# Patient Record
Sex: FEMALE | Race: WHITE | NOT HISPANIC OR LATINO | Employment: FULL TIME | URBAN - METROPOLITAN AREA
[De-identification: names, ages, dates, MRNs, and addresses within clinical notes are randomized per-mention and may not be internally consistent; named-entity substitution may affect disease eponyms.]

---

## 2021-10-14 ENCOUNTER — TELEPHONE (OUTPATIENT)
Dept: NEUROSURGERY | Facility: CLINIC | Age: 41
End: 2021-10-14

## 2021-10-19 ENCOUNTER — TELEPHONE (OUTPATIENT)
Dept: NEUROSURGERY | Facility: CLINIC | Age: 41
End: 2021-10-19

## 2021-10-21 ENCOUNTER — TELEPHONE (OUTPATIENT)
Dept: NEUROSURGERY | Facility: CLINIC | Age: 41
End: 2021-10-21

## 2021-10-26 ENCOUNTER — LAB VISIT (OUTPATIENT)
Dept: LAB | Facility: HOSPITAL | Age: 41
End: 2021-10-26
Attending: NEUROLOGICAL SURGERY
Payer: COMMERCIAL

## 2021-10-26 ENCOUNTER — OFFICE VISIT (OUTPATIENT)
Dept: ENDOCRINOLOGY | Facility: CLINIC | Age: 41
End: 2021-10-26
Payer: COMMERCIAL

## 2021-10-26 ENCOUNTER — OFFICE VISIT (OUTPATIENT)
Dept: NEUROSURGERY | Facility: CLINIC | Age: 41
End: 2021-10-26
Payer: COMMERCIAL

## 2021-10-26 VITALS
SYSTOLIC BLOOD PRESSURE: 125 MMHG | HEART RATE: 77 BPM | HEIGHT: 63 IN | DIASTOLIC BLOOD PRESSURE: 85 MMHG | WEIGHT: 129.63 LBS | BODY MASS INDEX: 22.97 KG/M2 | OXYGEN SATURATION: 100 %

## 2021-10-26 DIAGNOSIS — R42 DIZZINESS: ICD-10-CM

## 2021-10-26 DIAGNOSIS — E23.6 PITUITARY CYST: ICD-10-CM

## 2021-10-26 DIAGNOSIS — G89.29 CHRONIC INTRACTABLE HEADACHE, UNSPECIFIED HEADACHE TYPE: ICD-10-CM

## 2021-10-26 DIAGNOSIS — H53.9 VISUAL CHANGES: ICD-10-CM

## 2021-10-26 DIAGNOSIS — H53.8 BLURRY VISION: ICD-10-CM

## 2021-10-26 DIAGNOSIS — E23.6 PITUITARY CYST: Primary | ICD-10-CM

## 2021-10-26 DIAGNOSIS — R51.9 NONINTRACTABLE EPISODIC HEADACHE, UNSPECIFIED HEADACHE TYPE: ICD-10-CM

## 2021-10-26 DIAGNOSIS — R51.9 CHRONIC INTRACTABLE HEADACHE, UNSPECIFIED HEADACHE TYPE: ICD-10-CM

## 2021-10-26 LAB
ANION GAP SERPL CALC-SCNC: 7 MMOL/L (ref 8–16)
BASOPHILS # BLD AUTO: 0.03 K/UL (ref 0–0.2)
BASOPHILS NFR BLD: 0.4 % (ref 0–1.9)
BUN SERPL-MCNC: 4 MG/DL (ref 6–20)
CALCIUM SERPL-MCNC: 9.4 MG/DL (ref 8.7–10.5)
CHLORIDE SERPL-SCNC: 108 MMOL/L (ref 95–110)
CO2 SERPL-SCNC: 23 MMOL/L (ref 23–29)
CREAT SERPL-MCNC: 0.7 MG/DL (ref 0.5–1.4)
DIFFERENTIAL METHOD: ABNORMAL
EOSINOPHIL # BLD AUTO: 0 K/UL (ref 0–0.5)
EOSINOPHIL NFR BLD: 0.6 % (ref 0–8)
ERYTHROCYTE [DISTWIDTH] IN BLOOD BY AUTOMATED COUNT: 14 % (ref 11.5–14.5)
EST. GFR  (AFRICAN AMERICAN): >60 ML/MIN/1.73 M^2
EST. GFR  (NON AFRICAN AMERICAN): >60 ML/MIN/1.73 M^2
GLUCOSE SERPL-MCNC: 87 MG/DL (ref 70–110)
HCT VFR BLD AUTO: 36.4 % (ref 37–48.5)
HGB BLD-MCNC: 12.2 G/DL (ref 12–16)
IMM GRANULOCYTES # BLD AUTO: 0.02 K/UL (ref 0–0.04)
IMM GRANULOCYTES NFR BLD AUTO: 0.3 % (ref 0–0.5)
LYMPHOCYTES # BLD AUTO: 1.9 K/UL (ref 1–4.8)
LYMPHOCYTES NFR BLD: 27.4 % (ref 18–48)
MCH RBC QN AUTO: 30.8 PG (ref 27–31)
MCHC RBC AUTO-ENTMCNC: 33.5 G/DL (ref 32–36)
MCV RBC AUTO: 92 FL (ref 82–98)
MONOCYTES # BLD AUTO: 0.4 K/UL (ref 0.3–1)
MONOCYTES NFR BLD: 6.3 % (ref 4–15)
NEUTROPHILS # BLD AUTO: 4.4 K/UL (ref 1.8–7.7)
NEUTROPHILS NFR BLD: 65 % (ref 38–73)
NRBC BLD-RTO: 0 /100 WBC
PLATELET # BLD AUTO: 170 K/UL (ref 150–450)
PMV BLD AUTO: 12.8 FL (ref 9.2–12.9)
POTASSIUM SERPL-SCNC: 4.1 MMOL/L (ref 3.5–5.1)
RBC # BLD AUTO: 3.96 M/UL (ref 4–5.4)
SODIUM SERPL-SCNC: 138 MMOL/L (ref 136–145)
WBC # BLD AUTO: 6.8 K/UL (ref 3.9–12.7)

## 2021-10-26 PROCEDURE — 99999 PR PBB SHADOW E&M-EST. PATIENT-LVL V: ICD-10-PCS | Mod: PBBFAC,,, | Performed by: NEUROLOGICAL SURGERY

## 2021-10-26 PROCEDURE — 3074F PR MOST RECENT SYSTOLIC BLOOD PRESSURE < 130 MM HG: ICD-10-PCS | Mod: CPTII,S$GLB,, | Performed by: NEUROLOGICAL SURGERY

## 2021-10-26 PROCEDURE — 99204 PR OFFICE/OUTPT VISIT, NEW, LEVL IV, 45-59 MIN: ICD-10-PCS | Mod: S$GLB,,, | Performed by: INTERNAL MEDICINE

## 2021-10-26 PROCEDURE — 99999 PR PBB SHADOW E&M-EST. PATIENT-LVL II: CPT | Mod: PBBFAC,,, | Performed by: INTERNAL MEDICINE

## 2021-10-26 PROCEDURE — 1160F PR REVIEW ALL MEDS BY PRESCRIBER/CLIN PHARMACIST DOCUMENTED: ICD-10-PCS | Mod: CPTII,S$GLB,, | Performed by: NEUROLOGICAL SURGERY

## 2021-10-26 PROCEDURE — 80048 BASIC METABOLIC PNL TOTAL CA: CPT | Performed by: NEUROLOGICAL SURGERY

## 2021-10-26 PROCEDURE — 85025 COMPLETE CBC W/AUTO DIFF WBC: CPT | Performed by: NEUROLOGICAL SURGERY

## 2021-10-26 PROCEDURE — 3074F SYST BP LT 130 MM HG: CPT | Mod: CPTII,S$GLB,, | Performed by: NEUROLOGICAL SURGERY

## 2021-10-26 PROCEDURE — 99999 PR PBB SHADOW E&M-EST. PATIENT-LVL II: ICD-10-PCS | Mod: PBBFAC,,, | Performed by: INTERNAL MEDICINE

## 2021-10-26 PROCEDURE — 1160F RVW MEDS BY RX/DR IN RCRD: CPT | Mod: CPTII,S$GLB,, | Performed by: NEUROLOGICAL SURGERY

## 2021-10-26 PROCEDURE — 3079F PR MOST RECENT DIASTOLIC BLOOD PRESSURE 80-89 MM HG: ICD-10-PCS | Mod: CPTII,S$GLB,, | Performed by: NEUROLOGICAL SURGERY

## 2021-10-26 PROCEDURE — 1159F PR MEDICATION LIST DOCUMENTED IN MEDICAL RECORD: ICD-10-PCS | Mod: CPTII,S$GLB,, | Performed by: NEUROLOGICAL SURGERY

## 2021-10-26 PROCEDURE — 99999 PR PBB SHADOW E&M-EST. PATIENT-LVL V: CPT | Mod: PBBFAC,,, | Performed by: NEUROLOGICAL SURGERY

## 2021-10-26 PROCEDURE — 99205 OFFICE O/P NEW HI 60 MIN: CPT | Mod: S$GLB,,, | Performed by: NEUROLOGICAL SURGERY

## 2021-10-26 PROCEDURE — 1159F MED LIST DOCD IN RCRD: CPT | Mod: CPTII,S$GLB,, | Performed by: NEUROLOGICAL SURGERY

## 2021-10-26 PROCEDURE — 3008F PR BODY MASS INDEX (BMI) DOCUMENTED: ICD-10-PCS | Mod: CPTII,S$GLB,, | Performed by: NEUROLOGICAL SURGERY

## 2021-10-26 PROCEDURE — 3008F BODY MASS INDEX DOCD: CPT | Mod: CPTII,S$GLB,, | Performed by: NEUROLOGICAL SURGERY

## 2021-10-26 PROCEDURE — 99204 OFFICE O/P NEW MOD 45 MIN: CPT | Mod: S$GLB,,, | Performed by: INTERNAL MEDICINE

## 2021-10-26 PROCEDURE — 99205 PR OFFICE/OUTPT VISIT, NEW, LEVL V, 60-74 MIN: ICD-10-PCS | Mod: S$GLB,,, | Performed by: NEUROLOGICAL SURGERY

## 2021-10-26 PROCEDURE — 36415 COLL VENOUS BLD VENIPUNCTURE: CPT | Performed by: NEUROLOGICAL SURGERY

## 2021-10-26 PROCEDURE — 3079F DIAST BP 80-89 MM HG: CPT | Mod: CPTII,S$GLB,, | Performed by: NEUROLOGICAL SURGERY

## 2021-10-26 RX ORDER — DEXAMETHASONE 1 MG/1
1 TABLET ORAL ONCE
Qty: 1 TABLET | Refills: 0 | Status: SHIPPED | OUTPATIENT
Start: 2021-10-26 | End: 2021-10-26

## 2021-10-26 RX ORDER — OMEGA-3 FATTY ACIDS/FISH OIL 300-500 MG
1 CAPSULE ORAL DAILY
COMMUNITY
End: 2023-05-19

## 2021-10-26 RX ORDER — ASCORBIC ACID 500 MG
500 TABLET ORAL DAILY
COMMUNITY

## 2021-10-26 RX ORDER — BUTALBITAL, ACETAMINOPHEN AND CAFFEINE 50; 325; 40 MG/1; MG/1; MG/1
1 TABLET ORAL EVERY 6 HOURS PRN
COMMUNITY
Start: 2021-09-17 | End: 2021-12-08

## 2021-10-26 RX ORDER — CHOLECALCIFEROL (VITAMIN D3) 25 MCG
1000 TABLET ORAL DAILY
COMMUNITY

## 2021-10-26 RX ORDER — ALBUTEROL SULFATE 90 UG/1
2 AEROSOL, METERED RESPIRATORY (INHALATION) EVERY 6 HOURS PRN
COMMUNITY
Start: 2021-07-19

## 2021-10-26 RX ORDER — ASCORBIC ACID 125 MG
1 TABLET,CHEWABLE ORAL NIGHTLY
COMMUNITY

## 2021-10-26 RX ORDER — CYCLOBENZAPRINE HCL 10 MG
5-10 TABLET ORAL 3 TIMES DAILY PRN
COMMUNITY
Start: 2021-07-14 | End: 2022-04-19

## 2021-10-26 RX ORDER — HYDROXYZINE PAMOATE 50 MG/1
50 CAPSULE ORAL 3 TIMES DAILY PRN
COMMUNITY
Start: 2021-07-14 | End: 2022-04-19

## 2021-10-26 RX ORDER — DIPHENHYDRAMINE HCL 25 MG
1 CAPSULE ORAL NIGHTLY
COMMUNITY

## 2021-10-26 RX ORDER — DIAPER,BRIEF,ADULT, DISPOSABLE
1000 EACH MISCELLANEOUS DAILY
COMMUNITY

## 2021-10-29 ENCOUNTER — TELEPHONE (OUTPATIENT)
Dept: NEUROLOGY | Facility: CLINIC | Age: 41
End: 2021-10-29
Payer: COMMERCIAL

## 2021-12-08 ENCOUNTER — OFFICE VISIT (OUTPATIENT)
Dept: NEUROLOGY | Facility: CLINIC | Age: 41
End: 2021-12-08
Payer: COMMERCIAL

## 2021-12-08 VITALS
BODY MASS INDEX: 23.28 KG/M2 | DIASTOLIC BLOOD PRESSURE: 74 MMHG | SYSTOLIC BLOOD PRESSURE: 110 MMHG | WEIGHT: 131.38 LBS | HEART RATE: 72 BPM | HEIGHT: 63 IN

## 2021-12-08 DIAGNOSIS — G89.29 CHRONIC INTRACTABLE HEADACHE, UNSPECIFIED HEADACHE TYPE: Primary | ICD-10-CM

## 2021-12-08 DIAGNOSIS — R51.9 CHRONIC INTRACTABLE HEADACHE, UNSPECIFIED HEADACHE TYPE: Primary | ICD-10-CM

## 2021-12-08 PROCEDURE — 99999 PR PBB SHADOW E&M-EST. PATIENT-LVL III: CPT | Mod: PBBFAC,,, | Performed by: PSYCHIATRY & NEUROLOGY

## 2021-12-08 PROCEDURE — 99999 PR PBB SHADOW E&M-EST. PATIENT-LVL III: ICD-10-PCS | Mod: PBBFAC,,, | Performed by: PSYCHIATRY & NEUROLOGY

## 2021-12-08 PROCEDURE — 99205 PR OFFICE/OUTPT VISIT, NEW, LEVL V, 60-74 MIN: ICD-10-PCS | Mod: S$GLB,,, | Performed by: PSYCHIATRY & NEUROLOGY

## 2021-12-08 PROCEDURE — 99205 OFFICE O/P NEW HI 60 MIN: CPT | Mod: S$GLB,,, | Performed by: PSYCHIATRY & NEUROLOGY

## 2021-12-08 RX ORDER — GALCANEZUMAB 120 MG/ML
120 INJECTION, SOLUTION SUBCUTANEOUS
Qty: 3 EACH | Refills: 3 | Status: SHIPPED | OUTPATIENT
Start: 2021-12-08 | End: 2021-12-30 | Stop reason: SDUPTHER

## 2021-12-09 ENCOUNTER — PATIENT MESSAGE (OUTPATIENT)
Dept: NEUROLOGY | Facility: CLINIC | Age: 41
End: 2021-12-09
Payer: COMMERCIAL

## 2021-12-09 ENCOUNTER — TELEPHONE (OUTPATIENT)
Dept: NEUROLOGY | Facility: CLINIC | Age: 41
End: 2021-12-09
Payer: COMMERCIAL

## 2021-12-09 NOTE — TELEPHONE ENCOUNTER
----- Message from Shy Cheung sent at 12/9/2021  2:30 PM CST -----  Regarding: pt called  Name of Who is Calling: VIET CALDERA [[id number]]      What is the request in detail: pt is waiting for a pre auth for her new medication and is going though a tension headache and would like to speak with a  nurse. Please advise      Can the clinic reply by [facility]: No      [facility] [phone]

## 2021-12-15 ENCOUNTER — OFFICE VISIT (OUTPATIENT)
Dept: NEUROLOGY | Facility: CLINIC | Age: 41
End: 2021-12-15
Payer: COMMERCIAL

## 2021-12-15 VITALS
DIASTOLIC BLOOD PRESSURE: 73 MMHG | SYSTOLIC BLOOD PRESSURE: 125 MMHG | WEIGHT: 131 LBS | BODY MASS INDEX: 23.21 KG/M2 | HEIGHT: 63 IN | HEART RATE: 78 BPM

## 2021-12-15 DIAGNOSIS — G44.40 MEDICATION OVERUSE HEADACHE: ICD-10-CM

## 2021-12-15 DIAGNOSIS — G43.711 INTRACTABLE CHRONIC MIGRAINE WITHOUT AURA AND WITH STATUS MIGRAINOSUS: Primary | ICD-10-CM

## 2021-12-15 DIAGNOSIS — G89.29 CHRONIC INTRACTABLE HEADACHE, UNSPECIFIED HEADACHE TYPE: ICD-10-CM

## 2021-12-15 DIAGNOSIS — E23.6 PITUITARY CYST: ICD-10-CM

## 2021-12-15 DIAGNOSIS — R51.9 CHRONIC INTRACTABLE HEADACHE, UNSPECIFIED HEADACHE TYPE: ICD-10-CM

## 2021-12-15 PROCEDURE — 99999 PR PBB SHADOW E&M-EST. PATIENT-LVL IV: CPT | Mod: PBBFAC,,, | Performed by: PHYSICIAN ASSISTANT

## 2021-12-15 PROCEDURE — 99215 PR OFFICE/OUTPT VISIT, EST, LEVL V, 40-54 MIN: ICD-10-PCS | Mod: S$GLB,,, | Performed by: PHYSICIAN ASSISTANT

## 2021-12-15 PROCEDURE — 99215 OFFICE O/P EST HI 40 MIN: CPT | Mod: S$GLB,,, | Performed by: PHYSICIAN ASSISTANT

## 2021-12-15 PROCEDURE — 99999 PR PBB SHADOW E&M-EST. PATIENT-LVL IV: ICD-10-PCS | Mod: PBBFAC,,, | Performed by: PHYSICIAN ASSISTANT

## 2021-12-30 DIAGNOSIS — G89.29 CHRONIC INTRACTABLE HEADACHE, UNSPECIFIED HEADACHE TYPE: ICD-10-CM

## 2021-12-30 DIAGNOSIS — R51.9 CHRONIC INTRACTABLE HEADACHE, UNSPECIFIED HEADACHE TYPE: ICD-10-CM

## 2021-12-30 RX ORDER — GALCANEZUMAB 120 MG/ML
120 INJECTION, SOLUTION SUBCUTANEOUS
Qty: 3 EACH | Refills: 3 | Status: SHIPPED | OUTPATIENT
Start: 2021-12-30 | End: 2022-11-28

## 2022-01-07 ENCOUNTER — HOSPITAL ENCOUNTER (OUTPATIENT)
Dept: RADIOLOGY | Facility: HOSPITAL | Age: 42
Discharge: HOME | End: 2022-01-07
Attending: NEUROLOGICAL SURGERY
Payer: COMMERCIAL

## 2022-01-07 DIAGNOSIS — E23.6 PITUITARY CYST: ICD-10-CM

## 2022-01-07 PROCEDURE — A9585 GADOBUTROL INJECTION: HCPCS | Performed by: NEUROLOGICAL SURGERY

## 2022-01-07 PROCEDURE — 25500020 PHARM REV CODE 255: Performed by: NEUROLOGICAL SURGERY

## 2022-01-07 PROCEDURE — 70553 MRI BRAIN STEM W/O & W/DYE: CPT | Mod: TC

## 2022-01-07 PROCEDURE — 70553 MRI PITUITARY W W/O CONTRAST: ICD-10-PCS | Mod: 26,,, | Performed by: RADIOLOGY

## 2022-01-07 PROCEDURE — 70553 MRI BRAIN STEM W/O & W/DYE: CPT | Mod: 26,,, | Performed by: RADIOLOGY

## 2022-01-07 RX ORDER — GADOBUTROL 604.72 MG/ML
3 INJECTION INTRAVENOUS
Status: COMPLETED | OUTPATIENT
Start: 2022-01-07 | End: 2022-01-07

## 2022-01-07 RX ADMIN — GADOBUTROL 3 ML: 604.72 INJECTION INTRAVENOUS at 08:01

## 2022-01-12 ENCOUNTER — PATIENT MESSAGE (OUTPATIENT)
Dept: NEUROSURGERY | Facility: HOSPITAL | Age: 42
End: 2022-01-12
Payer: COMMERCIAL

## 2022-01-20 ENCOUNTER — OFFICE VISIT (OUTPATIENT)
Dept: PHYSICAL MEDICINE AND REHAB | Facility: CLINIC | Age: 42
End: 2022-01-20
Payer: COMMERCIAL

## 2022-01-20 VITALS
WEIGHT: 123 LBS | DIASTOLIC BLOOD PRESSURE: 91 MMHG | SYSTOLIC BLOOD PRESSURE: 128 MMHG | HEIGHT: 63 IN | BODY MASS INDEX: 21.79 KG/M2 | HEART RATE: 84 BPM

## 2022-01-20 DIAGNOSIS — G43.011 INTRACTABLE MIGRAINE WITHOUT AURA AND WITH STATUS MIGRAINOSUS: Primary | ICD-10-CM

## 2022-01-20 PROCEDURE — 3074F PR MOST RECENT SYSTOLIC BLOOD PRESSURE < 130 MM HG: ICD-10-PCS | Mod: CPTII,S$GLB,, | Performed by: PHYSICIAN ASSISTANT

## 2022-01-20 PROCEDURE — 1159F PR MEDICATION LIST DOCUMENTED IN MEDICAL RECORD: ICD-10-PCS | Mod: CPTII,S$GLB,, | Performed by: PHYSICIAN ASSISTANT

## 2022-01-20 PROCEDURE — 3008F BODY MASS INDEX DOCD: CPT | Mod: CPTII,S$GLB,, | Performed by: PHYSICIAN ASSISTANT

## 2022-01-20 PROCEDURE — 99999 PR PBB SHADOW E&M-EST. PATIENT-LVL III: CPT | Mod: PBBFAC,,, | Performed by: PHYSICIAN ASSISTANT

## 2022-01-20 PROCEDURE — 3074F SYST BP LT 130 MM HG: CPT | Mod: CPTII,S$GLB,, | Performed by: PHYSICIAN ASSISTANT

## 2022-01-20 PROCEDURE — 3080F PR MOST RECENT DIASTOLIC BLOOD PRESSURE >= 90 MM HG: ICD-10-PCS | Mod: CPTII,S$GLB,, | Performed by: PHYSICIAN ASSISTANT

## 2022-01-20 PROCEDURE — 99999 PR PBB SHADOW E&M-EST. PATIENT-LVL III: ICD-10-PCS | Mod: PBBFAC,,, | Performed by: PHYSICIAN ASSISTANT

## 2022-01-20 PROCEDURE — 64615 PR CHEMODENERVATION OF MUSCLE FOR CHRONIC MIGRAINE: ICD-10-PCS | Mod: S$GLB,,, | Performed by: PHYSICIAN ASSISTANT

## 2022-01-20 PROCEDURE — 3008F PR BODY MASS INDEX (BMI) DOCUMENTED: ICD-10-PCS | Mod: CPTII,S$GLB,, | Performed by: PHYSICIAN ASSISTANT

## 2022-01-20 PROCEDURE — 99499 UNLISTED E&M SERVICE: CPT | Mod: S$GLB,,, | Performed by: PHYSICIAN ASSISTANT

## 2022-01-20 PROCEDURE — 1160F PR REVIEW ALL MEDS BY PRESCRIBER/CLIN PHARMACIST DOCUMENTED: ICD-10-PCS | Mod: CPTII,S$GLB,, | Performed by: PHYSICIAN ASSISTANT

## 2022-01-20 PROCEDURE — 99499 NO LOS: ICD-10-PCS | Mod: S$GLB,,, | Performed by: PHYSICIAN ASSISTANT

## 2022-01-20 PROCEDURE — 1159F MED LIST DOCD IN RCRD: CPT | Mod: CPTII,S$GLB,, | Performed by: PHYSICIAN ASSISTANT

## 2022-01-20 PROCEDURE — 3080F DIAST BP >= 90 MM HG: CPT | Mod: CPTII,S$GLB,, | Performed by: PHYSICIAN ASSISTANT

## 2022-01-20 PROCEDURE — 64615 CHEMODENERV MUSC MIGRAINE: CPT | Mod: S$GLB,,, | Performed by: PHYSICIAN ASSISTANT

## 2022-01-20 PROCEDURE — 1160F RVW MEDS BY RX/DR IN RCRD: CPT | Mod: CPTII,S$GLB,, | Performed by: PHYSICIAN ASSISTANT

## 2022-01-20 NOTE — PROCEDURES
PROCEDURE NOTE:  BOTOX was performed as an indicated therapy for intractable chronic migraine headaches given that the patient failed more than 2 headache medications     A time out was conducted just before the start of the procedure to verify the correct patient and procedure, procedure location, and all relevant critical information.      Botulinum Toxin Injection Procedure      PROCEDURE PERFORMED: Botulinum toxin injection (45265)  CLINICAL INDICATION: Chronic Migraines  After risks and benefits were explained including bleeding, infection, worsening of pain, damage to the areas being injected, weakness of muscles, loss of muscle control, dysphagia if injecting the head or neck, facial droop if injecting the facial area, painful injection, allergic or other reaction to the medications being injected, and the failure of the procedure to help the problem, a signed consent was obtained.   The patient was placed in a comfortable area and the sites to be treated were identified.The area to be treated was prepped three times with alcohol and the alcohol allowed to dry. Next, a 30 gauge needle was used to inject the medication in the area to be treated.      Total Botox used: 155 Units   Unavoidable waste: 45 Units      Injection sites:    muscle bilaterally ( a total of 10 units divided into 2 sites)   Procerus muscle (5 units)   Frontalis muscle bilaterally (a total of 20 units divided into 4 sites)   Temporalis muscle bilaterally (a total of 40 units divided into 8 sites)   Occipitalis muscle bilaterally (a total of 30 units divided into 6 sites)   Cervical paraspinal muscles (a total of 20 units divided into 4 sites)   Trapezius muscle bilaterally (a total of 30 units divided into 6 sites)   Complications: none   RTC for the next Botox injection: 12 weeks     Problem List Items Addressed This Visit        Neuro    Intractable migraine without aura and with status migrainosus - Primary    Relevant  Medications    onabotulinumtoxina injection 200 Units (Completed)            Montse House PA-C  [facility]   [phone]

## 2022-01-25 ENCOUNTER — OFFICE VISIT (OUTPATIENT)
Dept: NEUROSURGERY | Facility: CLINIC | Age: 42
End: 2022-01-25
Payer: COMMERCIAL

## 2022-01-25 VITALS
SYSTOLIC BLOOD PRESSURE: 117 MMHG | HEIGHT: 63 IN | HEART RATE: 84 BPM | DIASTOLIC BLOOD PRESSURE: 80 MMHG | WEIGHT: 129 LBS | BODY MASS INDEX: 22.86 KG/M2

## 2022-01-25 DIAGNOSIS — E23.6 PITUITARY CYST: Primary | ICD-10-CM

## 2022-01-25 PROCEDURE — 3079F PR MOST RECENT DIASTOLIC BLOOD PRESSURE 80-89 MM HG: ICD-10-PCS | Mod: CPTII,S$GLB,, | Performed by: NEUROLOGICAL SURGERY

## 2022-01-25 PROCEDURE — 99215 OFFICE O/P EST HI 40 MIN: CPT | Mod: S$GLB,,, | Performed by: NEUROLOGICAL SURGERY

## 2022-01-25 PROCEDURE — 3008F PR BODY MASS INDEX (BMI) DOCUMENTED: ICD-10-PCS | Mod: CPTII,S$GLB,, | Performed by: NEUROLOGICAL SURGERY

## 2022-01-25 PROCEDURE — 3074F PR MOST RECENT SYSTOLIC BLOOD PRESSURE < 130 MM HG: ICD-10-PCS | Mod: CPTII,S$GLB,, | Performed by: NEUROLOGICAL SURGERY

## 2022-01-25 PROCEDURE — 1160F PR REVIEW ALL MEDS BY PRESCRIBER/CLIN PHARMACIST DOCUMENTED: ICD-10-PCS | Mod: CPTII,S$GLB,, | Performed by: NEUROLOGICAL SURGERY

## 2022-01-25 PROCEDURE — 99215 PR OFFICE/OUTPT VISIT, EST, LEVL V, 40-54 MIN: ICD-10-PCS | Mod: S$GLB,,, | Performed by: NEUROLOGICAL SURGERY

## 2022-01-25 PROCEDURE — 99999 PR PBB SHADOW E&M-EST. PATIENT-LVL IV: CPT | Mod: PBBFAC,,, | Performed by: NEUROLOGICAL SURGERY

## 2022-01-25 PROCEDURE — 1159F MED LIST DOCD IN RCRD: CPT | Mod: CPTII,S$GLB,, | Performed by: NEUROLOGICAL SURGERY

## 2022-01-25 PROCEDURE — 3074F SYST BP LT 130 MM HG: CPT | Mod: CPTII,S$GLB,, | Performed by: NEUROLOGICAL SURGERY

## 2022-01-25 PROCEDURE — 1160F RVW MEDS BY RX/DR IN RCRD: CPT | Mod: CPTII,S$GLB,, | Performed by: NEUROLOGICAL SURGERY

## 2022-01-25 PROCEDURE — 3008F BODY MASS INDEX DOCD: CPT | Mod: CPTII,S$GLB,, | Performed by: NEUROLOGICAL SURGERY

## 2022-01-25 PROCEDURE — 1159F PR MEDICATION LIST DOCUMENTED IN MEDICAL RECORD: ICD-10-PCS | Mod: CPTII,S$GLB,, | Performed by: NEUROLOGICAL SURGERY

## 2022-01-25 PROCEDURE — 3079F DIAST BP 80-89 MM HG: CPT | Mod: CPTII,S$GLB,, | Performed by: NEUROLOGICAL SURGERY

## 2022-01-25 PROCEDURE — 99999 PR PBB SHADOW E&M-EST. PATIENT-LVL IV: ICD-10-PCS | Mod: PBBFAC,,, | Performed by: NEUROLOGICAL SURGERY

## 2022-01-25 NOTE — PROGRESS NOTES
"CHIEF COMPLAINT:  Pituitary cyst    INTERVAL HISTORY (1/25/22):  The patient returns for surveillance of pituitary cyst.  Since her last visit patient has been seen by Dr. House of Neurology for her headaches.  She has undergone 1 Botox injection and 3 doses of emgality.  These have helped only marginally.  She continues to have significant headaches that she describes as pressure and tension.  She also notes that she has had some nasal drainage and has found that Sudafed helps with the head pressure.  She was seen by Ophthalmology informed her that her vision was good.  She will get me those reports.  She still feels like the headaches are debilitating.    HPI:  Wanda Singleton is a 41 y.o.  female who is referred to me for evaluation of incidentally discovered pituitary cyst.  Cyst has remained stable over 2 MRIs  by 1 month.  This was discovered after developing a constellation of symptoms including headache, blurry vision, fatigue, dizziness that have progressively worsened.  She is typically very active (rides bike, yoga) but is now unable to do 2/2 sxs.      Started with sinus pressure in Jan 1, 2021 and was treated for allergies but did not help sxs.  This progressed to "beating" sensation in her head that correlated with her heartbeat.  This progressed to headache that involves whole and radiates from back around front bilaterally.  Currently a combination of pain and pressure.  She also c/o throat pain and feels like "someone is choking her."  She also reports dizziness and was eval by ENT and vertigo was r/o  She attended PT for vertigo but did not help.  She reports falling over and having difficulty getting up requiring her partner to pick her up.  Symptoms are worse on her period.  She was seen by neurology who diagnosed her with a headache.  Fioricet is only thing that helps minimally.    She has endometriosis with heavy periods with only 2 weeks off in " between.    Hyperprolactinemia:  []  breast tenderness []  nipple discharge   [x]  Menstrual Irregularity (endometriosis)        []  Denies                  Thyroid:  [x]  fatigue       [x]  weight change (loss)      []  temp intolerance (cold)                 []  Denies   []  BM change           [x]  skin/hair change (losing hair)   [] palpitations  []  tremor        []  Denies       Growth Hormone Excess:  []  increase hand/foot size        []  teeth spacing change                               [x]  Denies  []  worse glycemic control/htn           []  Carpal tunnel                               [x]  Denies       Cushing's syndrome:  []  Easy bruising         []  Weight gain           []  Worse glycemic control  []  HTN                       []  Acne                      []  Hirsutism  []  Striae                     []  Menstrual change [x]  Denies     Gonadotrophs:      [x]  Irregular menses   []  Postmenopausal   []  Decreased libido   []  ED                         []  Denies    DI:   [x]  polyuria                 [x]  Polydipsia              []  Nocturia x 1   []  Denies                Review of patient's allergies indicates:   Allergen Reactions    Adhesive Blisters, Hives, Itching, Rash and Swelling    Bee sting kit Hives, Itching, Nausea Only and Swelling    Fluticasone Anxiety, Other (See Comments), Palpitations and Shortness Of Breath    Guaifenesin Anxiety, Hallucinations, Palpitations and Shortness Of Breath    Horseradish root extract Anaphylaxis, Hives, Nausea And Vomiting, Palpitations, Shortness Of Breath and Swelling    Bleach (sodium hypochlorite)     Horseradish     Oxycodone Hives and Itching    Latex, natural rubber Hives, Itching and Rash    Penicillins Hives and Nausea And Vomiting    Sulfa (sulfonamide antibiotics) Anxiety, Diarrhea, Hives, Itching, Nausea And Vomiting and Palpitations       History reviewed. No pertinent past medical history.  Past Surgical History:   Procedure  Laterality Date    LAPAROSCOPY      x2 for endometriosis    LEG SURGERY      x8     History reviewed. No pertinent family history.        ROS    OBJECTIVE:   Vital Signs:  Pulse: 84 (01/25/22 0843)  BP: 117/80 (01/25/22 0843)    Physical Exam:  Constitutional: Patient sitting comfortably in chair. Appears well developed and well nourished.  Skin: Exposed areas are intact without abnormal markings, rashes or other lesions.  HEENT: Normocephalic. Normal conjunctivae.  Cardiovascular: Normal rate and regular rhythm.  Respiratory: Chest wall rises and falls symmetrically, without signs of respiratory distress.  Abdomen: Soft and non-tender.  Extremities: Warm and without edema. Calves supple, non-tender.  Psych/Behavior: Normal affect.    Neurological:    Mental status: Alert and oriented. Conversational and appropriate.       Cranial Nerves: VFF to confrontation. PERRL. EOMI without nystagmus. Facial STLT normal and symmetric. Strong, symmetric muscles of mastication. Facial strength full and symmetric. Hearing equal bilaterally to finger rub. Palate and uvula rise and fall normally in midline. Shoulder shrug 5/5 strength. Tongue midline.     Motor:    Upper:  Deltoids Triceps Biceps WE WF     R 5/5 5/5 5/5 5/5 5/5 5/5    L 5/5 5/5 5/5 5/5 5/5 5/5      Lower:  HF KE KF DF PF EHL    R 5/5 5/5 5/5 5/5 5/5 5/5    L 5/5 5/5 5/5 5/5 5/5 5/5     Sensory: Intact sensation to light touch in all extremities. Romberg negative.    Reflexes:          DTR: 2+ symmetrically throughout.     Pierce's: Negative.     Babinski's: Negative.     Clonus: Negative.    Cerebellar: Finger-to-nose and rapid alternating movements normal.     Gait stable    Diagnostic Results:  All imaging was independently reviewed by me.    MRI brain, dated 1/7/22:  1. Stable pituitary cyst   2. No optic nerve compression    MRI brain, dated 10/1/21:  1. Enhancing mass with sella (0.6 x 0.8 x 0.8cm)  2. No optic nerve compression    MRI brain, dated  9/13/21:  1. Mass appears stable to somewhat larger than subsequent scan  2. SWI does not appear to show any hemorrhage        ASSESSMENT/PLAN:     Problem List Items Addressed This Visit        Endocrine    Pituitary cyst - Primary    Relevant Orders    MRI Pituitary W W/O Contrast        Small pituitary cystic lesion of unclear etiology found incidentally (likely Rathke's cyst).  Stable on surveillance MRI.  I do not believe this cyst explains her debilitating headaches.  No vision changes per ophtho.    - RTC in 1yr with pituitary MRI  - F/u with Dr House (neurology) for headaches  - Instructed to provide recent ophtho notes    The patient understands and agrees with the plan of care. All questions were answered.    Time spent on this encounter: 40 minutes. This includes face-to-face time and non-face to face time preparing to see the patient (eg, review of tests), obtaining and/or reviewing separately obtained history, documenting clinical information in the electronic or other health record, independently interpreting results and communicating results to the patient/family/caregiver, or care coordinator.          .    ;

## 2022-01-27 DIAGNOSIS — R51.9 CHRONIC INTRACTABLE HEADACHE, UNSPECIFIED HEADACHE TYPE: Primary | ICD-10-CM

## 2022-01-27 DIAGNOSIS — G89.29 CHRONIC INTRACTABLE HEADACHE, UNSPECIFIED HEADACHE TYPE: Primary | ICD-10-CM

## 2022-02-11 ENCOUNTER — TELEPHONE (OUTPATIENT)
Dept: NEUROLOGY | Facility: CLINIC | Age: 42
End: 2022-02-11
Payer: COMMERCIAL

## 2022-02-11 NOTE — TELEPHONE ENCOUNTER
----- Message from Tita Miller sent at 2/11/2022 10:15 AM CST -----  Type:  Sooner Apoointment Request    Caller is requesting a sooner appointment.  Caller declined first available appointment listed below.  Caller will not accept being placed on the waitlist and is requesting a message be sent to doctor.    Name of Caller:pt    When is the first available appointment? Sched for 4/21    Symptoms: f/u post Covid headaches     Would the patient rather a call back or a response via [facility]? Call    Best Call Back Number:[phone] (M)     Additional Information: medicine is not working ..    galcanezumab-gnlm (EMGALITY PEN) 120 mg/mL PnIj 3 each 3 12/30/2021   Sig - Route: Inject 120 mg into the skin every 28 days. - Subcutaneous  Sent to pharmacy as: galcanezumab-gnlm (EMGALITY PEN) 120 mg/mL PnIj  Class: Normal  Order: [id number]  Date/Time Signed: 12/30/2021 12:24      E-Prescribing Status: Receipt confirmed by pharmacy (12/30/2021 12:24 PM CST)       [facility]/pharmacy #[id number] - [place], [place] - [address]   Phone:  [phone]  Fax:  [phone]

## 2022-03-03 ENCOUNTER — TELEPHONE (OUTPATIENT)
Dept: NEUROSURGERY | Facility: CLINIC | Age: 42
End: 2022-03-03
Payer: COMMERCIAL

## 2022-03-09 ENCOUNTER — TELEPHONE (OUTPATIENT)
Dept: NEUROLOGY | Facility: CLINIC | Age: 42
End: 2022-03-09
Payer: COMMERCIAL

## 2022-03-09 NOTE — TELEPHONE ENCOUNTER
Initiated PA in [facility], 3 pens/ 84 days, Key [id number]    Your information has been submitted to [facility]. To check for an updated outcome later, reopen this PA request from your dashboard.    If [facility] has not responded to your request within 24 hours, contact [facility] at [phone]

## 2022-04-19 ENCOUNTER — OFFICE VISIT (OUTPATIENT)
Dept: NEUROLOGY | Facility: CLINIC | Age: 42
End: 2022-04-19
Payer: COMMERCIAL

## 2022-04-19 VITALS
SYSTOLIC BLOOD PRESSURE: 118 MMHG | HEIGHT: 63 IN | BODY MASS INDEX: 22.23 KG/M2 | WEIGHT: 125.44 LBS | HEART RATE: 70 BPM | DIASTOLIC BLOOD PRESSURE: 77 MMHG

## 2022-04-19 DIAGNOSIS — G43.711 INTRACTABLE CHRONIC MIGRAINE WITHOUT AURA AND WITH STATUS MIGRAINOSUS: ICD-10-CM

## 2022-04-19 DIAGNOSIS — R09.89 THROAT FULLNESS: Primary | ICD-10-CM

## 2022-04-19 DIAGNOSIS — N80.9 ENDOMETRIOSIS: ICD-10-CM

## 2022-04-19 PROCEDURE — 99999 PR PBB SHADOW E&M-EST. PATIENT-LVL IV: ICD-10-PCS | Mod: PBBFAC,,, | Performed by: PSYCHIATRY & NEUROLOGY

## 2022-04-19 PROCEDURE — 99215 PR OFFICE/OUTPT VISIT, EST, LEVL V, 40-54 MIN: ICD-10-PCS | Mod: S$GLB,,, | Performed by: PSYCHIATRY & NEUROLOGY

## 2022-04-19 PROCEDURE — 99215 OFFICE O/P EST HI 40 MIN: CPT | Mod: S$GLB,,, | Performed by: PSYCHIATRY & NEUROLOGY

## 2022-04-19 PROCEDURE — 1160F PR REVIEW ALL MEDS BY PRESCRIBER/CLIN PHARMACIST DOCUMENTED: ICD-10-PCS | Mod: CPTII,S$GLB,, | Performed by: PSYCHIATRY & NEUROLOGY

## 2022-04-19 PROCEDURE — 3078F PR MOST RECENT DIASTOLIC BLOOD PRESSURE < 80 MM HG: ICD-10-PCS | Mod: CPTII,S$GLB,, | Performed by: PSYCHIATRY & NEUROLOGY

## 2022-04-19 PROCEDURE — 1160F RVW MEDS BY RX/DR IN RCRD: CPT | Mod: CPTII,S$GLB,, | Performed by: PSYCHIATRY & NEUROLOGY

## 2022-04-19 PROCEDURE — 3074F SYST BP LT 130 MM HG: CPT | Mod: CPTII,S$GLB,, | Performed by: PSYCHIATRY & NEUROLOGY

## 2022-04-19 PROCEDURE — 3008F PR BODY MASS INDEX (BMI) DOCUMENTED: ICD-10-PCS | Mod: CPTII,S$GLB,, | Performed by: PSYCHIATRY & NEUROLOGY

## 2022-04-19 PROCEDURE — 1159F PR MEDICATION LIST DOCUMENTED IN MEDICAL RECORD: ICD-10-PCS | Mod: CPTII,S$GLB,, | Performed by: PSYCHIATRY & NEUROLOGY

## 2022-04-19 PROCEDURE — 3008F BODY MASS INDEX DOCD: CPT | Mod: CPTII,S$GLB,, | Performed by: PSYCHIATRY & NEUROLOGY

## 2022-04-19 PROCEDURE — 1159F MED LIST DOCD IN RCRD: CPT | Mod: CPTII,S$GLB,, | Performed by: PSYCHIATRY & NEUROLOGY

## 2022-04-19 PROCEDURE — 3074F PR MOST RECENT SYSTOLIC BLOOD PRESSURE < 130 MM HG: ICD-10-PCS | Mod: CPTII,S$GLB,, | Performed by: PSYCHIATRY & NEUROLOGY

## 2022-04-19 PROCEDURE — 99999 PR PBB SHADOW E&M-EST. PATIENT-LVL IV: CPT | Mod: PBBFAC,,, | Performed by: PSYCHIATRY & NEUROLOGY

## 2022-04-19 PROCEDURE — 3078F DIAST BP <80 MM HG: CPT | Mod: CPTII,S$GLB,, | Performed by: PSYCHIATRY & NEUROLOGY

## 2022-04-19 RX ORDER — FAMOTIDINE 20 MG/1
20 TABLET, FILM COATED ORAL 2 TIMES DAILY
COMMUNITY
End: 2024-02-23

## 2022-04-19 RX ORDER — CYCLOBENZAPRINE HCL 5 MG
5 TABLET ORAL 3 TIMES DAILY PRN
Qty: 30 TABLET | Refills: 2 | Status: SHIPPED | OUTPATIENT
Start: 2022-04-19 | End: 2022-04-29

## 2022-04-19 RX ORDER — ASCORBIC ACID 1000 MG
TABLET ORAL
COMMUNITY
End: 2024-02-23

## 2022-04-19 RX ORDER — RIMEGEPANT SULFATE 75 MG/75MG
75 TABLET, ORALLY DISINTEGRATING ORAL ONCE AS NEEDED
Qty: 12 TABLET | Refills: 2 | Status: SHIPPED | OUTPATIENT
Start: 2022-04-19 | End: 2022-04-19

## 2022-04-19 NOTE — PROGRESS NOTES
[facility] - NEUROLOGY [address]TH [address] [facility], [place]    Date: 4/19/22  Patient Name: Wanda Singleton   MRN: [id number]   PCP: Jas Gusman  Referring Provider: No ref. provider found    Assessment:   Wanda Singleton is a 41 y.o. female presenting for evaluation of chronic headache.  Patient is overdue for trial of Botox and plans to present in the coming weeks for her next set of injections.  Discussed use of Nurtec during wearing off.  Between Emgality doses.  Referral placed to ENT for eval of both throat and ear complaints outlined below.  Patient also endorses chronic daily spotting and bleeding with a history of endometriosis but has not seen an OBGYN in 2 years.  Plan:     Problem List Items Addressed This Visit    None     Visit Diagnoses     Throat fullness    -  Primary    Relevant Orders    Ambulatory consult to ENT    Endometriosis        Relevant Orders    Ambulatory consult to Obstetrics / Gynecology    Intractable chronic migraine without aura and with status migrainosus        Relevant Medications    rimegepant (NURTEC) 75 mg odt    cyclobenzaprine (FLEXERIL) 5 MG tablet        I spent a total of 60 minutes preparing to see the patient, obtaining and reviewing history and results, performing a medically appropriate exam, counseling and educating the patient/family/caregiver, documenting clinical information, coordinating care, and ordering medications, tests, procedures, and referrals.    Aryan Meza MD  [facility]   Department of Neurology    Patient note was created using MModal Dictation.  Any errors in syntax or even information may not have been identified and edited on initial review prior to signing this note.  Subjective:   HPI:   Ms. Wanda Singleton is a 41 y.o. female presenting in follow-up for numerous complaints.  Patient presents today with her  who contributes to the history.  The patient does report  that her headache control is improved since her last visit such that she does not fatigues quickly and she has not missed any work since her last visit.  She states she is sleeping better at night and feels last forgetful.  She does state that while her appetite is for she often forces herself to eat.  She initially was losing weight but has plateaued.  He she does note that her gown the feels like her neck shot and she notices tabs per week.  She also notes that if her.  Happens to occur this same week the headaches are extreme.  She does continue to receive Botox but does state she is overdue for her most recent set of injections.    Today she describes a sensation of severe pressure over the roof of her mouth radiating into her throat.  She does note that she has had intermittent trouble swallowing and feels as though her throat is closing when she is lying down.  Brushing her teeth makes her gag and she constantly feels like there is sinus pressure and something draining into her throat.  It has been quite sometime since she was evaluated by ENT stating that she was told at her last visit that her symptoms were all hormonal.    She endorses intermittent vertigo made worse with position changes stating that she feels like she is drunk when she is walking.  She states this is accompanied by prominent tinnitus.  She does feel that her CBD supplement improves the symptoms.    Prior Meds: Flonase, Vistaril, Meclizine, Singular, Toprol, Prednisone, Diclofenac, Flexeril, Norco, Depakote, Fioricet, Sudafed, Toradol, Ubrelvy, Pamelor, Baclofen, Tylenol, Excedrin, Aleve, Nurtec, Emgality    PAST MEDICAL HISTORY:  History reviewed. No pertinent past medical history.    PAST SURGICAL HISTORY:  Past Surgical History:   Procedure Laterality Date    LAPAROSCOPY      x2 for endometriosis    LEG SURGERY      x8       CURRENT MEDS:  Current Outpatient Medications   Medication Sig Dispense Refill    albuterol  (PROVENTIL/VENTOLIN HFA) 90 mcg/actuation inhaler Inhale 2 puffs into the lungs every 6 (six) hours as needed.      ascorbic acid, vitamin C, (VITAMIN C) 500 MG tablet Take 500 mg by mouth once daily.      diphenhydrAMINE (BENADRYL) 25 mg capsule Take 1 capsule by mouth every evening.      DM/pseudoephed/acetaminophen (PSEUDOEPHED-DM-ACETAMINOPHEN ORAL) Take by mouth.      ELDERBERRY FRUIT AND FLOWER ORAL Take 1 tablet by mouth once daily.      famotidine (PEPCID) 20 MG tablet Take 20 mg by mouth 2 (two) times daily.      galcanezumab-gnlm (EMGALITY PEN) 120 mg/mL PnIj Inject 120 mg into the skin every 28 days. 3 each 3    ginkgo biloba 40 mg Tab Take by mouth.      lidocaine/me-sal/menthol/camph (CBD-KINGS WITH LIDOCAINE TOP) Apply topically.      lysine (L-LYSINE) 500 mg Tab Take 1,000 mg by mouth once daily.      melatonin 5 mg Chew Take 1 tablet by mouth every evening.      omega-3 fatty acids-fish oil (FISH OIL) 300-500 mg Cap Take 1 capsule by mouth once daily.      prenat.vits,zeenat,min-iron-folic (PRENATAL VITAMIN) Tab Take 1 tablet by mouth once daily.      VITAMIN B COMPLEX ORAL Take 1 tablet by mouth once daily.      vitamin D (VITAMIN D3) 1000 units Tab Take 1,000 Units by mouth once daily.      cyclobenzaprine (FLEXERIL) 5 MG tablet Take 1 tablet (5 mg total) by mouth 3 (three) times daily as needed for Muscle spasms (Neck pain). 30 tablet 2    rimegepant (NURTEC) 75 mg odt Take 1 tablet (75 mg total) by mouth once as needed for Migraine. Place ODT tablet on the tongue; alternatively the ODT tablet may be placed under the tongue 12 tablet 2     No current facility-administered medications for this visit.       ALLERGIES:  Review of patient's allergies indicates:   Allergen Reactions    Adhesive Blisters, Hives, Itching, Rash and Swelling    Bee sting kit Hives, Itching, Nausea Only and Swelling    Fluticasone Anxiety, Other (See Comments), Palpitations and Shortness Of Breath     "Guaifenesin Anxiety, Hallucinations, Palpitations and Shortness Of Breath    Horseradish root extract Anaphylaxis, Hives, Nausea And Vomiting, Palpitations, Shortness Of Breath and Swelling    Bleach (sodium hypochlorite)     Horseradish     Oxycodone Hives and Itching    Latex, natural rubber Hives, Itching and Rash    Penicillins Hives and Nausea And Vomiting    Sulfa (sulfonamide antibiotics) Anxiety, Diarrhea, Hives, Itching, Nausea And Vomiting and Palpitations       FAMILY HISTORY:  No family history on file.    SOCIAL HISTORY:       Review of Systems:  12 system review of systems is negative except for the symptoms mentioned in HPI.      Objective:     Vitals:    04/19/22 0950   BP: 118/77   Pulse: 70   Weight: 56.9 kg (125 lb 7.1 oz)   Height: 5' 3" (1.6 m)     General: NAD, well nourished   Eyes: no tearing, discharge, no erythema   ENT: moist mucous membranes of the oral cavity, nares patent    Neck: Supple, full range of motion  Cardiovascular: Warm and well perfused, pulses equal and symmetrical  Lungs: Normal work of breathing, normal chest wall excursions  Skin: No rash, lesions, or breakdown on exposed skin  Psychiatry: Mood and affect are appropriate   Abdomen: soft, non tender, non distended  Extremeties: No cyanosis, clubbing or edema.    Neurological   MENTAL STATUS: Alert and oriented to person, place, and time. Attention and concentration within normal limits. Speech without dysarthria, able to name and repeat without difficulty. Recent and remote memory within normal limits   CRANIAL NERVES: Visual fields intact. PERRL. EOMI. Facial sensation intact. Face symmetrical. Hearing grossly intact. Full shoulder shrug bilaterally. Tongue protrudes midline   SENSORY: Sensation is intact to light touch throughout.    MOTOR: Normal bulk and tone.  5/5 deltoid, biceps, triceps, interosseous, hand  bilaterally. 5/5 iliopsoas, knee extension/flexion, foot dorsi/plantarflexion bilaterally.  "   REFLEXES: Symmetric and 2+ throughout.  CEREBELLAR/COORDINATION/GAIT: Gait steady. Finger to nose intact. Normal rapid alternating movements.

## 2022-04-28 ENCOUNTER — OFFICE VISIT (OUTPATIENT)
Dept: PHYSICAL MEDICINE AND REHAB | Facility: CLINIC | Age: 42
End: 2022-04-28
Payer: COMMERCIAL

## 2022-04-28 VITALS
BODY MASS INDEX: 21.62 KG/M2 | HEIGHT: 63 IN | WEIGHT: 122 LBS | SYSTOLIC BLOOD PRESSURE: 109 MMHG | HEART RATE: 81 BPM | DIASTOLIC BLOOD PRESSURE: 74 MMHG

## 2022-04-28 DIAGNOSIS — G89.29 CHRONIC INTRACTABLE HEADACHE, UNSPECIFIED HEADACHE TYPE: ICD-10-CM

## 2022-04-28 DIAGNOSIS — R51.9 CHRONIC INTRACTABLE HEADACHE, UNSPECIFIED HEADACHE TYPE: ICD-10-CM

## 2022-04-28 DIAGNOSIS — G43.011 INTRACTABLE MIGRAINE WITHOUT AURA AND WITH STATUS MIGRAINOSUS: Primary | ICD-10-CM

## 2022-04-28 DIAGNOSIS — G43.711 INTRACTABLE CHRONIC MIGRAINE WITHOUT AURA AND WITH STATUS MIGRAINOSUS: Primary | ICD-10-CM

## 2022-04-28 PROCEDURE — 64615 PR CHEMODENERVATION OF MUSCLE FOR CHRONIC MIGRAINE: ICD-10-PCS | Mod: S$GLB,,, | Performed by: PHYSICIAN ASSISTANT

## 2022-04-28 PROCEDURE — 99499 NO LOS: ICD-10-PCS | Mod: S$GLB,,, | Performed by: PHYSICIAN ASSISTANT

## 2022-04-28 PROCEDURE — 99999 PR PBB SHADOW E&M-EST. PATIENT-LVL IV: CPT | Mod: PBBFAC,,, | Performed by: PHYSICIAN ASSISTANT

## 2022-04-28 PROCEDURE — 99499 UNLISTED E&M SERVICE: CPT | Mod: S$GLB,,, | Performed by: PHYSICIAN ASSISTANT

## 2022-04-28 PROCEDURE — 3078F DIAST BP <80 MM HG: CPT | Mod: CPTII,S$GLB,, | Performed by: PHYSICIAN ASSISTANT

## 2022-04-28 PROCEDURE — 3074F SYST BP LT 130 MM HG: CPT | Mod: CPTII,S$GLB,, | Performed by: PHYSICIAN ASSISTANT

## 2022-04-28 PROCEDURE — 64615 CHEMODENERV MUSC MIGRAINE: CPT | Mod: S$GLB,,, | Performed by: PHYSICIAN ASSISTANT

## 2022-04-28 PROCEDURE — 1160F PR REVIEW ALL MEDS BY PRESCRIBER/CLIN PHARMACIST DOCUMENTED: ICD-10-PCS | Mod: CPTII,S$GLB,, | Performed by: PHYSICIAN ASSISTANT

## 2022-04-28 PROCEDURE — 3008F BODY MASS INDEX DOCD: CPT | Mod: CPTII,S$GLB,, | Performed by: PHYSICIAN ASSISTANT

## 2022-04-28 PROCEDURE — 1159F MED LIST DOCD IN RCRD: CPT | Mod: CPTII,S$GLB,, | Performed by: PHYSICIAN ASSISTANT

## 2022-04-28 PROCEDURE — 3008F PR BODY MASS INDEX (BMI) DOCUMENTED: ICD-10-PCS | Mod: CPTII,S$GLB,, | Performed by: PHYSICIAN ASSISTANT

## 2022-04-28 PROCEDURE — 1160F RVW MEDS BY RX/DR IN RCRD: CPT | Mod: CPTII,S$GLB,, | Performed by: PHYSICIAN ASSISTANT

## 2022-04-28 PROCEDURE — 1159F PR MEDICATION LIST DOCUMENTED IN MEDICAL RECORD: ICD-10-PCS | Mod: CPTII,S$GLB,, | Performed by: PHYSICIAN ASSISTANT

## 2022-04-28 PROCEDURE — 3078F PR MOST RECENT DIASTOLIC BLOOD PRESSURE < 80 MM HG: ICD-10-PCS | Mod: CPTII,S$GLB,, | Performed by: PHYSICIAN ASSISTANT

## 2022-04-28 PROCEDURE — 99999 PR PBB SHADOW E&M-EST. PATIENT-LVL IV: ICD-10-PCS | Mod: PBBFAC,,, | Performed by: PHYSICIAN ASSISTANT

## 2022-04-28 PROCEDURE — 3074F PR MOST RECENT SYSTOLIC BLOOD PRESSURE < 130 MM HG: ICD-10-PCS | Mod: CPTII,S$GLB,, | Performed by: PHYSICIAN ASSISTANT

## 2022-04-28 NOTE — PROCEDURES
PROCEDURE NOTE:  BOTOX was performed as an indicated therapy for intractable chronic migraine headaches given that the patient failed more than 2 headache medications     A time out was conducted just before the start of the procedure to verify the correct patient and procedure, procedure location, and all relevant critical information.      Botulinum Toxin Injection Procedure      PROCEDURE PERFORMED: Botulinum toxin injection (85549)  CLINICAL INDICATION: Chronic Migraines  After risks and benefits were explained including bleeding, infection, worsening of pain, damage to the areas being injected, weakness of muscles, loss of muscle control, dysphagia if injecting the head or neck, facial droop if injecting the facial area, painful injection, allergic or other reaction to the medications being injected, and the failure of the procedure to help the problem, a signed consent was obtained.   The patient was placed in a comfortable area and the sites to be treated were identified.The area to be treated was prepped three times with alcohol and the alcohol allowed to dry. Next, a 30 gauge needle was used to inject the medication in the area to be treated.      Total Botox used: 155 Units   Unavoidable waste: 45 Units      Injection sites:    muscle bilaterally ( a total of 10 units divided into 2 sites)   Procerus muscle (5 units)   Frontalis muscle bilaterally (a total of 20 units divided into 4 sites)   Temporalis muscle bilaterally (a total of 40 units divided into 8 sites)   Occipitalis muscle bilaterally (a total of 30 units divided into 6 sites)   Cervical paraspinal muscles (a total of 20 units divided into 4 sites)   Trapezius muscle bilaterally (a total of 30 units divided into 6 sites)   Complications: none   RTC for the next Botox injection: 12 weeks     Problem List Items Addressed This Visit        Neuro    Intractable migraine without aura and with status migrainosus - Primary  After first botox,  HA's became less severe x 2 months but  still constant. Had an initial 5-7 days of tightness after botox, no other SE's noted. Of note, pt is taking sudafed daily, is pending ENT appt to discuss alternative options.   Continues med mgmt for her DASH's per Dr. Meza. Recently started on Nurtec.   RTC in 12 wks w/ me for next botox.       Relevant Medications    onabotulinumtoxina injection 200 Units (Completed) (Start on 4/28/2022  2:30 PM)              Montse House PA-C  [facility]   [phone]

## 2022-05-06 ENCOUNTER — OFFICE VISIT (OUTPATIENT)
Dept: OTOLARYNGOLOGY | Facility: CLINIC | Age: 42
End: 2022-05-06
Payer: COMMERCIAL

## 2022-05-06 VITALS
BODY MASS INDEX: 21.83 KG/M2 | SYSTOLIC BLOOD PRESSURE: 116 MMHG | DIASTOLIC BLOOD PRESSURE: 77 MMHG | WEIGHT: 123.25 LBS | HEART RATE: 101 BPM | TEMPERATURE: 98 F

## 2022-05-06 DIAGNOSIS — J34.89 SINUS PRESSURE: Primary | ICD-10-CM

## 2022-05-06 DIAGNOSIS — R09.89 THROAT FULLNESS: ICD-10-CM

## 2022-05-06 PROCEDURE — 3008F PR BODY MASS INDEX (BMI) DOCUMENTED: ICD-10-PCS | Mod: CPTII,S$GLB,, | Performed by: NURSE PRACTITIONER

## 2022-05-06 PROCEDURE — 31575 PR LARYNGOSCOPY, FLEXIBLE; DIAGNOSTIC: ICD-10-PCS | Mod: S$GLB,,, | Performed by: NURSE PRACTITIONER

## 2022-05-06 PROCEDURE — 31575 DIAGNOSTIC LARYNGOSCOPY: CPT | Mod: S$GLB,,, | Performed by: NURSE PRACTITIONER

## 2022-05-06 PROCEDURE — 3074F SYST BP LT 130 MM HG: CPT | Mod: CPTII,S$GLB,, | Performed by: NURSE PRACTITIONER

## 2022-05-06 PROCEDURE — 1159F MED LIST DOCD IN RCRD: CPT | Mod: CPTII,S$GLB,, | Performed by: NURSE PRACTITIONER

## 2022-05-06 PROCEDURE — 3074F PR MOST RECENT SYSTOLIC BLOOD PRESSURE < 130 MM HG: ICD-10-PCS | Mod: CPTII,S$GLB,, | Performed by: NURSE PRACTITIONER

## 2022-05-06 PROCEDURE — 99999 PR PBB SHADOW E&M-EST. PATIENT-LVL IV: CPT | Mod: PBBFAC,,, | Performed by: NURSE PRACTITIONER

## 2022-05-06 PROCEDURE — 3008F BODY MASS INDEX DOCD: CPT | Mod: CPTII,S$GLB,, | Performed by: NURSE PRACTITIONER

## 2022-05-06 PROCEDURE — 99203 OFFICE O/P NEW LOW 30 MIN: CPT | Mod: 25,S$GLB,, | Performed by: NURSE PRACTITIONER

## 2022-05-06 PROCEDURE — 3078F PR MOST RECENT DIASTOLIC BLOOD PRESSURE < 80 MM HG: ICD-10-PCS | Mod: CPTII,S$GLB,, | Performed by: NURSE PRACTITIONER

## 2022-05-06 PROCEDURE — 99203 PR OFFICE/OUTPT VISIT, NEW, LEVL III, 30-44 MIN: ICD-10-PCS | Mod: 25,S$GLB,, | Performed by: NURSE PRACTITIONER

## 2022-05-06 PROCEDURE — 1159F PR MEDICATION LIST DOCUMENTED IN MEDICAL RECORD: ICD-10-PCS | Mod: CPTII,S$GLB,, | Performed by: NURSE PRACTITIONER

## 2022-05-06 PROCEDURE — 3078F DIAST BP <80 MM HG: CPT | Mod: CPTII,S$GLB,, | Performed by: NURSE PRACTITIONER

## 2022-05-06 PROCEDURE — 99999 PR PBB SHADOW E&M-EST. PATIENT-LVL IV: ICD-10-PCS | Mod: PBBFAC,,, | Performed by: NURSE PRACTITIONER

## 2022-05-06 RX ORDER — RIMEGEPANT SULFATE 75 MG/75MG
TABLET, ORALLY DISINTEGRATING ORAL
COMMUNITY
Start: 2022-04-19 | End: 2023-08-16 | Stop reason: SDUPTHER

## 2022-05-06 NOTE — ASSESSMENT & PLAN NOTE
Symptoms are possible caused by chronic sinusitis vs atypical headaches. CT sinuses ordered. Discussed using nasal saline and steroid nasal spray, however she states she has had reactions to steroid sprays before and did not like using nasal saline. Questions answered. Will contact patient with scan results.

## 2022-05-06 NOTE — PROGRESS NOTES
Subjective:       Patient ID: Wanda Singleton is a 41 y.o. female.    Chief Complaint: consult/ pressure  in foreheak    HPI     Wanda Singleton is a 41 year old female who was referred to the head and neck clinic for sinus pressure. She reports a constant pressure behind her nose, cheeks, and radiating to her temples and jaw. She reports that the pain and pressure makes it difficult to function or do every day tasks. She feels constant post nasal drip. She does not usually have rhinitis. Her voice is normal. She has occasional choking episodes, which seem to be improving. She takes Sudafed daily during the day. She takes Benadryl at night. She has lost 20lb in the past year due to decreased appetite. She has occasional reflux. She takes Pepcid daily which seems to help.    Over a year ago, she had COVID and developed severe dizziness, vertigo and tinnitus.     No past medical history on file.    Past Surgical History:   Procedure Laterality Date    LAPAROSCOPY      x2 for endometriosis    LEG SURGERY      x8         Current Outpatient Medications:     albuterol (PROVENTIL/VENTOLIN HFA) 90 mcg/actuation inhaler, Inhale 2 puffs into the lungs every 6 (six) hours as needed., Disp: , Rfl:     ascorbic acid, vitamin C, (VITAMIN C) 500 MG tablet, Take 500 mg by mouth once daily., Disp: , Rfl:     diphenhydrAMINE (BENADRYL) 25 mg capsule, Take 1 capsule by mouth every evening., Disp: , Rfl:     DM/pseudoephed/acetaminophen (PSEUDOEPHED-DM-ACETAMINOPHEN ORAL), Take by mouth., Disp: , Rfl:     ELDERBERRY FRUIT AND FLOWER ORAL, Take 1 tablet by mouth once daily., Disp: , Rfl:     famotidine (PEPCID) 20 MG tablet, Take 20 mg by mouth 2 (two) times daily., Disp: , Rfl:     galcanezumab-gnlm (EMGALITY PEN) 120 mg/mL PnIj, Inject 120 mg into the skin every 28 days., Disp: 3 each, Rfl: 3    ginkgo biloba 40 mg Tab, Take by mouth., Disp: , Rfl:     lidocaine/me-sal/menthol/camph (CBD-KINGS WITH LIDOCAINE  TOP), Apply topically., Disp: , Rfl:     lysine (L-LYSINE) 500 mg Tab, Take 1,000 mg by mouth once daily., Disp: , Rfl:     melatonin 5 mg Chew, Take 1 tablet by mouth every evening., Disp: , Rfl:     NURTEC 75 mg odt, PLEASE SEE ATTACHED FOR DETAILED DIRECTIONS, Disp: , Rfl:     omega-3 fatty acids-fish oil (FISH OIL) 300-500 mg Cap, Take 1 capsule by mouth once daily., Disp: , Rfl:     prenat.vits,zeenat,min-iron-folic (PRENATAL VITAMIN) Tab, Take 1 tablet by mouth once daily., Disp: , Rfl:     VITAMIN B COMPLEX ORAL, Take 1 tablet by mouth once daily., Disp: , Rfl:     vitamin D (VITAMIN D3) 1000 units Tab, Take 1,000 Units by mouth once daily., Disp: , Rfl:     Review of patient's allergies indicates:   Allergen Reactions    Adhesive Blisters, Hives, Itching, Rash and Swelling    Bee sting kit Hives, Itching, Nausea Only and Swelling    Fluticasone Anxiety, Other (See Comments), Palpitations and Shortness Of Breath    Guaifenesin Anxiety, Hallucinations, Palpitations and Shortness Of Breath    Horseradish root extract Anaphylaxis, Hives, Nausea And Vomiting, Palpitations, Shortness Of Breath and Swelling    Bleach (sodium hypochlorite)     Horseradish     Oxycodone Hives and Itching    Latex, natural rubber Hives, Itching and Rash    Penicillins Hives and Nausea And Vomiting    Sulfa (sulfonamide antibiotics) Anxiety, Diarrhea, Hives, Itching, Nausea And Vomiting and Palpitations       Social History     Socioeconomic History    Marital status:        No family history on file.      Review of Systems   Constitutional: Positive for appetite change. Negative for chills, diaphoresis, fatigue, fever and unexpected weight change.   HENT: Positive for ear pain, postnasal drip, sinus pressure/congestion and trouble swallowing. Negative for nasal congestion, dental problem, drooling, ear discharge, facial swelling, hearing loss, mouth sores, nosebleeds, rhinorrhea, sneezing, sore throat, tinnitus  and voice change.    Eyes: Positive for itching. Negative for pain, discharge and redness.   Respiratory: Positive for shortness of breath. Negative for cough.    Cardiovascular: Negative for chest pain.   Gastrointestinal: Negative.  Negative for abdominal distention, abdominal pain, diarrhea, nausea and vomiting.   Endocrine: Positive for cold intolerance. Negative for heat intolerance.   Genitourinary: Negative for difficulty urinating.   Musculoskeletal: Positive for neck pain. Negative for neck stiffness.   Integumentary:  Negative for rash. Negative.   Allergic/Immunologic: Positive for food allergies.   Neurological: Positive for dizziness, light-headedness and headaches. Negative for weakness.   Hematological: Negative for adenopathy.   Psychiatric/Behavioral: Positive for decreased concentration and sleep disturbance.         Objective:      Physical Exam  Vitals reviewed.   Constitutional:       General: She is not in acute distress.     Appearance: Normal appearance. She is well-developed. She is not ill-appearing or diaphoretic.   HENT:      Head: Normocephalic and atraumatic.      Jaw: No trismus.      Right Ear: Hearing, tympanic membrane, ear canal and external ear normal.      Left Ear: Hearing, tympanic membrane, ear canal and external ear normal.      Nose: Nose normal. No nasal deformity, mucosal edema or rhinorrhea.      Right Sinus: No maxillary sinus tenderness or frontal sinus tenderness.      Left Sinus: No maxillary sinus tenderness or frontal sinus tenderness.      Mouth/Throat:      Lips: Pink. No lesions.      Mouth: Mucous membranes are moist. Mucous membranes are not pale, not dry and not cyanotic. No oral lesions.      Dentition: Normal dentition. Does not have dentures. No dental caries.      Pharynx: Oropharynx is clear. Uvula midline. No oropharyngeal exudate, posterior oropharyngeal erythema or uvula swelling.      Tonsils: No tonsillar abscesses.      Comments: Procedure: Flexible  laryngoscopy  In order to fully examine the upper aerodigestive tract, including the larynx, in a patient with a hyperactive gag reflex, flexible endoscopy is required.  After explaining the procedure and obtaining verbal consent, a timeout was performed with the patient's participation according to the universal protocol. Both nasal cavities were anesthetized with 4% Xylocaine spray mixed with Nick-Synephrine. The flexible laryngoscope (#[id number]) was inserted into the nasal cavity and advanced to visualize the nasal cavity, nasopharynx, the posterior oropharynx, hypopharynx, and the endolarynx with the above findings noted. The scope was removed and the procedure terminated. The patient tolerated this procedure well without apparent complication.      FINDINGS  Nasopharynx - the torus is clear. There are no lesions of the posterior wall.   Oropharynx - no lesions of the tongue base. There is no obvious fullness or asymmetry.  Hypopharynx - there are no lesions of the pyriform sinuses or postcricoid region   Larynx - there are no lesions of the supraglottic or glottic larynx. Vocal fold mobility is normal with complete closure.     Eyes:      General: No scleral icterus.        Right eye: No discharge.         Left eye: No discharge.      Conjunctiva/sclera: Conjunctivae normal.   Neck:      Thyroid: No thyroid mass or thyromegaly.      Vascular: No JVD.      Trachea: Trachea and phonation normal. No tracheal tenderness or tracheal deviation.      Comments: Salivary glands - there are no lesions or asymmetric findings in the submandibular or parotid glands    Cardiovascular:      Rate and Rhythm: Normal rate.   Pulmonary:      Effort: Pulmonary effort is normal. No respiratory distress.      Breath sounds: No stridor.   Musculoskeletal:      Cervical back: Normal range of motion and neck supple.   Lymphadenopathy:      Head:      Right side of head: No submental, submandibular, tonsillar or preauricular adenopathy.       Left side of head: No submental, submandibular, tonsillar or preauricular adenopathy.      Cervical: No cervical adenopathy.   Skin:     General: Skin is warm and dry.      Coloration: Skin is not pale.      Findings: No erythema or rash.   Neurological:      General: No focal deficit present.      Mental Status: She is alert and oriented to person, place, and time.      Cranial Nerves: No cranial nerve deficit.   Psychiatric:         Mood and Affect: Mood normal.         Behavior: Behavior normal. Behavior is cooperative.         Thought Content: Thought content normal.         Assessment:       Problem List Items Addressed This Visit        ENT    Sinus pressure - Primary     Symptoms are possible caused by chronic sinusitis vs atypical headaches. CT sinuses ordered. Discussed using nasal saline and steroid nasal spray, however she states she has had reactions to steroid sprays before and did not like using nasal saline. Questions answered. Will contact patient with scan results.           Relevant Orders    CT [employer] Sinuses without      Other Visit Diagnoses     Throat fullness              Plan:       Problem List Items Addressed This Visit        ENT    Sinus pressure - Primary     Symptoms are possible caused by chronic sinusitis vs atypical headaches. CT sinuses ordered. Discussed using nasal saline and steroid nasal spray, however she states she has had reactions to steroid sprays before and did not like using nasal saline. Questions answered. Will contact patient with scan results.           Relevant Orders    CT [employer] Sinuses without      Other Visit Diagnoses     Throat fullness

## 2022-05-27 ENCOUNTER — HOSPITAL ENCOUNTER (OUTPATIENT)
Dept: RADIOLOGY | Facility: HOSPITAL | Age: 42
Discharge: HOME | End: 2022-05-27
Attending: NURSE PRACTITIONER
Payer: COMMERCIAL

## 2022-05-27 DIAGNOSIS — J34.89 SINUS PRESSURE: ICD-10-CM

## 2022-05-27 PROCEDURE — 70486 CT MAXILLOFACIAL W/O DYE: CPT | Mod: TC,PO

## 2022-06-01 ENCOUNTER — PATIENT MESSAGE (OUTPATIENT)
Dept: OTOLARYNGOLOGY | Facility: CLINIC | Age: 42
End: 2022-06-01
Payer: COMMERCIAL

## 2022-06-21 ENCOUNTER — OFFICE VISIT (OUTPATIENT)
Dept: OTOLARYNGOLOGY | Facility: CLINIC | Age: 42
End: 2022-06-21
Payer: COMMERCIAL

## 2022-06-21 DIAGNOSIS — J32.9 CHRONIC SINUSITIS, UNSPECIFIED LOCATION: Primary | ICD-10-CM

## 2022-06-21 PROCEDURE — 31231 NASAL ENDOSCOPY DX: CPT | Mod: S$GLB,,, | Performed by: STUDENT IN AN ORGANIZED HEALTH CARE EDUCATION/TRAINING PROGRAM

## 2022-06-21 PROCEDURE — 99214 OFFICE O/P EST MOD 30 MIN: CPT | Mod: 25,S$GLB,, | Performed by: STUDENT IN AN ORGANIZED HEALTH CARE EDUCATION/TRAINING PROGRAM

## 2022-06-21 PROCEDURE — 99214 PR OFFICE/OUTPT VISIT, EST, LEVL IV, 30-39 MIN: ICD-10-PCS | Mod: 25,S$GLB,, | Performed by: STUDENT IN AN ORGANIZED HEALTH CARE EDUCATION/TRAINING PROGRAM

## 2022-06-21 PROCEDURE — 31231 PR NASAL ENDOSCOPY, DX: ICD-10-PCS | Mod: S$GLB,,, | Performed by: STUDENT IN AN ORGANIZED HEALTH CARE EDUCATION/TRAINING PROGRAM

## 2022-06-21 PROCEDURE — 99999 PR PBB SHADOW E&M-EST. PATIENT-LVL III: ICD-10-PCS | Mod: PBBFAC,,, | Performed by: STUDENT IN AN ORGANIZED HEALTH CARE EDUCATION/TRAINING PROGRAM

## 2022-06-21 PROCEDURE — 99999 PR PBB SHADOW E&M-EST. PATIENT-LVL III: CPT | Mod: PBBFAC,,, | Performed by: STUDENT IN AN ORGANIZED HEALTH CARE EDUCATION/TRAINING PROGRAM

## 2022-06-27 NOTE — PROGRESS NOTES
Otolaryngology - Head and Neck Surgery New Patient Visit    6/21/2022    Referring Provider: No ref. provider found    Chief Complaint   Patient presents with    chronic sinus issues       History of Present Illness, Otolaryngology Specialty-Specific Exam, and Assessment and Plan:     Wanda Singleton is a 42 y.o. female who presents for evaluation of chronic sinus infections/facial pressure, which has been present since January 2021. She complains of excrutiating facial pressure, decreased sence of smell, discolored nasal drainage, and nasal congestion. She reports having multiple sinus infections over the last year that are usually treated with oral antibiotics. She is unable to recall the last antibiotic that was prescribed to her. She has been seen by multiple MDs for this issue. 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 Gonzalez. She has been seen by neurology for migraines with Botox injections.  She denies epistaxis, nasal bone fractures, visual changes, or rhinitis. She has been treated with flonase in the past, however it resulted in an ED visit due to excruciating facial pain and SOB (she has since had fluticasone added to her allergy list). She has had allergy testing done in the past which was negative. She denies previous skullbase surgery. She denies snoring. The assessment of quality and severity of symptoms as measured by the SNOT-22 score is 93 and the STOP-BANG score is 2.     She had a CT of the sinuses done on 5/27/22 which I reviewed along with the associated radiology report.    On exam today, the ears are normal. The oral cavity is clear. The neck is clear. The nasopharynx, hypopharynx, and larynx are normal. Nasal endoscopy reveals left sided deviation of her nasal septum. Hypertrophic inferior turbinates bilaterally. No nasal masses or nasal polyposis. No purulent drainage in the middle meatus. NP clear.     Impression today is  chronic sinusitis. She is unable to tolerate intranasal steroids spray with recurrent symptoms of sinusitis.     She would benefit from STESS for the treatment of her condition.  I discussed the risks, benefits and alternatives to surgery with the patient, as well as the expected postoperative course.  I gave her the opportunity to ask questions and I answered all of them.  I provided relevant printed information on her condition for her to review at home.  Same-day discharge is anticipated.  She may have anesthesia triage by telephone.   The surgery will be tentatively scheduled for 7/21/22.  She will return for a postoperative visit 1 week after surgery.    The risks and benefits of endoscopic surgery were discussed with the patient in detail, which include but are not limited to pain, bleeding, infection, the need for reoperation, injury to orbit or orbital contents, injury to brain or meninges, and unforeseeable complications of surgery including myocardial infarction, stroke or pulmonary embolus.     Thank you for allowing us to participate in the care of your patient. We will continue to keep you informed of her progress.    Sincerely yours,    Delvis Galloway MD      Objective     Physical Examination  Vitals -  vitals were not taken for this visit.   Constitutional - General appearance: Normal. Ability to communicate: Normal.  Head & Face - Overall appearance, scars, masses: Normal. Palpation &/or percussion of face: Normal. Salivary glands: Normal. Facial strength: Normal  ENMT - Otoscopic exam: Normal. Assessment of hearing: Normal. External inspection: Normal. Nasal mucosa, septum, turbinates: Abnormal see exam details. Lips, teeth, gums: Normal. Oropharynx: Normal. Pharyngeal walls/pyriform sinus: Normal. Larynx: Normal. Nasopharynx: Normal  Neck - Neck: Normal. Thyroid: Normal  Lymphatic - Palpation of lymph nodes: Normal  Eyes - Ocular mobility: Normal  Respiratory - Inspection of Chest:  Normal  Cardiovascular - Peripheral vascular system: Normal  Neurological/Psychiatric - Orientation: Normal    Review of Systems  A complete review of systems was obtained 06/27/2022 and reviewed.  The review of systems is negative for symptoms except as described above.    There were no vitals taken for this visit.     Nasal Endoscopy:  6/21/2022    The use of diagnostic nasal endoscopy was considered medically necessary for the evaluation and visualization of the nasal anatomy for symptoms suggestive of nasal or sinus origin. Physical examination (including a nasal speculum evaluation) did not provide sufficient clinical information to establish a diagnosis, or symptoms did not improve or worsened following treatment.     The nasal cavity was decongested with topical 1% phenylephrine and anesthetized with 4% lidocaine.  A rigid 0-degree endoscope was introduced into the nasal cavity.    The patient was seated in the examination chair. After discussion of risks and benefits, a nasal endoscope was inserted into the nose the endoscope was passed along the left nasal floor to the nasopharynx. It was then passed between the middle and superior meatus, nasal turbinates, nasal septum, nasopharynx and sphenoethmoid region. The nasal endoscope was withdrawn and there was no complications. An identical procedure was performed on the right side. I was present for the entire procedure.The patient tolerated the above procedure well. The findings of this procedure can be found in the dictated note from 6/21/2022 visit.                              Data Reviewed    WBC (K/uL)   Date Value   10/26/2021 6.80     Eosinophil % (%)   Date Value   10/26/2021 0.6     Eos # (K/uL)   Date Value   10/26/2021 0.0     Platelets (K/uL)   Date Value   10/26/2021 170     Glucose (mg/dL)   Date Value   10/26/2021 87     No results found for: IGE    I independently reviewed the images of the CT sinuses dated 5/27/22. Pertinent findings include  left deviated nasal septum. Hypertrophic inferior turbinates bilaterally. Bilateral jeremi cell with narrowing of OMCs bilaterally. Mucosal thickening of bilateral maxillary sinus mucosa. Patchy ethmoid sinus opacification. Mucosal thickening in the left frontal outflow tract.

## 2022-07-14 ENCOUNTER — PATIENT MESSAGE (OUTPATIENT)
Dept: NEUROLOGY | Facility: CLINIC | Age: 42
End: 2022-07-14
Payer: COMMERCIAL

## 2022-07-14 DIAGNOSIS — Z01.818 PRE-OP TESTING: Primary | ICD-10-CM

## 2022-07-14 NOTE — ANESTHESIA PAT ROS NOTE
"                                                                                                             07/14/2022  Wanda Singleton is a 42 y.o., female.      Pre-op Assessment          Review of Systems  Anesthesia Hx:  Denies Hx of Anesthetic complications PATIENT REPORTS SHE WAS TOLD BY ANESTHESIA DURING HER SERIES OF LEG SURGERIES SHE HAS A "HIGH TOLERANCE FOR PAIN MEDICATIONS" History of prior surgery of interest to airway management or planning: Previous anesthesia: General LEG SURGERY 2012 with general anesthesia.  Denies Family Hx of Anesthesia complications.   Denies Personal Hx of Anesthesia complications.   Social:  Smoker, No Alcohol Use    Hematology/Oncology:  Hematology Normal   Oncology Normal     EENT/Dental:   CHRONIC SINUSITIS   Cardiovascular:    Denies Angina.  Functional Capacity good / => 4 METS   Hx of Heart Murmur     Pulmonary:   Asthma Denies Shortness of breath.  Denies Recent URI.    Renal/:  Renal/ Normal     Hepatic/GI:   GERD    Musculoskeletal:   HX OF LEG SURGERY x8 WITH HARDWARE   Neurological:  Dx of Headaches, Migraine Headache   Endocrine:  Endocrine Normal    Psych:  Psychiatric Normal              Anesthesia Assessment: Preoperative EQUATION    Planned Procedure: Procedure(s) (LRB):  FESS, USING COMPUTER-ASSISTED NAVIGATION (Bilateral)  SEPTOPLASTY, NOSE, WITH NASAL TURBINATE REDUCTION (N/A)  Requested Anesthesia Type:General  Surgeon: Delvis Galloway MD  Service: ENT  Known or anticipated Date of Surgery:7/21/2022    Surgeon notes: reviewed    Electronic QUestionnaire Assessment completed via nurse interview with patient.        Triage considerations:     The patient has no apparent active cardiac condition (No unstable coronary Syndrome such as severe unstable angina or recent [<1 month] myocardial infarction, decompensated CHF, severe valvular   disease or significant arrhythmia)    Previous anesthesia records:Not available    Last PCP note: outside " [facility]   Subspecialty notes: ENT, Neurology    Other important co-morbidities: GERD, Smoker and CHRONIC SINUSITIS      Tests already available:  Available tests,  within 3 months , within [facility] .     5/27/22  CT [employer] SINUSES             Instructions given. (See in Nurse's note)    Optimization:  Anesthesia Preop Clinic Assessment NOT Indicated    Medical Opinion NOT Indicated           Plan:    Testing:  PT/INR and PTT-AM OF SURGERY      Patient  has previously scheduled Medical Appointment:NOT AT THIS TIME    Navigation: Tests Scheduled.

## 2022-07-20 ENCOUNTER — ANESTHESIA EVENT (OUTPATIENT)
Dept: SURGERY | Facility: HOSPITAL | Age: 42
End: 2022-07-20
Payer: COMMERCIAL

## 2022-07-20 RX ORDER — SODIUM CHLORIDE 0.9 % (FLUSH) 0.9 %
10 SYRINGE (ML) INJECTION
Status: CANCELLED | OUTPATIENT
Start: 2022-07-20

## 2022-07-20 NOTE — ANESTHESIA PREPROCEDURE EVALUATION
[facility]  Anesthesia Pre-Operative Evaluation         Patient Name: Wanda Singleton  YOB: 1980  MRN: [id number]    SUBJECTIVE:     Pre-operative evaluation for Procedure(s) (LRB):  FESS, USING COMPUTER-ASSISTED NAVIGATION (Bilateral)  SEPTOPLASTY, NOSE, WITH NASAL TURBINATE REDUCTION (N/A)     07/20/2022    Wanda Singleton is a 42 y.o. female w/ migraines, known incidental pituitary cyst, and chronic sinusitis with facial pressure who presents for the above procedure.    LDA: None documented.    Prev airway: None documented.     Drips: None documented.    Patient Active Problem List   Diagnosis    Pituitary cyst    Headache    Visual changes    Intractable migraine without aura and with status migrainosus    Sinus pressure       Review of patient's allergies indicates:   Allergen Reactions    Adhesive Blisters, Hives, Itching, Rash and Swelling    Bee sting kit Hives, Itching, Nausea Only and Swelling    Fluticasone Anxiety, Other (See Comments), Palpitations and Shortness Of Breath    Guaifenesin Anxiety, Hallucinations, Palpitations and Shortness Of Breath    Horseradish root extract Anaphylaxis, Hives, Nausea And Vomiting, Palpitations, Shortness Of Breath and Swelling    Bleach (sodium hypochlorite)     Horseradish     Oxycodone Hives and Itching    Latex, natural rubber Hives, Itching and Rash    Penicillins Hives and Nausea And Vomiting    Sulfa (sulfonamide antibiotics) Anxiety, Diarrhea, Hives, Itching, Nausea And Vomiting and Palpitations       Current Inpatient Medications:      No current facility-administered medications on file prior to encounter.     Current Outpatient Medications on File Prior to Encounter   Medication Sig Dispense Refill    ascorbic acid, vitamin C, (VITAMIN C) 500 MG tablet Take 500 mg by mouth once daily.      diphenhydrAMINE (BENADRYL) 25 mg capsule Take 1 capsule by mouth every evening.       DM/pseudoephed/acetaminophen (PSEUDOEPHED-DM-ACETAMINOPHEN ORAL) Take by mouth.      ELDERBERRY FRUIT AND FLOWER ORAL Take 1 tablet by mouth once daily.      famotidine (PEPCID) 20 MG tablet Take 20 mg by mouth 2 (two) times daily.      galcanezumab-gnlm (EMGALITY PEN) 120 mg/mL PnIj Inject 120 mg into the skin every 28 days. 3 each 3    ginkgo biloba 40 mg Tab Take by mouth.      lysine (L-LYSINE) 500 mg Tab Take 1,000 mg by mouth once daily.      melatonin 5 mg Chew Take 1 tablet by mouth every evening.      omega-3 fatty acids-fish oil (FISH OIL) 300-500 mg Cap Take 1 capsule by mouth once daily.      prenat.vits,zeenat,min-iron-folic (PRENATAL VITAMIN) Tab Take 1 tablet by mouth once daily.      VITAMIN B COMPLEX ORAL Take 1 tablet by mouth once daily.      vitamin D (VITAMIN D3) 1000 units Tab Take 1,000 Units by mouth once daily.      albuterol (PROVENTIL/VENTOLIN HFA) 90 mcg/actuation inhaler Inhale 2 puffs into the lungs every 6 (six) hours as needed.      NURTEC 75 mg odt PLEASE SEE ATTACHED FOR DETAILED DIRECTIONS         Past Surgical History:   Procedure Laterality Date    LAPAROSCOPY      x2 for endometriosis    LEG SURGERY      x8       Social History     Socioeconomic History    Marital status:        OBJECTIVE:     Vital Signs Range (Last 24H):         CBC:   No results for input(s): WBC, RBC, HGB, HCT, PLT, MCV, MCH, MCHC in the last 72 hours.    CMP: No results for input(s): NA, K, CL, CO2, BUN, CREATININE, GLU, MG, PHOS, CALCIUM, ALBUMIN, PROT, ALKPHOS, ALT, AST, BILITOT in the last 72 hours.    INR:  No results for input(s): PT, INR, PROTIME, APTT in the last 72 hours.    Diagnostic Studies: No relevant studies.    EKG:   No results found for this or any previous visit.     2D ECHO:   No results found for this or any previous visit.         ASSESSMENT/PLAN:       Pre-op Assessment    I have reviewed the Patient Summary Reports.     I have reviewed the Nursing Notes. I have  "reviewed the NPO Status.   I have reviewed the Medications.     Review of Systems  Anesthesia Hx:  Denies Hx of Anesthetic complications PATIENT REPORTS SHE WAS TOLD BY ANESTHESIA DURING HER SERIES OF LEG SURGERIES SHE HAS A "HIGH TOLERANCE FOR PAIN MEDICATIONS" History of prior surgery of interest to airway management or planning: Previous anesthesia: General LEG SURGERY 2012 with general anesthesia.  Denies Family Hx of Anesthesia complications.   Denies Personal Hx of Anesthesia complications.   Social:  Smoker, No Alcohol Use    Hematology/Oncology:  Hematology Normal   Oncology Normal     EENT/Dental:   CHRONIC SINUSITIS   Cardiovascular:    Denies Angina.  Functional Capacity good / => 4 METS   Hx of Heart Murmur     Pulmonary:   Asthma Denies Shortness of breath.  Denies Recent URI.    Renal/:  Renal/ Normal     Hepatic/GI:   GERD    Musculoskeletal:   HX OF LEG SURGERY x8 WITH HARDWARE   Neurological:   Headaches  Dx of Headaches, Migraine Headache   Endocrine:  Endocrine Normal    Psych:  Psychiatric Normal           Physical Exam  General: Well nourished, Cooperative, Alert and Oriented    Airway:  Mallampati: II   Mouth Opening: Normal  TM Distance: Normal  Tongue: Normal  Neck ROM: Normal ROM    Dental:  Intact    Chest/Lungs:  Clear to auscultation, Normal Respiratory Rate    Heart:  Rate: Normal  Rhythm: Regular Rhythm  Sounds: Normal        Anesthesia Plan  Type of Anesthesia, risks & benefits discussed:    Anesthesia Type: Gen ETT  Intra-op Monitoring Plan: Standard ASA Monitors  Post Op Pain Control Plan: multimodal analgesia and IV/PO Opioids PRN  Induction:  IV  Airway Plan: Direct and Video, Post-Induction  Informed Consent: Informed consent signed with the Patient and all parties understand the risks and agree with anesthesia plan.  All questions answered. Patient consented to blood products? No  ASA Score: 1  Day of Surgery Review of History & Physical: H&P Update referred to the " surgeon/provider.    Ready For Surgery From Anesthesia Perspective.     .

## 2022-07-21 ENCOUNTER — HOSPITAL ENCOUNTER (OUTPATIENT)
Facility: HOSPITAL | Age: 42
Discharge: HOME | End: 2022-07-21
Attending: STUDENT IN AN ORGANIZED HEALTH CARE EDUCATION/TRAINING PROGRAM | Admitting: STUDENT IN AN ORGANIZED HEALTH CARE EDUCATION/TRAINING PROGRAM
Payer: COMMERCIAL

## 2022-07-21 ENCOUNTER — ANESTHESIA (OUTPATIENT)
Dept: SURGERY | Facility: HOSPITAL | Age: 42
End: 2022-07-21
Payer: COMMERCIAL

## 2022-07-21 VITALS
TEMPERATURE: 98 F | RESPIRATION RATE: 17 BRPM | BODY MASS INDEX: 21.09 KG/M2 | HEART RATE: 59 BPM | WEIGHT: 119 LBS | SYSTOLIC BLOOD PRESSURE: 135 MMHG | HEIGHT: 63 IN | DIASTOLIC BLOOD PRESSURE: 65 MMHG | OXYGEN SATURATION: 100 %

## 2022-07-21 DIAGNOSIS — Z01.818 PRE-OP TESTING: ICD-10-CM

## 2022-07-21 DIAGNOSIS — J32.4 CHRONIC PANSINUSITIS: ICD-10-CM

## 2022-07-21 DIAGNOSIS — J32.9 CHRONIC SINUSITIS: Primary | ICD-10-CM

## 2022-07-21 LAB
APTT BLDCRRT: 30.2 SEC (ref 21–32)
B-HCG UR QL: NEGATIVE
CTP QC/QA: YES
INR PPP: 1 (ref 0.8–1.2)
PROTHROMBIN TIME: 10.4 SEC (ref 9–12.5)

## 2022-07-21 PROCEDURE — D9220A PRA ANESTHESIA: Mod: ,,, | Performed by: ANESTHESIOLOGY

## 2022-07-21 PROCEDURE — 31256 EXPLORATION MAXILLARY SINUS: CPT | Mod: 51,,, | Performed by: STUDENT IN AN ORGANIZED HEALTH CARE EDUCATION/TRAINING PROGRAM

## 2022-07-21 PROCEDURE — 25000003 PHARM REV CODE 250: Performed by: STUDENT IN AN ORGANIZED HEALTH CARE EDUCATION/TRAINING PROGRAM

## 2022-07-21 PROCEDURE — 85610 PROTHROMBIN TIME: CPT | Performed by: ANESTHESIOLOGY

## 2022-07-21 PROCEDURE — 25000003 PHARM REV CODE 250

## 2022-07-21 PROCEDURE — 63600175 PHARM REV CODE 636 W HCPCS

## 2022-07-21 PROCEDURE — 61782 SCAN PROC CRANIAL EXTRA: CPT | Mod: ,,, | Performed by: STUDENT IN AN ORGANIZED HEALTH CARE EDUCATION/TRAINING PROGRAM

## 2022-07-21 PROCEDURE — 27201423 OPTIME MED/SURG SUP & DEVICES STERILE SUPPLY: Performed by: STUDENT IN AN ORGANIZED HEALTH CARE EDUCATION/TRAINING PROGRAM

## 2022-07-21 PROCEDURE — 71000044 HC DOSC ROUTINE RECOVERY FIRST HOUR: Performed by: STUDENT IN AN ORGANIZED HEALTH CARE EDUCATION/TRAINING PROGRAM

## 2022-07-21 PROCEDURE — 36000710: Performed by: STUDENT IN AN ORGANIZED HEALTH CARE EDUCATION/TRAINING PROGRAM

## 2022-07-21 PROCEDURE — 81025 URINE PREGNANCY TEST: CPT | Performed by: STUDENT IN AN ORGANIZED HEALTH CARE EDUCATION/TRAINING PROGRAM

## 2022-07-21 PROCEDURE — 31257 NSL/SINS NDSC TOT W/SPHENDT: CPT | Mod: 50,,, | Performed by: STUDENT IN AN ORGANIZED HEALTH CARE EDUCATION/TRAINING PROGRAM

## 2022-07-21 PROCEDURE — 88305 TISSUE EXAM BY PATHOLOGIST: CPT | Mod: 26,,, | Performed by: STUDENT IN AN ORGANIZED HEALTH CARE EDUCATION/TRAINING PROGRAM

## 2022-07-21 PROCEDURE — C2625 STENT, NON-COR, TEM W/DEL SY: HCPCS | Performed by: STUDENT IN AN ORGANIZED HEALTH CARE EDUCATION/TRAINING PROGRAM

## 2022-07-21 PROCEDURE — 30520 PR REPAIR, NASAL SEPTUM: ICD-10-PCS | Mod: 51,,, | Performed by: STUDENT IN AN ORGANIZED HEALTH CARE EDUCATION/TRAINING PROGRAM

## 2022-07-21 PROCEDURE — D9220A PRA ANESTHESIA: ICD-10-PCS | Mod: ,,, | Performed by: ANESTHESIOLOGY

## 2022-07-21 PROCEDURE — 37000008 HC ANESTHESIA 1ST 15 MINUTES: Performed by: STUDENT IN AN ORGANIZED HEALTH CARE EDUCATION/TRAINING PROGRAM

## 2022-07-21 PROCEDURE — 25000003 PHARM REV CODE 250: Performed by: ANESTHESIOLOGY

## 2022-07-21 PROCEDURE — 61782 PR STEREOTACTIC COMP ASSIST PROC,CRANIAL,EXTRADURAL: ICD-10-PCS | Mod: ,,, | Performed by: STUDENT IN AN ORGANIZED HEALTH CARE EDUCATION/TRAINING PROGRAM

## 2022-07-21 PROCEDURE — 85730 THROMBOPLASTIN TIME PARTIAL: CPT | Performed by: ANESTHESIOLOGY

## 2022-07-21 PROCEDURE — 30930 THER FX NASAL INF TURBINATE: CPT | Mod: 51,,, | Performed by: STUDENT IN AN ORGANIZED HEALTH CARE EDUCATION/TRAINING PROGRAM

## 2022-07-21 PROCEDURE — 36000711: Performed by: STUDENT IN AN ORGANIZED HEALTH CARE EDUCATION/TRAINING PROGRAM

## 2022-07-21 PROCEDURE — 31276 NSL/SINS NDSC FRNT TISS RMVL: CPT | Mod: 50,51,, | Performed by: STUDENT IN AN ORGANIZED HEALTH CARE EDUCATION/TRAINING PROGRAM

## 2022-07-21 PROCEDURE — 71000016 HC POSTOP RECOV ADDL HR: Performed by: STUDENT IN AN ORGANIZED HEALTH CARE EDUCATION/TRAINING PROGRAM

## 2022-07-21 PROCEDURE — 31257 PR ENDOSCOPY, NASAL/SINUS, W/ETHMOIDECTOMY/SPHENOIDOTOMY: ICD-10-PCS | Mod: 50,,, | Performed by: STUDENT IN AN ORGANIZED HEALTH CARE EDUCATION/TRAINING PROGRAM

## 2022-07-21 PROCEDURE — 30930 PR THERAPUTIC FRACTURE INFER TURBINATE: ICD-10-PCS | Mod: 51,,, | Performed by: STUDENT IN AN ORGANIZED HEALTH CARE EDUCATION/TRAINING PROGRAM

## 2022-07-21 PROCEDURE — 37000009 HC ANESTHESIA EA ADD 15 MINS: Performed by: STUDENT IN AN ORGANIZED HEALTH CARE EDUCATION/TRAINING PROGRAM

## 2022-07-21 PROCEDURE — 31256 PR NASAL/SINUS ENDOSCOPY,OPEN MAXILL SINUS: ICD-10-PCS | Mod: 51,,, | Performed by: STUDENT IN AN ORGANIZED HEALTH CARE EDUCATION/TRAINING PROGRAM

## 2022-07-21 PROCEDURE — 88305 TISSUE EXAM BY PATHOLOGIST: ICD-10-PCS | Mod: 26,,, | Performed by: STUDENT IN AN ORGANIZED HEALTH CARE EDUCATION/TRAINING PROGRAM

## 2022-07-21 PROCEDURE — 30520 REPAIR OF NASAL SEPTUM: CPT | Mod: 51,,, | Performed by: STUDENT IN AN ORGANIZED HEALTH CARE EDUCATION/TRAINING PROGRAM

## 2022-07-21 PROCEDURE — C1894 INTRO/SHEATH, NON-LASER: HCPCS | Performed by: STUDENT IN AN ORGANIZED HEALTH CARE EDUCATION/TRAINING PROGRAM

## 2022-07-21 PROCEDURE — 71000015 HC POSTOP RECOV 1ST HR: Performed by: STUDENT IN AN ORGANIZED HEALTH CARE EDUCATION/TRAINING PROGRAM

## 2022-07-21 PROCEDURE — 88305 TISSUE EXAM BY PATHOLOGIST: CPT | Mod: 59 | Performed by: STUDENT IN AN ORGANIZED HEALTH CARE EDUCATION/TRAINING PROGRAM

## 2022-07-21 PROCEDURE — 31276 PR NASAL/SINUS ENDOSCOPY,EXPLOR FRONTAL SINUS: ICD-10-PCS | Mod: 50,51,, | Performed by: STUDENT IN AN ORGANIZED HEALTH CARE EDUCATION/TRAINING PROGRAM

## 2022-07-21 DEVICE — IMPLANT PROPEL MOMETASON 8MM: Type: IMPLANTABLE DEVICE | Site: SINUS | Status: FUNCTIONAL

## 2022-07-21 RX ORDER — ROCURONIUM BROMIDE 10 MG/ML
INJECTION, SOLUTION INTRAVENOUS
Status: DISCONTINUED | OUTPATIENT
Start: 2022-07-21 | End: 2022-07-21

## 2022-07-21 RX ORDER — FENTANYL CITRATE 50 UG/ML
INJECTION, SOLUTION INTRAMUSCULAR; INTRAVENOUS
Status: DISCONTINUED
Start: 2022-07-21 | End: 2022-07-21 | Stop reason: HOSPADM

## 2022-07-21 RX ORDER — PROPOFOL 10 MG/ML
VIAL (ML) INTRAVENOUS
Status: DISCONTINUED | OUTPATIENT
Start: 2022-07-21 | End: 2022-07-21

## 2022-07-21 RX ORDER — MIDAZOLAM HYDROCHLORIDE 1 MG/ML
INJECTION, SOLUTION INTRAMUSCULAR; INTRAVENOUS
Status: DISCONTINUED | OUTPATIENT
Start: 2022-07-21 | End: 2022-07-21

## 2022-07-21 RX ORDER — ACETAMINOPHEN 500 MG
1000 TABLET ORAL ONCE
Status: COMPLETED | OUTPATIENT
Start: 2022-07-21 | End: 2022-07-21

## 2022-07-21 RX ORDER — ACETAMINOPHEN 325 MG/1
650 TABLET ORAL EVERY 6 HOURS PRN
Qty: 40 TABLET | Refills: 0 | Status: SHIPPED | OUTPATIENT
Start: 2022-07-21 | End: 2023-10-17

## 2022-07-21 RX ORDER — DEXAMETHASONE SODIUM PHOSPHATE 4 MG/ML
INJECTION, SOLUTION INTRA-ARTICULAR; INTRALESIONAL; INTRAMUSCULAR; INTRAVENOUS; SOFT TISSUE
Status: DISCONTINUED | OUTPATIENT
Start: 2022-07-21 | End: 2022-07-21

## 2022-07-21 RX ORDER — LIDOCAINE HYDROCHLORIDE 20 MG/ML
INJECTION, SOLUTION EPIDURAL; INFILTRATION; INTRACAUDAL; PERINEURAL
Status: DISCONTINUED | OUTPATIENT
Start: 2022-07-21 | End: 2022-07-21

## 2022-07-21 RX ORDER — DEXMEDETOMIDINE HYDROCHLORIDE 100 UG/ML
INJECTION, SOLUTION INTRAVENOUS
Status: DISCONTINUED | OUTPATIENT
Start: 2022-07-21 | End: 2022-07-21

## 2022-07-21 RX ORDER — SODIUM CHLORIDE 9 MG/ML
INJECTION, SOLUTION INTRAVENOUS CONTINUOUS
Status: DISCONTINUED | OUTPATIENT
Start: 2022-07-21 | End: 2022-07-21 | Stop reason: HOSPADM

## 2022-07-21 RX ORDER — IBUPROFEN 200 MG
800 TABLET ORAL ONCE
Status: COMPLETED | OUTPATIENT
Start: 2022-07-21 | End: 2022-07-21

## 2022-07-21 RX ORDER — ONDANSETRON 2 MG/ML
INJECTION INTRAMUSCULAR; INTRAVENOUS
Status: DISCONTINUED | OUTPATIENT
Start: 2022-07-21 | End: 2022-07-21

## 2022-07-21 RX ORDER — ONDANSETRON 8 MG/1
8 TABLET, ORALLY DISINTEGRATING ORAL EVERY 8 HOURS PRN
Qty: 15 TABLET | Refills: 0 | Status: SHIPPED | OUTPATIENT
Start: 2022-07-21

## 2022-07-21 RX ORDER — KETAMINE HCL IN 0.9 % NACL 50 MG/5 ML
SYRINGE (ML) INTRAVENOUS
Status: DISCONTINUED | OUTPATIENT
Start: 2022-07-21 | End: 2022-07-21

## 2022-07-21 RX ORDER — CEFAZOLIN SODIUM/WATER 2 G/20 ML
SYRINGE (ML) INTRAVENOUS
Status: DISCONTINUED | OUTPATIENT
Start: 2022-07-21 | End: 2022-07-21

## 2022-07-21 RX ORDER — FENTANYL CITRATE 50 UG/ML
INJECTION, SOLUTION INTRAMUSCULAR; INTRAVENOUS
Status: DISCONTINUED | OUTPATIENT
Start: 2022-07-21 | End: 2022-07-21

## 2022-07-21 RX ORDER — FENTANYL CITRATE 50 UG/ML
25 INJECTION, SOLUTION INTRAMUSCULAR; INTRAVENOUS EVERY 30 MIN PRN
Status: COMPLETED | OUTPATIENT
Start: 2022-07-21 | End: 2022-07-21

## 2022-07-21 RX ORDER — HALOPERIDOL 5 MG/ML
0.5 INJECTION INTRAMUSCULAR EVERY 10 MIN PRN
Status: ACTIVE | OUTPATIENT
Start: 2022-07-21

## 2022-07-21 RX ORDER — IBUPROFEN 800 MG/1
800 TABLET ORAL EVERY 6 HOURS PRN
Qty: 20 TABLET | Refills: 0 | Status: SHIPPED | OUTPATIENT
Start: 2022-07-21

## 2022-07-21 RX ORDER — LIDOCAINE HYDROCHLORIDE AND EPINEPHRINE 10; 10 MG/ML; UG/ML
INJECTION, SOLUTION INFILTRATION; PERINEURAL
Status: DISCONTINUED | OUTPATIENT
Start: 2022-07-21 | End: 2022-07-21 | Stop reason: HOSPADM

## 2022-07-21 RX ORDER — LIDOCAINE HYDROCHLORIDE 10 MG/ML
1 INJECTION, SOLUTION EPIDURAL; INFILTRATION; INTRACAUDAL; PERINEURAL ONCE AS NEEDED
Status: COMPLETED | OUTPATIENT
Start: 2022-07-21 | End: 2022-07-21

## 2022-07-21 RX ORDER — SCOLOPAMINE TRANSDERMAL SYSTEM 1 MG/1
1 PATCH, EXTENDED RELEASE TRANSDERMAL
Status: DISCONTINUED | OUTPATIENT
Start: 2022-07-21 | End: 2022-07-21 | Stop reason: HOSPADM

## 2022-07-21 RX ADMIN — MIDAZOLAM 2 MG: 1 INJECTION INTRAMUSCULAR; INTRAVENOUS at 11:07

## 2022-07-21 RX ADMIN — FENTANYL CITRATE 25 MCG: 50 INJECTION INTRAMUSCULAR; INTRAVENOUS at 04:07

## 2022-07-21 RX ADMIN — DEXMEDETOMIDINE HYDROCHLORIDE 4 MCG: 100 INJECTION, SOLUTION INTRAVENOUS at 01:07

## 2022-07-21 RX ADMIN — DEXMEDETOMIDINE HYDROCHLORIDE 8 MCG: 100 INJECTION, SOLUTION INTRAVENOUS at 01:07

## 2022-07-21 RX ADMIN — Medication 10 MG: at 01:07

## 2022-07-21 RX ADMIN — ACETAMINOPHEN 1000 MG: 500 TABLET ORAL at 10:07

## 2022-07-21 RX ADMIN — Medication 20 MG: at 12:07

## 2022-07-21 RX ADMIN — ROCURONIUM BROMIDE 10 MG: 10 INJECTION INTRAVENOUS at 02:07

## 2022-07-21 RX ADMIN — FENTANYL CITRATE 25 MCG: 50 INJECTION INTRAMUSCULAR; INTRAVENOUS at 03:07

## 2022-07-21 RX ADMIN — DEXAMETHASONE SODIUM PHOSPHATE 8 MG: 4 INJECTION, SOLUTION INTRAMUSCULAR; INTRAVENOUS at 12:07

## 2022-07-21 RX ADMIN — SODIUM CHLORIDE: 0.9 INJECTION, SOLUTION INTRAVENOUS at 10:07

## 2022-07-21 RX ADMIN — FENTANYL CITRATE 100 MCG: 50 INJECTION INTRAMUSCULAR; INTRAVENOUS at 11:07

## 2022-07-21 RX ADMIN — LIDOCAINE HYDROCHLORIDE 60 MG: 20 INJECTION, SOLUTION EPIDURAL; INFILTRATION; INTRACAUDAL; PERINEURAL at 12:07

## 2022-07-21 RX ADMIN — PROPOFOL 160 MG: 10 INJECTION, EMULSION INTRAVENOUS at 12:07

## 2022-07-21 RX ADMIN — Medication 2 G: at 12:07

## 2022-07-21 RX ADMIN — Medication 10 MG: at 12:07

## 2022-07-21 RX ADMIN — LIDOCAINE HYDROCHLORIDE 100 MG: 20 INJECTION, SOLUTION EPIDURAL; INFILTRATION; INTRACAUDAL; PERINEURAL at 02:07

## 2022-07-21 RX ADMIN — IBUPROFEN 800 MG: 200 TABLET, FILM COATED ORAL at 03:07

## 2022-07-21 RX ADMIN — LIDOCAINE HYDROCHLORIDE 10 MG: 10 INJECTION, SOLUTION EPIDURAL; INFILTRATION; INTRACAUDAL at 10:07

## 2022-07-21 RX ADMIN — SUGAMMADEX 200 MG: 100 INJECTION, SOLUTION INTRAVENOUS at 02:07

## 2022-07-21 RX ADMIN — SCOPALAMINE 1 PATCH: 1 PATCH, EXTENDED RELEASE TRANSDERMAL at 10:07

## 2022-07-21 RX ADMIN — ROCURONIUM BROMIDE 10 MG: 10 INJECTION INTRAVENOUS at 01:07

## 2022-07-21 RX ADMIN — ROCURONIUM BROMIDE 10 MG: 10 INJECTION INTRAVENOUS at 12:07

## 2022-07-21 RX ADMIN — ONDANSETRON 4 MG: 2 INJECTION INTRAMUSCULAR; INTRAVENOUS at 02:07

## 2022-07-21 RX ADMIN — ROCURONIUM BROMIDE 40 MG: 10 INJECTION INTRAVENOUS at 12:07

## 2022-07-21 NOTE — OP NOTE
7/21/2022     PREOPERATIVE DIAGNOSIS(ES):    1.   Chronic sinusitis without nasal polyposis.  2.   Nasal septal deviation.      POSTOPERATIVE DIAGNOSIS(ES):    1.   Same     PROCEDURE:    1.   Bilateral maxillary antrostomy with removal of tissue.  2.   Bilateral ethmoidectomy, complete.  3.   Bilateral sphenoidotomy with removal of tissue.  4.   Bilateral frontal sinusotomy.  5.   Septoplasty.  6.   Outfracture of the inferior turbinates.  7.   Use of [employer]onic neuronavigation.     ANESTHESIA:  General endotracheal.     SURGEON(S):  Delvis Galloway MD.     ASSISTANT:  Gigi Padilla MD.     ESTIMATED BLOOD LOSS:  Minimal.     COMPLICATIONS:  None     FINDINGS:    1.   Purulent fluid in retained mucosal cyst in left frontal outflow tract.   2.   Bilateral Draf 2a sinusotomy.  3.   Left sided septal deviation.      INDICATIONS FOR PROCEDURE:  This is a 42-year-old female with a history of chronic sinusitis presenting with significant facial pressure and pain. She was treated with Flonase, however need up in the ED with excruciating facial pain.  Ct imaging showed mucosal thickening and narrowing of the OMCs. She has also had a significant migrane workup including MRIs and botox injections. We discussed that sinus surgery may not improve the pressure she is feeling in her face/sinuses.     The risks, benefits, and alternatives of surgery were discussed at length with the patient and include, but are not limited to bleeding, infection, need for revision surgery, injury to the eye or brain, double vision, loss of vision, cerebrospinal fluid leakage, meningitis, chronic sinusitis, nasal obstruction, nasal septal perforation, numbness to teeth or cheek, or need for time and expense off work.  The patient demonstrates understanding and wishes to proceed.  All of her questions were answered to her satisfaction.     DESCRIPTION OF PROCEDURE:  The patient was taken back to the operating room and after successful induction of  general anesthesia, was prepped and draped in a sterile fashion.  The [employer] neuronavigation system was employed.  The images were uploaded, the instruments were calibrated, and accuracy was confirmed.  Afrin pledgets were used intranasally for decongestion, and approximately 10 cc of 1% lidocaine with epinephrine 1:100,000 was used intranasally.     A pre-operative timeout was taken to confirm the correct patient and procedure being performed.      Attention was first turned to the left side.  There was a left sided septal deformity, which was preventing access into the paranasal sinuses.  As such, a septoplasty was performed.  A left-sided hemitransfixion incision was made and a mucoperichondrial/mucoperiosteal flap was elevated. The bony cartilaginous junction was divided and the bony deviated septum was removed leading to good straightening of the nasal septum.  Once the septum was straight and both nasal cavities could be accessed, the left-sided hemitransfixion incision was closed using a 4-0 chromic gut suture followed by a 4-0 plain gut suture in a mattress fashion.      Attention was then turned to the right side.  The uncinate process was then identified and taken down in a standard fashion, and the natural maxillary sinus ostium was identified and opened widely using straight and backbiting instruments. The inferior turbinate was outfractured.  Attention was then returned to the middle meatus.  Using a Xomed microdebrider, the ethmoid bulla was perforated and dissection was carried posteriorly until the sphenoid face was reached.  Dissection then proceeded anteriorly along the skull base and lamina papyracea in order to skeletonize these structures.  Next, the natural sphenoid ostium was identified and the sphenoid sinus was opened widely using 2 and 3 mm Koros Kerrison rongeurs and communicated into the ethmoid cavity.  Next, a 30-degree endoscope and frontal sinus suction were then used to complete  the anterior ethmoidectomy.  The natural frontal sinus outflow tract was identified, cannulated, and the frontal sinus was opened widely in a submucosal fashion.       Attention was then turned to the left side and a similar procedure was performed.  The uncinate process was identified and taken down in a standard fashion.  The natural maxillary sinus ostium was identified and opened widely using straight and backbiting instruments. Next, attention was returned to the ethmoid.  Using a Xomed microdebrider, the ethmoid bulla was perforated and dissection was carried posteriorly until the sphenoid face was reached.  Dissection then was carried anteriorly along the skull base and lamina papyracea in order to skeletonize these structures.  Next, the natural sphenoid ostium was identified and the sphenoid sinus was opened widely using 2 and 3 mm Koros Kerrison rongeurs and communicated into the ethmoid cavity.  Next, a 30-degree endoscope and frontal sinus suction were then used to complete the anterior ethmoidectomy.  The natural frontal sinus outflow tract was identified, cannulated, and the frontal sinus was opened widely in a submucosal fashion       At the conclusion of the case, the nose was copiously irrigated with saline solution to remove all debris and both inferior turbinates were outfractured.  Hemostasis was obtained throughout using suction monopolar cautery as well as dilute epinephrine/saline pledgets until hemostasis was achieved.  Propel Contour stents were placed in the frontal outflow tracts to maintain patency and NasoPore was placed in the  middle meatus to ensure appropriate middle turbinate medialization.     The patient was then awakened, extubated, and taken to recovery room in stable condition.      I was scrubbed and personally present during all portions of this procedure, and I was immediately available throughout the entire procedure.    All sponge and needle counts were correct at the end  of the case x2.     Delvis Galloway MD

## 2022-07-21 NOTE — PATIENT INSTRUCTIONS
INSTRUCTIONS TO FOLLOW AFTER SINUS AND NASAL SURGERY  DR. BARTON - [facility]    Office hours:  Weekdays 8:00 am to 5:00 pm.  Please call [phone] and ask to speak with his nurse, Marion.    After-hours & weekends:  Please call [phone] and ask to speak with the ENT resident doctor.    Your first office visit with Dr. Galloway after surgery should have been already scheduled.  If you dont know when it is, call Dr. Galloway's nurse Marion at [phone].    Please call IMMMEDIATELY if you have:  Temperature of 101° F or greater  Any unusual, painful swelling  Any active bleeding that saturates more than a 4x4 gauze  Any thick drainage green or yellow drainage  Changes in vision or swelling around the eye  Pain not relieved by your prescribed pain medication    ACTIVITY:    Sleep on your back with the head of the bead elevated, up on 2-3 pillows, or in a recliner for the first 3 to 5 days. This will help with swelling.     After surgery you may have a lot of nasal drainage. This is normal. You may breathe through your nose if youre able but avoid inhaling forcibly. Let all drainage fall on your mustache dressing and change it as needed.    You may wake up after surgery with thick white stockings on. Wear them until you are walking around more. It is important to walk around often while at home to keep your blood circulating and prevent blood clots.    If you use CPAP or BiPAP to sleep at night, you should wait at least 48 hours before resuming use.  Dr. Galloway will advise you when it is safe to do this.    You may shower 24 hours after surgery.    RESTRICTED ACTIVITIES AFTER SURGERY:    Do NOT blow your nose until you see Dr. Galloway in clinic. If you have to sneeze or cough, do so with your mouth open.     AVOID all heavy lifting, straining or bending for 2 weeks.     AVOID any sexual activity for 2 weeks after surgery.    AVOID semi-contact sports or vigorous exercising for 3-4 weeks. Dr. Galloway will let you know  when you are cleared to resume exercise.    AVOID flying or swimming for 2 weeks.      Do NOT operate a motor vehicle or any type of heavy machinery within 24 hours of taking pain medication.    DO NOT smoke or be around smokers.    AVOID irritating substances that might make you sneeze, such as dust, chalk, harsh chemicals, and allergic triggers.  This might also include spicy foods.    DRESSINGS:    Change the gauze mustache dressing under your nose as needed. (If unsure what this dressing is or how to do this, ask your doctor or nurse before you leave the hospital.) You may have pinkish-red drainage for 2-3 days.    Usually there is no gauze packing placed inside the nose.  If packing is necessary, you will be informed by your surgeon.  Do not touch or pull at the packing. The packing will be removed by your doctor at your first visit after surgery.     You may also have a dissolvable stent or dissolvable sponge placed into the sinuses during surgery.  These usually do not need to be removed unless you are told otherwise by Dr. Galloway.  You may notice small fragments of these items come out of your nose in the weeks following surgery.    MEDICATIONS:    After surgery, you will be sent home with prescriptions for pain medication and an anti-nausea medication.  Antibiotics are usually not necessary.    Most people need pain medication for the first few days after surgery, although a narcotic is rarely necessary.  The best pain control comes from a combination of acetaminophen (Tylenol) and ibuprofen (Motrin).  You will be given prescriptions for these at the recommended dose.  These can be alternated so that you are taking something every 2 or 3 hours.    Some people have problems with bowel movements after surgery. If you have NOT had a bowel movement 3-5 days after surgery, go to your local pharmacy and purchase an over the counter stool softener such as COLACE. You can also ask the pharmacist for his or her  recommendation. If you still do not have a bowel movement after starting the softener, please call the office.    You will need these over-the-counter medications after surgery:    SALINE SINUS RINSE (Antony Med brand):  You will use this to rinse out your nose and sinuses after surgery.  Begin doing this the day after surgery, unless instructed otherwise by Dr. Galloway.  You should do this 2 times a day, following the instructions on the box.  AFRIN (regular strength): Only use if you have nasal congestion or bleeding. Use 2 times per day for 3 days, stop for 1 day, continue 2 times per day for 3 days, then stop completely.  NOTE:  You may not need to do this at all.    DIET:    Avoid hot and spicy foods for 1 week after surgery.    Begin with bland foods the evening after surgery and advance to your regular diet as tolerated.  It is not necessary to take only soft food unless you are recovering from tonsil surgery.    Drink plenty of fluids (water is best).     Avoid alcoholic and caffeinated beverages for 1 week after surgery because they can cause you to become dehydrated.

## 2022-07-21 NOTE — BRIEF OP NOTE
[facility] - Surgery ([facility])  Brief Operative Note    Surgery Date: 7/21/2022     Surgeon(s) and Role:     * Lorraine Glaloway MD - Primary     * Chapo Padilla MD - Resident - Assisting      Pre-op Diagnosis:  Chronic sinusitis, unspecified location [J32.9]    Post-op Diagnosis:  Post-Op Diagnosis Codes:     * Chronic sinusitis, unspecified location [J32.9]    Procedure(s) (LRB):  FESS, USING COMPUTER-ASSISTED NAVIGATION (Bilateral)  SEPTOPLASTY, NOSE, WITH NASAL TURBINATE REDUCTION (N/A)    Anesthesia: General    Operative Findings: nasal septal deviation to the left, jeremi cells bilaterally, purulence in left frontal sinus and right jeremi cell    Estimated Blood Loss: * No values recorded between 7/21/2022 12:32 PM and 7/21/2022  2:56 PM *         Specimens:   Specimen (24h ago, onward)             Start     Ordered    07/21/22 1423  Specimen to Pathology, Surgery ENT  Once        Comments: Pre-op Diagnosis: Chronic sinusitis, unspecified location [J32.9]Procedure(s):FESS, USING COMPUTER-ASSISTED NAVIGATIONSEPTOPLASTY, NOSE, WITH NASAL TURBINATE REDUCTION Number of specimens: 1Name of specimens: 1. LEFT FRONTAL FOR PERMANENT     References:    Click here for ordering Quick Tip   Question Answer Comment   Procedure Type: ENT    Specimen Class: Routine/Screening    Which provider would you like to cc? LORRAINE GALLOWAY    Release to patient Immediate        07/21/22 1432    07/21/22 1314  Specimen to Pathology, Surgery ENT  Once        Comments: Pre-op Diagnosis: Chronic sinusitis, unspecified location [J32.9]Procedure(s):FESS, USING COMPUTER-ASSISTED NAVIGATIONSEPTOPLASTY, NOSE, WITH NASAL TURBINATE REDUCTION Number of specimens: 7Name of specimens: 1. Right Maxillary sinus - permanent2. Right Ethmoid - permanent3. Right Sphenoid - permanent4. Right Frontal Sinus - permanent5. Left Maxillary sinus - permanent6. Left Ethmoid - permanent7. Left Sphenoid - permanent     References:    Click here for ordering Quick  Tip   Question Answer Comment   Procedure Type: ENT    Specimen Class: Known or suspected malignancy    Which provider would you like to cc? LORRAINE PRESTON    Release to patient Immediate        07/21/22 3736                  Discharge Note    OUTCOME: Patient tolerated treatment/procedure well without complication and is now ready for discharge.    DISPOSITION: Home or Self Care    FINAL DIAGNOSIS:  Chronic sinusitis    FOLLOWUP: In clinic    DISCHARGE INSTRUCTIONS:    Discharge Procedure Orders   Diet Adult Regular     Other restrictions (specify):   Order Comments: No heavy lifting (nothing more than 10 lbs) for 2 weeks. No strenuous activity for 2 weeks. Do not blow nose. Sneeze with mouth open.     Notify your health care provider if you experience any of the following:  redness, tenderness, or signs of infection (pain, swelling, redness, odor or green/yellow discharge around incision site)     Notify your health care provider if you experience any of the following:  difficulty breathing or increased cough     Notify your health care provider if you experience any of the following:  severe uncontrolled pain     Notify your health care provider if you experience any of the following:  persistent nausea and vomiting or diarrhea     No dressing needed   Order Comments: Can use mustache dressing as needed for bloody oozing (expected over the next couple of days). Start saline rinses tomorrow.

## 2022-07-21 NOTE — ANESTHESIA PROCEDURE NOTES
Intubation    Date/Time: 7/21/2022 12:04 PM  Performed by: Popeye Phelps MD  Authorized by: Horacio Alegria MD     Intubation:     Induction:  Intravenous    Intubated:  Postinduction    Mask Ventilation:  Easy mask    Attempts:  1    Attempted By:  Student    Method of Intubation:  Direct    Blade:  Kwabena 3    Laryngeal View Grade: Grade I - full view of cords      Difficult Airway Encountered?: No      Complications:  None    Airway Device:  Oral endotracheal tube    Airway Device Size:  7.0    Style/Cuff Inflation:  Cuffed    Inflation Amount (mL):  7    Tube secured:  21    Secured at:  The teeth    Placement Verified By:  Capnometry

## 2022-07-21 NOTE — TRANSFER OF CARE
"Anesthesia Transfer of Care Note    Patient: Wanda Singleton    Procedure(s) Performed: Procedure(s) (LRB):  FESS, USING COMPUTER-ASSISTED NAVIGATION (Bilateral)  SEPTOPLASTY, NOSE, WITH NASAL TURBINATE REDUCTION (N/A)    Patient location: PACU    Anesthesia Type: general    Transport from OR: Transported from OR on 6-10 L/min O2 by face mask with adequate spontaneous ventilation    Post pain: adequate analgesia    Post assessment: no apparent anesthetic complications    Post vital signs: stable    Level of consciousness: awake and alert    Nausea/Vomiting: no nausea/vomiting    Complications: none    Transfer of care protocol was followed      Last vitals:   Visit Vitals  /68 (BP Location: Right arm, Patient Position: Lying)   Pulse 68   Temp 36.6 °C (97.9 °F) (Oral)   Resp 16   Ht 5' 3" (1.6 m)   Wt 54 kg (119 lb)   LMP 06/08/2022   SpO2 100%   Breastfeeding No   BMI 21.08 kg/m²     "

## 2022-07-21 NOTE — ANESTHESIA POSTPROCEDURE EVALUATION
Anesthesia Post Evaluation    Patient: Wanda Singleton    Procedure(s) Performed: Procedure(s) (LRB):  FESS, USING COMPUTER-ASSISTED NAVIGATION (Bilateral)  SEPTOPLASTY, NOSE, WITH NASAL TURBINATE REDUCTION (N/A)    Final Anesthesia Type: general      Patient location during evaluation: PACU  Patient participation: Yes- Able to Participate  Level of consciousness: awake and alert and oriented  Post-procedure vital signs: reviewed and stable  Pain management: adequate  Airway patency: patent  TEDDY mitigation strategies: Intraoperative administration of CPAP, nasopharyngeal airway, or oral appliance during sedation, Multimodal analgesia, Extubation while patient is awake, Verification of full reversal of neuromuscular block and Extubation and recovery carried out in lateral, semiupright, or other nonsupine position  PONV status at discharge: No PONV  Anesthetic complications: no      Cardiovascular status: hemodynamically stable  Respiratory status: unassisted  Hydration status: euvolemic  Follow-up not needed.          Vitals Value Taken Time   /67 07/21/22 1647   Temp 36.6 °C (97.8 °F) 07/21/22 1459   Pulse 62 07/21/22 1703   Resp 18 07/21/22 1532   SpO2 100 % 07/21/22 1703   Vitals shown include unvalidated device data.      No case tracking events are documented in the log.      Pain/Anthony Score: Pain Rating Prior to Med Admin: 8 (7/21/2022  3:36 PM)  Anthony Score: 9 (7/21/2022  3:15 PM)

## 2022-07-27 ENCOUNTER — PROCEDURE VISIT (OUTPATIENT)
Dept: NEUROLOGY | Facility: CLINIC | Age: 42
End: 2022-07-27
Payer: COMMERCIAL

## 2022-07-27 VITALS
BODY MASS INDEX: 21.09 KG/M2 | HEIGHT: 63 IN | HEART RATE: 83 BPM | SYSTOLIC BLOOD PRESSURE: 112 MMHG | DIASTOLIC BLOOD PRESSURE: 73 MMHG | WEIGHT: 119.06 LBS

## 2022-07-27 DIAGNOSIS — G43.011 INTRACTABLE MIGRAINE WITHOUT AURA AND WITH STATUS MIGRAINOSUS: Primary | ICD-10-CM

## 2022-07-27 DIAGNOSIS — J32.8 OTHER CHRONIC SINUSITIS: ICD-10-CM

## 2022-07-27 PROCEDURE — 99499 NO LOS: ICD-10-PCS | Mod: S$GLB,,, | Performed by: PHYSICIAN ASSISTANT

## 2022-07-27 PROCEDURE — 64615 PR CHEMODENERVATION OF MUSCLE FOR CHRONIC MIGRAINE: ICD-10-PCS | Mod: S$GLB,,, | Performed by: PHYSICIAN ASSISTANT

## 2022-07-27 PROCEDURE — 99499 UNLISTED E&M SERVICE: CPT | Mod: S$GLB,,, | Performed by: PHYSICIAN ASSISTANT

## 2022-07-27 PROCEDURE — 64615 CHEMODENERV MUSC MIGRAINE: CPT | Mod: S$GLB,,, | Performed by: PHYSICIAN ASSISTANT

## 2022-07-27 NOTE — PROCEDURES
SUBJECTIVE:  Patient ID: Wanda Singleton  Chief Complaint: Botulinum Toxin Injection      History of Present Illness:  Wanda Singleton is a 42 y.o. female with with migraines, chronic sinusitis, deviated septum, pituitary cyst, endometriosis, COVID 19 infection who presents to clinic with  for follow-up of headaches and Botox injections.       07/27/2022- Interval History: botox  Pt and  report Ha's have continued. Pt received sinus surgery last Thursday and was HA free for 3 days. However, she has since had increased pain around site with no drainage appreciated. Is pending an appt w/ ENT in 2 days to eval. Denies fevers or purulent drainage. Would like to avoid any adjustments to HA regimen while pending this appt. Pt and  note increased anxiety since this procedure, defer referrals.   Plan: continue current regimen (botox, emgality, and nurtec prn), continue recs and f/up per ent, consider referral to therapist for anxiety, rtc in 12 wks for next botox    04/28/2022- Interval History: botox #2  After first botox, HA's became less severe x 2 months but  still constant. Had an initial 5-7 days of tightness after botox, no other SE's noted. Of note, pt is taking sudafed daily, is pending ENT appt to discuss alternative options.   Continues med mgmt for her DASH's per Dr. Meza. Recently started on Nurtec.   RTC in 12 wks w/ me for next botox.     01/20/2022-  botox #1    Recommendations made at last Office Visit on 12/15/2021:  - Discussed symptoms appear to be consistent with migraines and rebound Ha's, discussed treatment options and patient agreed with the following plan:  - continue med mgmt w/ Dr. Meza including emgality monthly and d/c of fioricet 2/2 rebound Dash's  - will begin botox with me  - defers ONB  - recently completed steroid taper  - pituitary cyst - continue recs per nsgy including seeing ophtho and repeat MRI brain  - risks, benefits, and potential side effects  of botox discussed   - alternative treatment options offered   - importance of healthy diet, regular exercise and sleep hygiene in the treatment of headaches    - Start tracking headaches via Migraine [employer] sosa on phone   - RTC in 4 wks to begin botox    Treatments Tried:   depakote  emgality - somewhat helps  botox - somewhat helps  pamelor  toprol  Baclofen  Tylenol  excedrin  alezi  firyanet  ubrelvy  nurtec - didn't help  toradol  Vistaril  Meclizine  Steroid taper - didn't help    Current Medications:    Current Outpatient Medications:     acetaminophen (TYLENOL) 325 MG tablet, Take 2 tablets (650 mg total) by mouth every 6 (six) hours as needed for Pain. Alternate with ibuprofen every 3 hours as needed, Disp: 40 tablet, Rfl: 0    albuterol (PROVENTIL/VENTOLIN HFA) 90 mcg/actuation inhaler, Inhale 2 puffs into the lungs every 6 (six) hours as needed., Disp: , Rfl:     ascorbic acid, vitamin C, (VITAMIN C) 500 MG tablet, Take 500 mg by mouth once daily., Disp: , Rfl:     diphenhydrAMINE (BENADRYL) 25 mg capsule, Take 1 capsule by mouth every evening., Disp: , Rfl:     DM/pseudoephed/acetaminophen (PSEUDOEPHED-DM-ACETAMINOPHEN ORAL), Take by mouth., Disp: , Rfl:     ELDERBERRY FRUIT AND FLOWER ORAL, Take 1 tablet by mouth once daily., Disp: , Rfl:     famotidine (PEPCID) 20 MG tablet, Take 20 mg by mouth 2 (two) times daily., Disp: , Rfl:     galcanezumab-gnlm (EMGALITY PEN) 120 mg/mL PnIj, Inject 120 mg into the skin every 28 days., Disp: 3 each, Rfl: 3    ginkgo biloba 40 mg Tab, Take by mouth., Disp: , Rfl:     ibuprofen (ADVIL,MOTRIN) 800 MG tablet, Take 1 tablet (800 mg total) by mouth every 6 (six) hours as needed for Pain. Alternate with tylenol every 3 hours as needed., Disp: 20 tablet, Rfl: 0    lysine (L-LYSINE) 500 mg Tab, Take 1,000 mg by mouth once daily., Disp: , Rfl:     melatonin 5 mg Chew, Take 1 tablet by mouth every evening., Disp: , Rfl:     NURTEC 75 mg odt, PLEASE SEE ATTACHED  "FOR DETAILED DIRECTIONS, Disp: , Rfl:     omega-3 fatty acids-fish oil (FISH OIL) 300-500 mg Cap, Take 1 capsule by mouth once daily., Disp: , Rfl:     ondansetron (ZOFRAN-ODT) 8 MG TbDL, Dissolve 1 tablet (8 mg total) by mouth every 8 (eight) hours as needed (nausea)., Disp: 15 tablet, Rfl: 0    prenat.vits,zeenat,min-iron-folic (PRENATAL VITAMIN) Tab, Take 1 tablet by mouth once daily., Disp: , Rfl:     VITAMIN B COMPLEX ORAL, Take 1 tablet by mouth once daily., Disp: , Rfl:     vitamin D (VITAMIN D3) 1000 units Tab, Take 1,000 Units by mouth once daily., Disp: , Rfl:   No current facility-administered medications for this visit.    Facility-Administered Medications Ordered in Other Visits:     haloperidol lactate injection 0.5 mg, 0.5 mg, Intravenous, Q10 Min PRN, Popeye Phelps MD    Review of Systems - as per HPI, otherwise a balanced 10 systems review is negative.    OBJECTIVE:  Vitals:  /73   Pulse 83   Ht 5' 3" (1.6 m)   Wt 54 kg (119 lb 0.8 oz)   BMI 21.09 kg/m²     Physical Exam:  Constitutional: she appears well-developed and well-nourished. she is well groomed. NAD   HENT:    Head: Normocephalic and atraumatic  Eyes: Conjunctivae and EOM are normal  Musculoskeletal: Normal range of motion. No joint stiffness.   Skin: Skin is warm and dry.  Psychiatric: Mood and affect are normal    Neuro: Patient is alert and oriented to person, place, and time. Language is intact and fluent.  Recent and remote memory are intact.  Normal attention and concentration.  Facial movement is symmetric.  Gait is normal.     Review of Data:   Notes from neuro reviewed.   Labs:  Admission on 07/21/2022, Discharged on 07/21/2022   Component Date Value Ref Range Status    aPTT 07/21/2022 30.2  21.0 - 32.0 sec Final    Prothrombin Time 07/21/2022 10.4  9.0 - 12.5 sec Final    INR 07/21/2022 1.0  0.8 - 1.2 Final    POC Preg Test, Ur 07/21/2022 Negative  Negative Final     Acceptable 07/21/2022 Yes   " Final     Imaging:  No results found for this or any previous visit.    Note: I have independently reviewed any/all imaging/labs/tests and agree with the report (s) as documented.  Any discrepancies will be as noted/demarcated by free text.  LARON CORREA 7/27/2022    ASSESSMENT:  1. Intractable migraine without aura and with status migrainosus    2. Other chronic sinusitis        PLAN:  - Discussed symptoms appear to be consistent with migraines and rebound Ha's, discussed treatment options and patient agreed with the following plan:  - ppx - continue botox and emgality  - Botox administered in clinic for Chronic Migraine (see below)   - abortive - continue nurtec  - defers ONB  - chronic sinusitis - recent surgery, possible complications, pending appt w/ ENT on Friday for further eval  - pituitary cyst - continue recs per nsgy including seeing ophtho and repeat MRI brain  - anxiety - defers referral  - track ha's  - RTC in 12 weeks for repeat Botox injections or sooner if needed     Orders Placed This Encounter    onabotulinumtoxina injection 200 Units       All questions and concerns addressed.  Patient verbalizes understanding and is agreeable with the above stated treatment plan.      PROCEDURE NOTE:  BOTOX was performed as an indicated therapy for intractable chronic migraine headaches given that the patient failed more than 2 headache medications    Two patient identifiers were confirmed with the patient prior to performing this procedure. A time out to determine correct patient and and agreement on procedure performed was conducted prior to the procedure.      Botulinum Toxin Injection Procedure   Procedure: Botulinum toxin injection (53449)  After risks and benefits were explained including bleeding, infection, worsening of pain, damage to the areas being injected, weakness of muscles, loss of muscle control, dysphagia if injecting the head or neck, facial droop if injecting the facial area, painful injection,  allergic or other reaction to the medications being injected, and the failure of the procedure to help the problem, a signed consent was obtained.   The patient was placed in a comfortable area and the sites to be treated were identified.The area to be treated was prepped three times with alcohol and the alcohol allowed to dry. Next, a 30 gauge needle was used to inject the medication in the area to be treated.      Total Botox used: 155 Units   Botox wastage: 45 Units     Injection sites:    muscle bilaterally ( a total of 10 units divided into 2 sites)   Procerus muscle (5 units)   Frontalis muscle bilaterally (a total of 20 units divided into 4 sites)   Temporalis muscle bilaterally (a total of 40 units divided into 8 sites)   Occipitalis muscle bilaterally (a total of 30 units divided into 6 sites)   Cervical paraspinal muscles (a total of 20 units divided into 4 sites)   Trapezius muscle bilaterally (a total of 30 units divided into 6 sites)   Complications: none   RTC for the next Botox injection: 12 weeks     The patient tolerated the procedure well and did not experience any complications.     CC: Christopher Shelby Sarena Patel, PA-C [facility]   [phone]

## 2022-07-29 ENCOUNTER — OFFICE VISIT (OUTPATIENT)
Dept: OTOLARYNGOLOGY | Facility: CLINIC | Age: 42
End: 2022-07-29
Payer: COMMERCIAL

## 2022-07-29 VITALS — DIASTOLIC BLOOD PRESSURE: 76 MMHG | HEART RATE: 76 BPM | SYSTOLIC BLOOD PRESSURE: 112 MMHG

## 2022-07-29 DIAGNOSIS — Z98.890 STATUS POST FUNCTIONAL ENDOSCOPIC SINUS SURGERY (FESS): ICD-10-CM

## 2022-07-29 DIAGNOSIS — J32.9 CHRONIC SINUSITIS, UNSPECIFIED LOCATION: Primary | ICD-10-CM

## 2022-07-29 PROCEDURE — 99999 PR PBB SHADOW E&M-EST. PATIENT-LVL IV: CPT | Mod: PBBFAC,,, | Performed by: STUDENT IN AN ORGANIZED HEALTH CARE EDUCATION/TRAINING PROGRAM

## 2022-07-29 PROCEDURE — 31237 PR NASAL/SINUS ENDOSCOPY,BX/RMV POLYP/DEBRID: ICD-10-PCS | Mod: 50,79,S$GLB, | Performed by: STUDENT IN AN ORGANIZED HEALTH CARE EDUCATION/TRAINING PROGRAM

## 2022-07-29 PROCEDURE — 1160F PR REVIEW ALL MEDS BY PRESCRIBER/CLIN PHARMACIST DOCUMENTED: ICD-10-PCS | Mod: CPTII,S$GLB,, | Performed by: STUDENT IN AN ORGANIZED HEALTH CARE EDUCATION/TRAINING PROGRAM

## 2022-07-29 PROCEDURE — 31237 NSL/SINS NDSC SURG BX POLYPC: CPT | Mod: 50,79,S$GLB, | Performed by: STUDENT IN AN ORGANIZED HEALTH CARE EDUCATION/TRAINING PROGRAM

## 2022-07-29 PROCEDURE — 99999 PR PBB SHADOW E&M-EST. PATIENT-LVL IV: ICD-10-PCS | Mod: PBBFAC,,, | Performed by: STUDENT IN AN ORGANIZED HEALTH CARE EDUCATION/TRAINING PROGRAM

## 2022-07-29 PROCEDURE — 1159F PR MEDICATION LIST DOCUMENTED IN MEDICAL RECORD: ICD-10-PCS | Mod: CPTII,S$GLB,, | Performed by: STUDENT IN AN ORGANIZED HEALTH CARE EDUCATION/TRAINING PROGRAM

## 2022-07-29 PROCEDURE — 3078F PR MOST RECENT DIASTOLIC BLOOD PRESSURE < 80 MM HG: ICD-10-PCS | Mod: CPTII,S$GLB,, | Performed by: STUDENT IN AN ORGANIZED HEALTH CARE EDUCATION/TRAINING PROGRAM

## 2022-07-29 PROCEDURE — 3074F SYST BP LT 130 MM HG: CPT | Mod: CPTII,S$GLB,, | Performed by: STUDENT IN AN ORGANIZED HEALTH CARE EDUCATION/TRAINING PROGRAM

## 2022-07-29 PROCEDURE — 3074F PR MOST RECENT SYSTOLIC BLOOD PRESSURE < 130 MM HG: ICD-10-PCS | Mod: CPTII,S$GLB,, | Performed by: STUDENT IN AN ORGANIZED HEALTH CARE EDUCATION/TRAINING PROGRAM

## 2022-07-29 PROCEDURE — 3078F DIAST BP <80 MM HG: CPT | Mod: CPTII,S$GLB,, | Performed by: STUDENT IN AN ORGANIZED HEALTH CARE EDUCATION/TRAINING PROGRAM

## 2022-07-29 PROCEDURE — 99024 PR POST-OP FOLLOW-UP VISIT: ICD-10-PCS | Mod: S$GLB,,, | Performed by: STUDENT IN AN ORGANIZED HEALTH CARE EDUCATION/TRAINING PROGRAM

## 2022-07-29 PROCEDURE — 1160F RVW MEDS BY RX/DR IN RCRD: CPT | Mod: CPTII,S$GLB,, | Performed by: STUDENT IN AN ORGANIZED HEALTH CARE EDUCATION/TRAINING PROGRAM

## 2022-07-29 PROCEDURE — 1159F MED LIST DOCD IN RCRD: CPT | Mod: CPTII,S$GLB,, | Performed by: STUDENT IN AN ORGANIZED HEALTH CARE EDUCATION/TRAINING PROGRAM

## 2022-07-29 PROCEDURE — 99024 POSTOP FOLLOW-UP VISIT: CPT | Mod: S$GLB,,, | Performed by: STUDENT IN AN ORGANIZED HEALTH CARE EDUCATION/TRAINING PROGRAM

## 2022-07-29 NOTE — PROGRESS NOTES
POSTOP STESS 1    7/29/2022     I had the pleasure of seeing Wanda Singleton in follow up today.  She recently underwent STESS on 7/21/22.  The patient complains of feeling much better after the surgery, but over the last few days has noticed some pressure mostly on the left side.     On physical exam, nasal endoscopy was performed and debris was removed from the bilateral sinus cavities.  The underlying sinuses appeared to be healing well. Nasopore suctioned from the ethmoid cavities bilaterally. No scarring.     Impression today is consistent with doing well status post STESS.  I have recommended that they begin on Sinus Rinse twice daily and return to see us in 3 weeks time.    Thank you for allowing me to participate in her care.  I will keep you abreast of her progress.  Please do not hesitate to contact me with any questions.     Sincerely,    Delvis Galloway MD    Nasal Endoscopy with Debridement     The nasal passageway was anesthetized with topical Lidocaine 4% and decongested with phenylephrine nasal spray. A rigid nasal endoscope was inserted into the nose to visualize the nasal passageway and paranasal sinuses. Under endoscopic visualization, a combination of instruments including suction and grasping forceps were used to debride crust, debris, inflammatory tissue and nasal polyps from the nasal cavity, paranasal sinuses and sinus drainage pathways. This was performed to aid in healing and optimize the patency and function of the sinus cavities and nasal passageways. This is necessary to avoid scar formation, infection, and mucocele formation. In addition, this facilitates in the optimal instillation of topical therapies, saline irrigations, long-term disease surveillance, and endoscopically-derived cultures. The endoscope was withdrawn without sequelae. The procedure was well tolerated by the patient.    Answers for HPI/ROS submitted by the patient on 7/29/2022  Fatigue (Tiredness)?: Yes  sinus  pressure : Yes  None of these : Yes  None of these: Yes  chest pain: Yes  None of these: Yes  None of these: Yes  back pain: Yes  None of these : Yes  Seasonal Allergies?: Yes  Cold all of the time? : Yes  dizziness: Yes  headaches: Yes  Light-headedness: Yes  None of these: Yes  None of these: Yes

## 2022-08-02 LAB
FINAL PATHOLOGIC DIAGNOSIS: NORMAL
FINAL PATHOLOGIC DIAGNOSIS: NORMAL
GROSS: NORMAL
GROSS: NORMAL
Lab: NORMAL
Lab: NORMAL
MICROSCOPIC EXAM: NORMAL
MICROSCOPIC EXAM: NORMAL

## 2022-08-05 ENCOUNTER — PATIENT MESSAGE (OUTPATIENT)
Dept: OTOLARYNGOLOGY | Facility: CLINIC | Age: 42
End: 2022-08-05
Payer: COMMERCIAL

## 2022-08-19 ENCOUNTER — OFFICE VISIT (OUTPATIENT)
Dept: OTOLARYNGOLOGY | Facility: CLINIC | Age: 42
End: 2022-08-19
Payer: COMMERCIAL

## 2022-08-19 VITALS
BODY MASS INDEX: 21.26 KG/M2 | SYSTOLIC BLOOD PRESSURE: 131 MMHG | WEIGHT: 120 LBS | DIASTOLIC BLOOD PRESSURE: 84 MMHG | HEART RATE: 92 BPM

## 2022-08-19 DIAGNOSIS — J32.9 CHRONIC SINUSITIS, UNSPECIFIED LOCATION: Primary | ICD-10-CM

## 2022-08-19 DIAGNOSIS — Z98.890 STATUS POST FUNCTIONAL ENDOSCOPIC SINUS SURGERY (FESS): ICD-10-CM

## 2022-08-19 PROCEDURE — 3075F SYST BP GE 130 - 139MM HG: CPT | Mod: CPTII,S$GLB,, | Performed by: STUDENT IN AN ORGANIZED HEALTH CARE EDUCATION/TRAINING PROGRAM

## 2022-08-19 PROCEDURE — 1159F MED LIST DOCD IN RCRD: CPT | Mod: CPTII,S$GLB,, | Performed by: STUDENT IN AN ORGANIZED HEALTH CARE EDUCATION/TRAINING PROGRAM

## 2022-08-19 PROCEDURE — 3079F PR MOST RECENT DIASTOLIC BLOOD PRESSURE 80-89 MM HG: ICD-10-PCS | Mod: CPTII,S$GLB,, | Performed by: STUDENT IN AN ORGANIZED HEALTH CARE EDUCATION/TRAINING PROGRAM

## 2022-08-19 PROCEDURE — 31237 PR NASAL/SINUS ENDOSCOPY,BX/RMV POLYP/DEBRID: ICD-10-PCS | Mod: 50,79,S$GLB, | Performed by: STUDENT IN AN ORGANIZED HEALTH CARE EDUCATION/TRAINING PROGRAM

## 2022-08-19 PROCEDURE — 1159F PR MEDICATION LIST DOCUMENTED IN MEDICAL RECORD: ICD-10-PCS | Mod: CPTII,S$GLB,, | Performed by: STUDENT IN AN ORGANIZED HEALTH CARE EDUCATION/TRAINING PROGRAM

## 2022-08-19 PROCEDURE — 99999 PR PBB SHADOW E&M-EST. PATIENT-LVL III: ICD-10-PCS | Mod: PBBFAC,,, | Performed by: STUDENT IN AN ORGANIZED HEALTH CARE EDUCATION/TRAINING PROGRAM

## 2022-08-19 PROCEDURE — 99024 POSTOP FOLLOW-UP VISIT: CPT | Mod: S$GLB,,, | Performed by: STUDENT IN AN ORGANIZED HEALTH CARE EDUCATION/TRAINING PROGRAM

## 2022-08-19 PROCEDURE — 99999 PR PBB SHADOW E&M-EST. PATIENT-LVL III: CPT | Mod: PBBFAC,,, | Performed by: STUDENT IN AN ORGANIZED HEALTH CARE EDUCATION/TRAINING PROGRAM

## 2022-08-19 PROCEDURE — 3008F BODY MASS INDEX DOCD: CPT | Mod: CPTII,S$GLB,, | Performed by: STUDENT IN AN ORGANIZED HEALTH CARE EDUCATION/TRAINING PROGRAM

## 2022-08-19 PROCEDURE — 99024 PR POST-OP FOLLOW-UP VISIT: ICD-10-PCS | Mod: S$GLB,,, | Performed by: STUDENT IN AN ORGANIZED HEALTH CARE EDUCATION/TRAINING PROGRAM

## 2022-08-19 PROCEDURE — 3075F PR MOST RECENT SYSTOLIC BLOOD PRESS GE 130-139MM HG: ICD-10-PCS | Mod: CPTII,S$GLB,, | Performed by: STUDENT IN AN ORGANIZED HEALTH CARE EDUCATION/TRAINING PROGRAM

## 2022-08-19 PROCEDURE — 31237 NSL/SINS NDSC SURG BX POLYPC: CPT | Mod: 50,79,S$GLB, | Performed by: STUDENT IN AN ORGANIZED HEALTH CARE EDUCATION/TRAINING PROGRAM

## 2022-08-19 PROCEDURE — 3079F DIAST BP 80-89 MM HG: CPT | Mod: CPTII,S$GLB,, | Performed by: STUDENT IN AN ORGANIZED HEALTH CARE EDUCATION/TRAINING PROGRAM

## 2022-08-19 PROCEDURE — 3008F PR BODY MASS INDEX (BMI) DOCUMENTED: ICD-10-PCS | Mod: CPTII,S$GLB,, | Performed by: STUDENT IN AN ORGANIZED HEALTH CARE EDUCATION/TRAINING PROGRAM

## 2022-08-19 RX ORDER — DOXYCYCLINE HYCLATE 100 MG
100 TABLET ORAL 2 TIMES DAILY
Qty: 20 TABLET | Refills: 0 | Status: SHIPPED | OUTPATIENT
Start: 2022-08-19 | End: 2022-08-29

## 2022-08-22 NOTE — PROGRESS NOTES
POSTOP STESS 2    8/19/2022     I had the pleasure of seeing Wanda Singleton in follow up today.  She recently underwent STESS on 7/21/22.  The patient reports some bilateral nasal congestion. Has some left sided facial pressure. Has been using nasal saline sprays, not irrigations.     On physical exam, nasal endoscopy was performed and debris was removed from the bilateral sinus cavities.  The underlying sinuses appeared to be healing well. Nasopore suctioned from the ethmoid cavities bilaterally. No scarring.      Impression today is consistent with doing well status post STESS.  I have recommended that they begin on Sinus Rinse twice daily and return to see us in 3 weeks time.    Thank you for allowing me to participate in her care.  I will keep you abreast of her progress.  Please do not hesitate to contact me with any questions.     Sincerely,    Delvis Galloway MD    Nasal Endoscopy with Debridement     The nasal passageway was anesthetized with topical Lidocaine 4% and decongested with phenylephrine nasal spray. A rigid nasal endoscope was inserted into the nose to visualize the nasal passageway and paranasal sinuses. Under endoscopic visualization, a combination of instruments including suction and grasping forceps were used to debride crust, debris, inflammatory tissue and nasal polyps from the nasal cavity, paranasal sinuses and sinus drainage pathways. This was performed to aid in healing and optimize the patency and function of the sinus cavities and nasal passageways. This is necessary to avoid scar formation, infection, and mucocele formation. In addition, this facilitates in the optimal instillation of topical therapies, saline irrigations, long-term disease surveillance, and endoscopically-derived cultures. The endoscope was withdrawn without sequelae. The procedure was well tolerated by the patient.    Answers for HPI/ROS submitted by the patient on 8/19/2022  Fatigue (Tiredness)?: Yes  ear  pain: Yes  sinus pressure : Yes  Light sensitivity / Light hurts the eyes?: Yes  cough: Yes  None of these : Yes  None of these: Yes  None of these: Yes  back pain: Yes  neck pain: Yes  None of these : Yes  Food Allergies?: Yes  Seasonal Allergies?: Yes  Cold all of the time? : Yes  dizziness: Yes  headaches: Yes  Light-headedness: Yes  None of these: Yes  nervous/ anxious: Yes

## 2022-10-07 ENCOUNTER — OFFICE VISIT (OUTPATIENT)
Dept: OBSTETRICS AND GYNECOLOGY | Facility: CLINIC | Age: 42
End: 2022-10-07
Payer: COMMERCIAL

## 2022-10-07 ENCOUNTER — LAB VISIT (OUTPATIENT)
Dept: LAB | Facility: OTHER | Age: 42
End: 2022-10-07
Attending: OBSTETRICS & GYNECOLOGY
Payer: COMMERCIAL

## 2022-10-07 DIAGNOSIS — Z12.31 ENCOUNTER FOR SCREENING MAMMOGRAM FOR MALIGNANT NEOPLASM OF BREAST: ICD-10-CM

## 2022-10-07 DIAGNOSIS — Z01.419 ENCOUNTER FOR WELL WOMAN EXAM WITH ROUTINE GYNECOLOGICAL EXAM: Primary | ICD-10-CM

## 2022-10-07 DIAGNOSIS — N80.9 ENDOMETRIOSIS DETERMINED BY LAPAROSCOPY: ICD-10-CM

## 2022-10-07 DIAGNOSIS — N80.9 ENDOMETRIOSIS: ICD-10-CM

## 2022-10-07 DIAGNOSIS — Z12.4 CERVICAL CANCER SCREENING: ICD-10-CM

## 2022-10-07 LAB
BASOPHILS # BLD AUTO: 0.04 K/UL (ref 0–0.2)
BASOPHILS NFR BLD: 0.6 % (ref 0–1.9)
DIFFERENTIAL METHOD: NORMAL
EOSINOPHIL # BLD AUTO: 0 K/UL (ref 0–0.5)
EOSINOPHIL NFR BLD: 0.6 % (ref 0–8)
ERYTHROCYTE [DISTWIDTH] IN BLOOD BY AUTOMATED COUNT: 12.6 % (ref 11.5–14.5)
FSH SERPL-ACNC: 7.43 MIU/ML
HCT VFR BLD AUTO: 39.6 % (ref 37–48.5)
HGB BLD-MCNC: 13 G/DL (ref 12–16)
IMM GRANULOCYTES # BLD AUTO: 0.01 K/UL (ref 0–0.04)
IMM GRANULOCYTES NFR BLD AUTO: 0.1 % (ref 0–0.5)
LYMPHOCYTES # BLD AUTO: 2.3 K/UL (ref 1–4.8)
LYMPHOCYTES NFR BLD: 34.4 % (ref 18–48)
MCH RBC QN AUTO: 30.8 PG (ref 27–31)
MCHC RBC AUTO-ENTMCNC: 32.8 G/DL (ref 32–36)
MCV RBC AUTO: 94 FL (ref 82–98)
MONOCYTES # BLD AUTO: 0.5 K/UL (ref 0.3–1)
MONOCYTES NFR BLD: 6.8 % (ref 4–15)
NEUTROPHILS # BLD AUTO: 3.9 K/UL (ref 1.8–7.7)
NEUTROPHILS NFR BLD: 57.5 % (ref 38–73)
NRBC BLD-RTO: 0 /100 WBC
PLATELET # BLD AUTO: 235 K/UL (ref 150–450)
PMV BLD AUTO: 12.7 FL (ref 9.2–12.9)
RBC # BLD AUTO: 4.22 M/UL (ref 4–5.4)
T4 FREE SERPL-MCNC: 0.94 NG/DL (ref 0.71–1.51)
TSH SERPL DL<=0.005 MIU/L-ACNC: 0.83 UIU/ML (ref 0.4–4)
WBC # BLD AUTO: 6.8 K/UL (ref 3.9–12.7)

## 2022-10-07 PROCEDURE — 36415 COLL VENOUS BLD VENIPUNCTURE: CPT | Performed by: OBSTETRICS & GYNECOLOGY

## 2022-10-07 PROCEDURE — 99999 PR PBB SHADOW E&M-EST. PATIENT-LVL III: ICD-10-PCS | Mod: PBBFAC,,, | Performed by: OBSTETRICS & GYNECOLOGY

## 2022-10-07 PROCEDURE — 88141 CYTOPATH C/V INTERPRET: CPT | Mod: ,,, | Performed by: PATHOLOGY

## 2022-10-07 PROCEDURE — 99386 PR PREVENTIVE VISIT,NEW,40-64: ICD-10-PCS | Mod: S$GLB,,, | Performed by: OBSTETRICS & GYNECOLOGY

## 2022-10-07 PROCEDURE — 99386 PREV VISIT NEW AGE 40-64: CPT | Mod: S$GLB,,, | Performed by: OBSTETRICS & GYNECOLOGY

## 2022-10-07 PROCEDURE — 84443 ASSAY THYROID STIM HORMONE: CPT | Performed by: OBSTETRICS & GYNECOLOGY

## 2022-10-07 PROCEDURE — 85025 COMPLETE CBC W/AUTO DIFF WBC: CPT | Performed by: OBSTETRICS & GYNECOLOGY

## 2022-10-07 PROCEDURE — 99999 PR PBB SHADOW E&M-EST. PATIENT-LVL III: CPT | Mod: PBBFAC,,, | Performed by: OBSTETRICS & GYNECOLOGY

## 2022-10-07 PROCEDURE — 83001 ASSAY OF GONADOTROPIN (FSH): CPT | Performed by: OBSTETRICS & GYNECOLOGY

## 2022-10-07 PROCEDURE — 1160F PR REVIEW ALL MEDS BY PRESCRIBER/CLIN PHARMACIST DOCUMENTED: ICD-10-PCS | Mod: CPTII,S$GLB,, | Performed by: OBSTETRICS & GYNECOLOGY

## 2022-10-07 PROCEDURE — 84439 ASSAY OF FREE THYROXINE: CPT | Performed by: OBSTETRICS & GYNECOLOGY

## 2022-10-07 PROCEDURE — 1160F RVW MEDS BY RX/DR IN RCRD: CPT | Mod: CPTII,S$GLB,, | Performed by: OBSTETRICS & GYNECOLOGY

## 2022-10-07 PROCEDURE — 88175 CYTOPATH C/V AUTO FLUID REDO: CPT | Performed by: PATHOLOGY

## 2022-10-07 PROCEDURE — 1159F MED LIST DOCD IN RCRD: CPT | Mod: CPTII,S$GLB,, | Performed by: OBSTETRICS & GYNECOLOGY

## 2022-10-07 PROCEDURE — 88141 PR  CYTOPATH CERV/VAG INTERPRET: ICD-10-PCS | Mod: ,,, | Performed by: PATHOLOGY

## 2022-10-07 PROCEDURE — 1159F PR MEDICATION LIST DOCUMENTED IN MEDICAL RECORD: ICD-10-PCS | Mod: CPTII,S$GLB,, | Performed by: OBSTETRICS & GYNECOLOGY

## 2022-10-07 PROCEDURE — 87624 HPV HI-RISK TYP POOLED RSLT: CPT | Performed by: OBSTETRICS & GYNECOLOGY

## 2022-10-07 PROCEDURE — 82672 ASSAY OF ESTROGEN: CPT | Performed by: OBSTETRICS & GYNECOLOGY

## 2022-10-07 RX ORDER — IRON 18 MG
1 TABLET ORAL DAILY
COMMUNITY
End: 2023-10-17

## 2022-10-07 RX ORDER — NORETHINDRONE 5 MG/1
5 TABLET ORAL DAILY
Qty: 30 TABLET | Refills: 11 | Status: SHIPPED | OUTPATIENT
Start: 2022-10-07 | End: 2023-10-07

## 2022-10-07 RX ORDER — NEOMYCIN/BACITRACIN/POLYMYXINB 3.5-400-5K
OINTMENT (GRAM) TOPICAL
COMMUNITY
End: 2023-10-17

## 2022-10-07 NOTE — PROGRESS NOTES
Chief Complaint: Annual exam/New Patient    Chief Complaint   Patient presents with    Well Woman       HPI:   42 y.o. G0 here today as a new patient to establish care and for annual exam. PMH includes endometriosis (see below), chronic sinus issues, and weight loss. Patient denies abnormal breast symptoms, denies FH of breast, uterine, ovarian, or colon cancer. Patient denies vaginal discharge, itching, irritation, odor, urinary complaints, or change in bowel habits. She is currently sexually active. Currently using nothing for birth control. She has been vaccinated against COVID-19.    History of endometriosis, s/p LSC surgeries at outside hospital, last one 8-9 years ago. She has had irregular vaginal bleeding worsening in the past two years with debilitating cramps around her menstrual cycles as well as intermenstrual bleeding/spotting. Her periods are heavy, lasting 7 days, used to use super tampons, now using regular pads and tampons (but together because sometimes she bleeds through tampons). Cramping starts 24 hours before her period. NSAIDs and other over the counter medications don't help. She has pain with intercourse, some positions worse than others.     She has tried OCPs, Mirena IUD, and Depo in the past with no improvement in symptoms. She has had a 30 lbs weight loss in the past 1-2 years. Reports regular bowel movements and constantly feeling cold.     Lives in [place], [place]. Partner present with her today, concerned about her bleeding and pain.     LMP Dates from Last 1 Encounters:   LMP: 06/08/2022     Pap: History of abnormal paps, recently normal    Labs / Significant Studies    Admission on 07/21/2022, Discharged on 07/21/2022   Component Date Value Ref Range Status    aPTT 07/21/2022 30.2  21.0 - 32.0 sec Final    aPTT therapeutic range = 39-69 seconds    Prothrombin Time 07/21/2022 10.4  9.0 - 12.5 sec Final    INR 07/21/2022 1.0  0.8 - 1.2 Final    Comment: Coumadin Therapy:  2.0 - 3.0  for INR for all indicators except mechanical heart valves  and antiphospholipid syndromes which should use 2.5 - 3.5.      POC Preg Test, Ur 07/21/2022 Negative  Negative Final     Acceptable 07/21/2022 Yes   Final    Final Pathologic Diagnosis 07/21/2022    Final                    Value:1. Sinus contents, right maxillary sinus, excision:  - Mild chronic sinusitis  - No eosinophils identified  - No infectious organisms identified on H&E stain  - Fragments of viable lamellar bone  - Negative for malignancy  2. Sinus contents, right ethmoid, excision:  - Mild chronic sinusitis  - No eosinophils identified  - No infectious organisms identified on H&E stain  - Fragments of viable lamellar bone  - Negative for malignancy  3. Sinus contents, right sphenoid, excision:  - Mild chronic sinusitis  - No eosinophils identified  - No infectious organisms identified on H&E stain  - Fragments of viable lamellar bone  - Negative for malignancy  4. Sinus contents, right frontal sinus, excision:  - Chronic sinusitis  - 2 eosinophils identified in 1 high-power field  - No infectious organisms identified on H&E stain  - Fragments of viable lamellar bone  - Negative for malignancy  5. Sinus contents, left maxillary sinus, excision:  - Chronic sinusitis  - No eosinophils identified  - No in                          fectious organisms identified on H&E stain  - Fragments of viable lamellar bone  - Negative for malignancy  6. Sinus contents, left ethmoid, excision:  - Chronic sinusitis  - 1 eosinophil identified in 1 high-power field  - No infectious organisms identified on H&E stain  - Fragments of viable lamellar bone  - Negative for malignancy  7. Sinus contents, left sphenoid, excision:  - Chronic sinusitis  - 1 eosinophil identified in 1 high-power field  - No infectious organisms identified on H&E stain  - Fragments of viable lamellar bone  - Negative for malignancy      Interp By Yury Santamaria MD, PhD, Signed on  "08/02/2022 at 10:42    Microscopic Exam 07/21/2022 Microscopic examination performed.   Final    Gross 07/21/2022    Final                    Value:Patient ID/Pathology ID [id number]  Received in 7 parts  Part 1  Pathology ID:  [id number]  Received fresh and subsequently fixed in formalin, labeled "right maxillary  sinus", is a 1.5 x 0.7 cm aggregate of soft, delicate pink tissue and bone.  Entirely submitted in cassette [id number] following decalcification.  Part 2  Pathology ID:  [id number]  Received fresh and subsequently fixed in formalin, labeled "right ethmoid",  is a 1.0 x 0.7 cm soft, pink hemorrhagic tissue fragment.  Entirely submitted  in cassette [id number]  Part 3  Pathology ID:  [id number]  Received fresh and subsequently fixed in formalin, labeled "right sphenoid",  is a 0.8 x 0.7 cm aggregate of soft, pink hemorrhagic tissue and bone.  Entirely submitted in cassette [id number] following decalcification.  Part 4  Pathology ID:  Is [id number]  Received fresh and subsequently fixed in formalin, labeled "right frontal  sinus", is a 0.7 x 0.7 cm aggregate of soft, pink tissue and bone.  Entirely  submitted in ca                          ssette [id number] following decalcification.  Part 5  Pathology ID:  [id number]  Received fresh and subsequently fixed in formalin, labeled "left maxillary  sinus", is a 0.7 x 0.7 cm aggregate of soft pink hemorrhagic tissue.  Entirely submitted in cassette [id number]  Part 6  Pathology ID:  [id number]  Received fresh and subsequently fixed in formalin, labeled "left ethmoid", is  a 0.8 x 0.8 cm soft, pink tissue fragment.  Entirely submitted in cassette  [id number]  Part 7  Pathology ID:  [id number]  Received fresh and subsequently fixed in formalin, labeled "left sphenoid",  are 2 soft pink tissue fragments that measure in aggregate 0.5 x 0.5 cm.  Entirely submitted in cassette [id number]  Reese LEARY ([facility]) cm      Disclaimer 07/21/2022    " "Final                    Value:Unless the case is a 'gross only' or additional testing only, the final  diagnosis for each specimen is based on a microscopic examination of  appropriate tissue sections.      Final Pathologic Diagnosis 07/21/2022    Final                    Value:Sinus contents, left frontal, excision:  - Chronic sinusitis  - No eosinophils identified  - No infectious organisms identified on H&E stain  - Fragments of viable lamellar bone  - Negative for malignancy      Interp By Yury Santamaria MD, PhD, Signed on 08/02/2022 at 11:02    Microscopic Exam 07/21/2022 Microscopic examination performed.   Final    Gross 07/21/2022    Final                    Value:Patient ID/Pathology ID: [id number]  Received fresh and subsequently fixed in formalin, labeled" L  frontal-permanent" is a 0.9 x 0.8 cm aggregate of soft, pink tissue. Entirely  submitted in cassette [id number]  Reese Naylor  Pathologists' Assistant, [facility]      Disclaimer 07/21/2022    Final                    Value:Unless the case is a 'gross only' or additional testing only, the final  diagnosis for each specimen is based on a microscopic examination of  appropriate tissue sections.            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      Past Surgical History:   Procedure Laterality Date    ETHMOIDECTOMY  7/21/2022    Procedure: ETHMOIDECTOMY;  Surgeon: Delvis Galloway MD;  Location: [facility] OR [address] [place] [address];  Service: ENT;;    FRONTAL SINUS OBLITERATION  7/21/2022    Procedure: SINUSOTOMY, FRONTAL SINUS, OBLITERATIVE;  Surgeon: Delvis Galloway MD;  Location: [facility] OR [address] [place] [address];  Service: ENT;;    FUNCTIONAL ENDOSCOPIC SINUS SURGERY (FESS) USING COMPUTER-ASSISTED NAVIGATION Bilateral 7/21/2022    Procedure: FESS, USING COMPUTER-ASSISTED NAVIGATION;  Surgeon: Delvis Galloway MD;  Location: [facility] OR [address] [place] [address];  Service: ENT;  Laterality: " Bilateral;    LAPAROSCOPY      x2 for endometriosis    LEG SURGERY      x8    MAXILLARY ANTROSTOMY  7/21/2022    Procedure: MAXILLARY ANTROSTOMY;  Surgeon: Delvis Galloway MD;  Location: [facility] OR [address] [place] [address];  Service: ENT;;       Current Outpatient Medications:     albuterol (PROVENTIL/VENTOLIN HFA) 90 mcg/actuation inhaler, Inhale 2 puffs into the lungs every 6 (six) hours as needed., Disp: , Rfl:     DM/pseudoephed/acetaminophen (PSEUDOEPHED-DM-ACETAMINOPHEN ORAL), Take by mouth., Disp: , Rfl:     VITAMIN B COMPLEX ORAL, Take 1 tablet by mouth once daily., Disp: , Rfl:     vitamin D (VITAMIN D3) 1000 units Tab, Take 1,000 Units by mouth once daily., Disp: , Rfl:     acetaminophen (TYLENOL) 325 MG tablet, Take 2 tablets (650 mg total) by mouth every 6 (six) hours as needed for Pain. Alternate with ibuprofen every 3 hours as needed, Disp: 40 tablet, Rfl: 0    ascorbic acid, vitamin C, (VITAMIN C) 500 MG tablet, Take 500 mg by mouth once daily., Disp: , Rfl:     diphenhydrAMINE (BENADRYL) 25 mg capsule, Take 1 capsule by mouth every evening., Disp: , Rfl:     ELDERBERRY FRUIT AND FLOWER ORAL, Take 1 tablet by mouth once daily., Disp: , Rfl:     famotidine (PEPCID) 20 MG tablet, Take 20 mg by mouth 2 (two) times daily., Disp: , Rfl:     galcanezumab-gnlm (EMGALITY PEN) 120 mg/mL PnIj, Inject 120 mg into the skin every 28 days., Disp: 3 each, Rfl: 3    ginkgo biloba 40 mg Tab, Take by mouth., Disp: , Rfl:     ibuprofen (ADVIL,MOTRIN) 800 MG tablet, Take 1 tablet (800 mg total) by mouth every 6 (six) hours as needed for Pain. Alternate with tylenol every 3 hours as needed., Disp: 20 tablet, Rfl: 0    iron 18 mg Tab, Take 1 tablet by mouth once daily., Disp: , Rfl:     lysine (L-LYSINE) 500 mg Tab, Take 1,000 mg by mouth once daily., Disp: , Rfl:     melatonin 5 mg Chew, Take 1 tablet by mouth every evening., Disp: , Rfl:     melatonin-pyridoxal phos, B6, 2.5 mg- 338 mcg Subl, Place under the tongue., Disp: , Rfl:      norethindrone (AYGESTIN) 5 mg Tab, Take 1 tablet (5 mg total) by mouth once daily., Disp: 30 tablet, Rfl: 11    NURTEC 75 mg odt, PLEASE SEE ATTACHED FOR DETAILED DIRECTIONS, Disp: , Rfl:     omega-3 fatty acids-fish oil (FISH OIL) 300-500 mg Cap, Take 1 capsule by mouth once daily., Disp: , Rfl:     ondansetron (ZOFRAN-ODT) 8 MG TbDL, Dissolve 1 tablet (8 mg total) by mouth every 8 (eight) hours as needed (nausea)., Disp: 15 tablet, Rfl: 0    prenat.vits,zeenat,min-iron-folic (PRENATAL VITAMIN) Tab, Take 1 tablet by mouth once daily., Disp: , Rfl:   No current facility-administered medications for this visit.    Facility-Administered Medications Ordered in Other Visits:     haloperidol lactate injection 0.5 mg, 0.5 mg, Intravenous, Q10 Min PRN, Popeye Phelps MD  Review of patient's allergies indicates:   Allergen Reactions    Adhesive Blisters, Hives, Itching, Rash and Swelling    Bee sting kit Hives, Itching, Nausea Only and Swelling    Fluticasone Anxiety, Other (See Comments), Palpitations and Shortness Of Breath    Guaifenesin Anxiety, Hallucinations, Palpitations and Shortness Of Breath    Horseradish root extract Anaphylaxis, Hives, Nausea And Vomiting, Palpitations, Shortness Of Breath and Swelling    Bleach (sodium hypochlorite)     Horseradish     Levofloxacin     Oxycodone Hives and Itching    Latex, natural rubber Hives, Itching and Rash    Penicillins Hives and Nausea And Vomiting    Sulfa (sulfonamide antibiotics) Anxiety, Diarrhea, Hives, Itching, Nausea And Vomiting and Palpitations     OB History   No obstetric history on file.     Social History     Tobacco Use    Smoking status: Every Day     Packs/day: 0.50     Types: Cigarettes    Smokeless tobacco: Never   Substance Use Topics    Alcohol use: Not Currently    Drug use: Not Currently     Family History   Problem Relation Age of Onset    Breast cancer Neg Hx     Colon cancer Neg Hx     Ovarian cancer Neg Hx        Review of Systems   Negative except  as in HPI     Physical Exam   There were no vitals filed for this visit.  There is no height or weight on file to calculate BMI.    Physical Exam  Constitutional:       General: She is not in acute distress.     Appearance: Normal appearance.   Genitourinary:      Vulva, bladder and urethral meatus normal.      Vaginal bleeding (scant amount of brown blood in vagina) present.      No vaginal discharge.      No vaginal prolapse present.     No vaginal atrophy present.       Right Adnexa: not tender, not full and no mass present.     Left Adnexa: not tender, not full and no mass present.     No cervical motion tenderness or lesion.      Uterus is not tender.      No uterine mass detected.     Uterus is midaxial.      Bladder is not tender.       Pelvic exam was performed with patient in the lithotomy position.   Breasts:     Right: Normal. No mass, nipple discharge or tenderness.      Left: Normal. No mass, nipple discharge or tenderness.   HENT:      Head: Normocephalic and atraumatic.   Pulmonary:      Effort: Pulmonary effort is normal.   Abdominal:      General: Bowel sounds are normal. There is no distension.      Palpations: Abdomen is soft. There is no mass.      Tenderness: There is no abdominal tenderness. There is no guarding.   Musculoskeletal:         General: Normal range of motion.      Cervical back: Normal range of motion.   Lymphadenopathy:      Upper Body:      Right upper body: No supraclavicular, axillary or pectoral adenopathy.      Left upper body: No supraclavicular, axillary or pectoral adenopathy.   Neurological:      General: No focal deficit present.      Mental Status: She is alert and oriented to person, place, and time.   Skin:     General: Skin is warm and dry.   Psychiatric:         Mood and Affect: Mood normal.         Behavior: Behavior normal.         Thought Content: Thought content normal.         Judgment: Judgment normal.   Exam conducted with a chaperone present.     "    ASSESSMENT:   Annual Well Women Exam  Patient Active Problem List   Diagnosis    Pituitary cyst    Headache    Visual changes    Intractable migraine without aura and with status migrainosus    Sinus pressure    Chronic sinusitis    Encounter for well woman exam with routine gynecological exam    Endometriosis determined by laparoscopy     Health Maintenance Due   Topic Date Due    Hepatitis C Screening  Never done    Cervical Cancer Screening  Never done    Lipid Panel  Never done    Pneumococcal Vaccines (Age 0-64) (1 - PCV) Never done    HIV Screening  Never done    TETANUS VACCINE  Never done    Mammogram  Never done    COVID-19 Vaccine (4 - Booster for Pfizer series) 01/26/2022    Influenza Vaccine (1) Never done     The patient has no Health Maintenance topics of status Not Due      PLAN:  Problem List Items Addressed This Visit          Renal/    Encounter for well woman exam with routine gynecological exam - Primary    Current Assessment & Plan     Normal breast and pelvic exams except as noted in documentation. Pap/cotesting collected. MMG ordered today, will schedule. Continue breast self awareness, recommend 30 minutes of exercise 5 times weekly. RTC 1 year for annual exam, sooner PRN. Follow up with PCP for routine health maintenance.            Endometriosis determined by laparoscopy    Current Assessment & Plan     Patient reports laparoscopic-proven endometriosis. Was told in the past to be "sterilized or see a fertility specialist." She has no interest in hysterectomy but has significant irregular bleeding/spotting and debilitating pain around the time of her cycles, along with dyspareunia. She is present with her partner today. Will obtain lab work and US to assess anemia/hormone imbalance and presence of ovarian cysts (patient reports having cysts removed on her ovaries during her past surgeries, last was 8-9 years ago). Will start Aygestin 5 mg daily for endometriosis treatment. RTC 2-3 " months for virtual visit, sooner PRN.          Relevant Medications    norethindrone (AYGESTIN) 5 mg Tab     Other Visit Diagnoses       Cervical cancer screening        Relevant Orders    Liquid-Based Pap Smear, Screening    HPV High Risk Genotypes, PCR    Encounter for screening mammogram for malignant neoplasm of breast        Relevant Orders    Mammo Digital Screening Bilat w/ Saurabh              Follow up in about 3 months (around 1/7/2023) for Medication Follow-up.       Yenny Dang MD  Department of Obstetrics & Gynecology  [facility]

## 2022-10-10 ENCOUNTER — PATIENT MESSAGE (OUTPATIENT)
Dept: OBSTETRICS AND GYNECOLOGY | Facility: CLINIC | Age: 42
End: 2022-10-10
Payer: COMMERCIAL

## 2022-10-10 LAB — ESTROGEN SERPL-MCNC: 229 PG/ML

## 2022-10-17 LAB
HPV HR 12 DNA SPEC QL NAA+PROBE: NEGATIVE
HPV16 AG SPEC QL: NEGATIVE
HPV18 DNA SPEC QL NAA+PROBE: NEGATIVE

## 2022-10-18 ENCOUNTER — OFFICE VISIT (OUTPATIENT)
Dept: OTOLARYNGOLOGY | Facility: CLINIC | Age: 42
End: 2022-10-18
Payer: COMMERCIAL

## 2022-10-18 VITALS
HEART RATE: 76 BPM | WEIGHT: 119.5 LBS | BODY MASS INDEX: 21.17 KG/M2 | SYSTOLIC BLOOD PRESSURE: 118 MMHG | TEMPERATURE: 98 F | DIASTOLIC BLOOD PRESSURE: 79 MMHG

## 2022-10-18 DIAGNOSIS — J30.9 ALLERGIC RHINITIS, UNSPECIFIED SEASONALITY, UNSPECIFIED TRIGGER: ICD-10-CM

## 2022-10-18 DIAGNOSIS — J32.9 CHRONIC SINUSITIS, UNSPECIFIED LOCATION: Primary | ICD-10-CM

## 2022-10-18 LAB
FINAL PATHOLOGIC DIAGNOSIS: NORMAL
Lab: NORMAL

## 2022-10-18 PROCEDURE — 31231 NASAL ENDOSCOPY DX: CPT | Mod: 79,S$GLB,, | Performed by: STUDENT IN AN ORGANIZED HEALTH CARE EDUCATION/TRAINING PROGRAM

## 2022-10-18 PROCEDURE — 99213 OFFICE O/P EST LOW 20 MIN: CPT | Mod: 25,24,S$GLB, | Performed by: STUDENT IN AN ORGANIZED HEALTH CARE EDUCATION/TRAINING PROGRAM

## 2022-10-18 PROCEDURE — 1160F PR REVIEW ALL MEDS BY PRESCRIBER/CLIN PHARMACIST DOCUMENTED: ICD-10-PCS | Mod: CPTII,S$GLB,, | Performed by: STUDENT IN AN ORGANIZED HEALTH CARE EDUCATION/TRAINING PROGRAM

## 2022-10-18 PROCEDURE — 99999 PR PBB SHADOW E&M-EST. PATIENT-LVL V: CPT | Mod: PBBFAC,,, | Performed by: STUDENT IN AN ORGANIZED HEALTH CARE EDUCATION/TRAINING PROGRAM

## 2022-10-18 PROCEDURE — 1159F PR MEDICATION LIST DOCUMENTED IN MEDICAL RECORD: ICD-10-PCS | Mod: CPTII,S$GLB,, | Performed by: STUDENT IN AN ORGANIZED HEALTH CARE EDUCATION/TRAINING PROGRAM

## 2022-10-18 PROCEDURE — 31231 PR NASAL ENDOSCOPY, DX: ICD-10-PCS | Mod: 79,S$GLB,, | Performed by: STUDENT IN AN ORGANIZED HEALTH CARE EDUCATION/TRAINING PROGRAM

## 2022-10-18 PROCEDURE — 3074F PR MOST RECENT SYSTOLIC BLOOD PRESSURE < 130 MM HG: ICD-10-PCS | Mod: CPTII,S$GLB,, | Performed by: STUDENT IN AN ORGANIZED HEALTH CARE EDUCATION/TRAINING PROGRAM

## 2022-10-18 PROCEDURE — 3074F SYST BP LT 130 MM HG: CPT | Mod: CPTII,S$GLB,, | Performed by: STUDENT IN AN ORGANIZED HEALTH CARE EDUCATION/TRAINING PROGRAM

## 2022-10-18 PROCEDURE — 99213 PR OFFICE/OUTPT VISIT, EST, LEVL III, 20-29 MIN: ICD-10-PCS | Mod: 25,24,S$GLB, | Performed by: STUDENT IN AN ORGANIZED HEALTH CARE EDUCATION/TRAINING PROGRAM

## 2022-10-18 PROCEDURE — 1160F RVW MEDS BY RX/DR IN RCRD: CPT | Mod: CPTII,S$GLB,, | Performed by: STUDENT IN AN ORGANIZED HEALTH CARE EDUCATION/TRAINING PROGRAM

## 2022-10-18 PROCEDURE — 99999 PR PBB SHADOW E&M-EST. PATIENT-LVL V: ICD-10-PCS | Mod: PBBFAC,,, | Performed by: STUDENT IN AN ORGANIZED HEALTH CARE EDUCATION/TRAINING PROGRAM

## 2022-10-18 PROCEDURE — 1159F MED LIST DOCD IN RCRD: CPT | Mod: CPTII,S$GLB,, | Performed by: STUDENT IN AN ORGANIZED HEALTH CARE EDUCATION/TRAINING PROGRAM

## 2022-10-18 PROCEDURE — 3078F PR MOST RECENT DIASTOLIC BLOOD PRESSURE < 80 MM HG: ICD-10-PCS | Mod: CPTII,S$GLB,, | Performed by: STUDENT IN AN ORGANIZED HEALTH CARE EDUCATION/TRAINING PROGRAM

## 2022-10-18 PROCEDURE — 3078F DIAST BP <80 MM HG: CPT | Mod: CPTII,S$GLB,, | Performed by: STUDENT IN AN ORGANIZED HEALTH CARE EDUCATION/TRAINING PROGRAM

## 2022-10-18 RX ORDER — AZELASTINE 1 MG/ML
1 SPRAY, METERED NASAL 2 TIMES DAILY
Qty: 30 ML | Refills: 3 | Status: SHIPPED | OUTPATIENT
Start: 2022-10-18 | End: 2023-10-18

## 2022-10-18 NOTE — PROGRESS NOTES
Subjective:      Wanda is a 42 y.o. female who comes for follow-up of sinusitis.  Her last visit with me was on 8/19/2022.  She recently underwent STESS on 7/21/22.     Still having some sinus pressure. Able to read again, overall feels improved after surgery, still to able to fully function. Possibly related to significant work stress. Applying for different job. Gets relief with pseudoephedrine and benadryl at night.     Her current sinus regime consists of: Saline spray.     The assessment of quality and severity of symptoms as measured by the SNOT-22 score is 65.    The patient's medications, allergies, past medical, surgical, social and family histories were reviewed and updated as appropriate.    Review of Systems   Constitutional:  Positive for malaise/fatigue.   HENT:  Positive for ear pain.    Eyes: Negative.    Respiratory: Negative.     Cardiovascular: Negative.    Gastrointestinal: Negative.    Genitourinary: Negative.    Musculoskeletal:  Positive for back pain.   Skin: Negative.    Neurological:  Positive for dizziness and headaches.   Psychiatric/Behavioral: Negative.        Answers submitted by the patient for this visit:  Review of Symptoms Questionnaire  (Submitted on 10/17/2022)  sinus pressure : Yes  Seasonal Allergies?: Yes  Cold all of the time? : Yes  Light-headedness: Yes  None of these: Yes    A detailed review of systems was obtained with pertinent positives as per the above HPI, and otherwise negative.        Objective:     /79   Pulse 76   Temp 98.2 °F (36.8 °C)   Wt 54.2 kg (119 lb 7.8 oz)   BMI 21.17 kg/m²        Constitutional:   Vital signs are normal. She appears well-developed. Normal speech.      Head:  Normocephalic and atraumatic.     Ears:    Right Ear: No drainage or tenderness. No middle ear effusion.   Left Ear: No drainage or tenderness.  No middle ear effusion.     Mouth/Throat  Oropharynx clear and moist without lesions or asymmetry and normal uvula  midline. No trismus. No oropharyngeal exudate. Mirror exam not performed due to patient tolerance.  Mirror exam not performed due to patient tolerance.      Neck:  Neck normal without thyromegaly masses, asymmetry, normal tracheal structure, crepitus, and tenderness, thyroid normal and trachea normal.     Pulmonary/Chest:   Effort normal.     Psychiatric:   She has a normal mood and affect. Her speech is normal.     Skin:   No abrasions, lacerations, lesions, or rashes.     Procedure    Nasal endoscopy performed.  See procedure note.    Nasal Endoscopy:  10/18/2022    The use of diagnostic nasal endoscopy was considered medically necessary for the evaluation and visualization of the nasal anatomy for symptoms suggestive of nasal or sinus origin. Physical examination (including a nasal speculum evaluation) did not provide sufficient clinical information to establish a diagnosis, or symptoms did not improve or worsened following treatment.     The nasal cavity was decongested with topical 1% phenylephrine and anesthetized with 4% lidocaine.  A rigid 0-degree endoscope was introduced into the nasal cavity.    The patient was seated in the examination chair. After discussion of risks and benefits, a nasal endoscope was inserted into the nose the endoscope was passed along the left nasal floor to the nasopharynx. It was then passed between the middle and superior meatus, nasal turbinates, nasal septum, nasopharynx and sphenoethmoid region. The nasal endoscope was withdrawn and there was no complications. An identical procedure was performed on the right side. I was present for the entire procedure.The patient tolerated the above procedure well. The findings of this procedure can be found in the dictated note from 10/18/2022 visit.                                    Data Reviewed    WBC (K/uL)   Date Value   10/07/2022 6.80     Eosinophil % (%)   Date Value   10/07/2022 0.6     Eos # (K/uL)   Date Value   10/07/2022 0.0      Platelets (K/uL)   Date Value   10/07/2022 235     Glucose (mg/dL)   Date Value   10/26/2021 87     No results found for: IGE    No sinus imaging available.      Assessment:     1. Chronic sinusitis, unspecified location    2. Allergic rhinitis, unspecified seasonality, unspecified trigger         Plan:     - Astelin  - Cont nasal saline  - RTC 2 months    Delvis Galloway MD

## 2022-10-21 ENCOUNTER — HOSPITAL ENCOUNTER (OUTPATIENT)
Dept: RADIOLOGY | Facility: HOSPITAL | Age: 42
Discharge: HOME | End: 2022-10-21
Attending: OBSTETRICS & GYNECOLOGY
Payer: COMMERCIAL

## 2022-10-21 DIAGNOSIS — Z12.31 ENCOUNTER FOR SCREENING MAMMOGRAM FOR MALIGNANT NEOPLASM OF BREAST: ICD-10-CM

## 2022-10-21 DIAGNOSIS — N80.9 ENDOMETRIOSIS: ICD-10-CM

## 2022-10-21 PROCEDURE — 77067 SCR MAMMO BI INCL CAD: CPT | Mod: TC,PO

## 2022-10-21 PROCEDURE — 77063 BREAST TOMOSYNTHESIS BI: CPT | Mod: TC,PO

## 2022-10-21 PROCEDURE — 76830 TRANSVAGINAL US NON-OB: CPT | Mod: TC,PO

## 2022-10-27 ENCOUNTER — PATIENT MESSAGE (OUTPATIENT)
Dept: OBSTETRICS AND GYNECOLOGY | Facility: CLINIC | Age: 42
End: 2022-10-27
Payer: COMMERCIAL

## 2022-11-04 ENCOUNTER — PATIENT MESSAGE (OUTPATIENT)
Dept: NEUROSURGERY | Facility: CLINIC | Age: 42
End: 2022-11-04
Payer: COMMERCIAL

## 2022-11-28 ENCOUNTER — PATIENT MESSAGE (OUTPATIENT)
Dept: NEUROLOGY | Facility: CLINIC | Age: 42
End: 2022-11-28
Payer: COMMERCIAL

## 2022-12-02 ENCOUNTER — HOSPITAL ENCOUNTER (OUTPATIENT)
Dept: RADIOLOGY | Facility: HOSPITAL | Age: 42
Discharge: HOME | End: 2022-12-02
Attending: OBSTETRICS & GYNECOLOGY
Payer: COMMERCIAL

## 2022-12-02 DIAGNOSIS — R92.8 ABNORMAL MAMMOGRAM: ICD-10-CM

## 2022-12-02 PROCEDURE — 77065 DX MAMMO INCL CAD UNI: CPT | Mod: TC,PO,RT

## 2022-12-11 ENCOUNTER — PATIENT MESSAGE (OUTPATIENT)
Dept: OTOLARYNGOLOGY | Facility: CLINIC | Age: 42
End: 2022-12-11
Payer: COMMERCIAL

## 2022-12-13 ENCOUNTER — OFFICE VISIT (OUTPATIENT)
Dept: OTOLARYNGOLOGY | Facility: CLINIC | Age: 42
End: 2022-12-13
Payer: COMMERCIAL

## 2022-12-13 VITALS — BODY MASS INDEX: 21.26 KG/M2 | WEIGHT: 120 LBS

## 2022-12-13 DIAGNOSIS — J32.9 CHRONIC SINUSITIS, UNSPECIFIED LOCATION: Primary | ICD-10-CM

## 2022-12-13 DIAGNOSIS — J30.9 ALLERGIC RHINITIS, UNSPECIFIED SEASONALITY, UNSPECIFIED TRIGGER: ICD-10-CM

## 2022-12-13 DIAGNOSIS — R43.0 ANOSMIA: ICD-10-CM

## 2022-12-13 PROCEDURE — 3008F BODY MASS INDEX DOCD: CPT | Mod: CPTII,S$GLB,, | Performed by: STUDENT IN AN ORGANIZED HEALTH CARE EDUCATION/TRAINING PROGRAM

## 2022-12-13 PROCEDURE — 1160F RVW MEDS BY RX/DR IN RCRD: CPT | Mod: CPTII,S$GLB,, | Performed by: STUDENT IN AN ORGANIZED HEALTH CARE EDUCATION/TRAINING PROGRAM

## 2022-12-13 PROCEDURE — 31231 NASAL ENDOSCOPY DX: CPT | Mod: S$GLB,,, | Performed by: STUDENT IN AN ORGANIZED HEALTH CARE EDUCATION/TRAINING PROGRAM

## 2022-12-13 PROCEDURE — 1160F PR REVIEW ALL MEDS BY PRESCRIBER/CLIN PHARMACIST DOCUMENTED: ICD-10-PCS | Mod: CPTII,S$GLB,, | Performed by: STUDENT IN AN ORGANIZED HEALTH CARE EDUCATION/TRAINING PROGRAM

## 2022-12-13 PROCEDURE — 1159F MED LIST DOCD IN RCRD: CPT | Mod: CPTII,S$GLB,, | Performed by: STUDENT IN AN ORGANIZED HEALTH CARE EDUCATION/TRAINING PROGRAM

## 2022-12-13 PROCEDURE — 1159F PR MEDICATION LIST DOCUMENTED IN MEDICAL RECORD: ICD-10-PCS | Mod: CPTII,S$GLB,, | Performed by: STUDENT IN AN ORGANIZED HEALTH CARE EDUCATION/TRAINING PROGRAM

## 2022-12-13 PROCEDURE — 99213 PR OFFICE/OUTPT VISIT, EST, LEVL III, 20-29 MIN: ICD-10-PCS | Mod: 25,S$GLB,, | Performed by: STUDENT IN AN ORGANIZED HEALTH CARE EDUCATION/TRAINING PROGRAM

## 2022-12-13 PROCEDURE — 99999 PR PBB SHADOW E&M-EST. PATIENT-LVL III: CPT | Mod: PBBFAC,,, | Performed by: STUDENT IN AN ORGANIZED HEALTH CARE EDUCATION/TRAINING PROGRAM

## 2022-12-13 PROCEDURE — 3008F PR BODY MASS INDEX (BMI) DOCUMENTED: ICD-10-PCS | Mod: CPTII,S$GLB,, | Performed by: STUDENT IN AN ORGANIZED HEALTH CARE EDUCATION/TRAINING PROGRAM

## 2022-12-13 PROCEDURE — 99999 PR PBB SHADOW E&M-EST. PATIENT-LVL III: ICD-10-PCS | Mod: PBBFAC,,, | Performed by: STUDENT IN AN ORGANIZED HEALTH CARE EDUCATION/TRAINING PROGRAM

## 2022-12-13 PROCEDURE — 99213 OFFICE O/P EST LOW 20 MIN: CPT | Mod: 25,S$GLB,, | Performed by: STUDENT IN AN ORGANIZED HEALTH CARE EDUCATION/TRAINING PROGRAM

## 2022-12-13 PROCEDURE — 31231 PR NASAL ENDOSCOPY, DX: ICD-10-PCS | Mod: S$GLB,,, | Performed by: STUDENT IN AN ORGANIZED HEALTH CARE EDUCATION/TRAINING PROGRAM

## 2022-12-13 NOTE — PATIENT INSTRUCTIONS
Smell Training    Smell training involves actively sniffing a few different fragrances every day for about 20 seconds each while concentrating on the scent.  You will need to create a smell training kit which often consists of the following 4 essential oils: puja, lemon, eucalyptus, and clove.  You can select other scents, but it is recommended that you select one of each from the following 4 categories: floral, fruity, spicy, and resinous.  Essential oils can be purchased online individually or in kits.  The oils themselves do not have healing properties but are convenient concentrated fragrances that can be used daily.  3.  Smell training has been shown to be most effective if performed twice a day every day for 12 weeks.    Supplements  Take Beta Carotene 25,000 units a day (no prescriptions needed).    Precautions  Make sure you have a smoke detector installed and working.  Observe expiration dates on food.    Additional Information & Support  https://abscent.org/learn-us/smell-training  https://www.[facility].org.uk/smell-training/  http://www.[employer]ell.org  http://www.newsworks.org/index.php/local/item/65224-can-researchers-re-ignite-someones-sense-of-smell-through-nasal-stem-cells  Support the [employer]: The [employer] Project https://www.Mobak[employer].Adimab/story/Monellanosmiahope

## 2022-12-20 ENCOUNTER — OFFICE VISIT (OUTPATIENT)
Dept: NEUROSURGERY | Facility: CLINIC | Age: 42
End: 2022-12-20
Payer: COMMERCIAL

## 2022-12-20 ENCOUNTER — HOSPITAL ENCOUNTER (OUTPATIENT)
Dept: RADIOLOGY | Facility: HOSPITAL | Age: 42
Discharge: HOME | End: 2022-12-20
Attending: NEUROLOGICAL SURGERY
Payer: COMMERCIAL

## 2022-12-20 VITALS
HEIGHT: 63 IN | WEIGHT: 121.69 LBS | SYSTOLIC BLOOD PRESSURE: 110 MMHG | HEART RATE: 90 BPM | DIASTOLIC BLOOD PRESSURE: 74 MMHG | BODY MASS INDEX: 21.56 KG/M2

## 2022-12-20 DIAGNOSIS — E23.6 PITUITARY CYST: Primary | ICD-10-CM

## 2022-12-20 DIAGNOSIS — E23.6 PITUITARY CYST: ICD-10-CM

## 2022-12-20 PROCEDURE — 99214 PR OFFICE/OUTPT VISIT, EST, LEVL IV, 30-39 MIN: ICD-10-PCS | Mod: S$GLB,,, | Performed by: NEUROLOGICAL SURGERY

## 2022-12-20 PROCEDURE — 1159F MED LIST DOCD IN RCRD: CPT | Mod: CPTII,S$GLB,, | Performed by: NEUROLOGICAL SURGERY

## 2022-12-20 PROCEDURE — 99999 PR PBB SHADOW E&M-EST. PATIENT-LVL V: CPT | Mod: PBBFAC,,, | Performed by: NEUROLOGICAL SURGERY

## 2022-12-20 PROCEDURE — 25500020 PHARM REV CODE 255: Performed by: NEUROLOGICAL SURGERY

## 2022-12-20 PROCEDURE — 70553 MRI PITUITARY W W/O CONTRAST: ICD-10-PCS | Mod: 26,,, | Performed by: RADIOLOGY

## 2022-12-20 PROCEDURE — 3078F PR MOST RECENT DIASTOLIC BLOOD PRESSURE < 80 MM HG: ICD-10-PCS | Mod: CPTII,S$GLB,, | Performed by: NEUROLOGICAL SURGERY

## 2022-12-20 PROCEDURE — 3008F BODY MASS INDEX DOCD: CPT | Mod: CPTII,S$GLB,, | Performed by: NEUROLOGICAL SURGERY

## 2022-12-20 PROCEDURE — 99214 OFFICE O/P EST MOD 30 MIN: CPT | Mod: S$GLB,,, | Performed by: NEUROLOGICAL SURGERY

## 2022-12-20 PROCEDURE — 70553 MRI BRAIN STEM W/O & W/DYE: CPT | Mod: TC

## 2022-12-20 PROCEDURE — 1160F RVW MEDS BY RX/DR IN RCRD: CPT | Mod: CPTII,S$GLB,, | Performed by: NEUROLOGICAL SURGERY

## 2022-12-20 PROCEDURE — 3008F PR BODY MASS INDEX (BMI) DOCUMENTED: ICD-10-PCS | Mod: CPTII,S$GLB,, | Performed by: NEUROLOGICAL SURGERY

## 2022-12-20 PROCEDURE — 70553 MRI BRAIN STEM W/O & W/DYE: CPT | Mod: 26,,, | Performed by: RADIOLOGY

## 2022-12-20 PROCEDURE — 3074F SYST BP LT 130 MM HG: CPT | Mod: CPTII,S$GLB,, | Performed by: NEUROLOGICAL SURGERY

## 2022-12-20 PROCEDURE — 99999 PR PBB SHADOW E&M-EST. PATIENT-LVL V: ICD-10-PCS | Mod: PBBFAC,,, | Performed by: NEUROLOGICAL SURGERY

## 2022-12-20 PROCEDURE — 3074F PR MOST RECENT SYSTOLIC BLOOD PRESSURE < 130 MM HG: ICD-10-PCS | Mod: CPTII,S$GLB,, | Performed by: NEUROLOGICAL SURGERY

## 2022-12-20 PROCEDURE — 3078F DIAST BP <80 MM HG: CPT | Mod: CPTII,S$GLB,, | Performed by: NEUROLOGICAL SURGERY

## 2022-12-20 PROCEDURE — 1159F PR MEDICATION LIST DOCUMENTED IN MEDICAL RECORD: ICD-10-PCS | Mod: CPTII,S$GLB,, | Performed by: NEUROLOGICAL SURGERY

## 2022-12-20 PROCEDURE — 1160F PR REVIEW ALL MEDS BY PRESCRIBER/CLIN PHARMACIST DOCUMENTED: ICD-10-PCS | Mod: CPTII,S$GLB,, | Performed by: NEUROLOGICAL SURGERY

## 2022-12-20 PROCEDURE — A9585 GADOBUTROL INJECTION: HCPCS | Performed by: NEUROLOGICAL SURGERY

## 2022-12-20 RX ORDER — GADOBUTROL 604.72 MG/ML
3 INJECTION INTRAVENOUS
Status: COMPLETED | OUTPATIENT
Start: 2022-12-20 | End: 2022-12-20

## 2022-12-20 RX ADMIN — GADOBUTROL 3 ML: 604.72 INJECTION INTRAVENOUS at 12:12

## 2022-12-20 NOTE — PROGRESS NOTES
"CHIEF COMPLAINT:  Pituitary cyst    INTERVAL HISTORY (12/20/22):  Continued surveillance of pituitary cyst.  Patient recently underwent endonasal endoscopic sinus procedure for chronic sinusitis and infections.  She and her  report that her headaches and functionality have improved since that procedure.  However she recently contracted COVID and has a return of her headaches.  Her new updated MRI does not show any change in the size or characteristics of the pituitary cyst.    INTERVAL HISTORY (1/25/22):  The patient returns for surveillance of pituitary cyst.  Since her last visit patient has been seen by Dr. House of Neurology for her headaches.  She has undergone 1 Botox injection and 3 doses of emgality.  These have helped only marginally.  She continues to have significant headaches that she describes as pressure and tension.  She also notes that she has had some nasal drainage and has found that Sudafed helps with the head pressure.  She was seen by Ophthalmology informed her that her vision was good.  She will get me those reports.  She still feels like the headaches are debilitating.    HPI:  Wanda Singleton is a 42 y.o.  female who is referred to me for evaluation of incidentally discovered pituitary cyst.  Cyst has remained stable over 2 MRIs  by 1 month.  This was discovered after developing a constellation of symptoms including headache, blurry vision, fatigue, dizziness that have progressively worsened.  She is typically very active (rides bike, yoga) but is now unable to do 2/2 sxs.      Started with sinus pressure in Jan 1, 2021 and was treated for allergies but did not help sxs.  This progressed to "beating" sensation in her head that correlated with her heartbeat.  This progressed to headache that involves whole and radiates from back around front bilaterally.  Currently a combination of pain and pressure.  She also c/o throat pain and feels like "someone is choking her."  " She also reports dizziness and was eval by ENT and vertigo was r/o  She attended PT for vertigo but did not help.  She reports falling over and having difficulty getting up requiring her partner to pick her up.  Symptoms are worse on her period.  She was seen by neurology who diagnosed her with a headache.  Fioricet is only thing that helps minimally.    She has endometriosis with heavy periods with only 2 weeks off in between.    Hyperprolactinemia:  []  breast tenderness []  nipple discharge   [x]  Menstrual Irregularity (endometriosis)        []  Denies                  Thyroid:  [x]  fatigue       [x]  weight change (loss)      []  temp intolerance (cold)                 []  Denies   []  BM change           [x]  skin/hair change (losing hair)   [] palpitations  []  tremor        []  Denies       Growth Hormone Excess:  []  increase hand/foot size        []  teeth spacing change                               [x]  Denies  []  worse glycemic control/htn           []  Carpal tunnel                               [x]  Denies       Cushing's syndrome:  []  Easy bruising         []  Weight gain           []  Worse glycemic control  []  HTN                       []  Acne                      []  Hirsutism  []  Striae                     []  Menstrual change [x]  Denies     Gonadotrophs:      [x]  Irregular menses   []  Postmenopausal   []  Decreased libido   []  ED                         []  Denies    DI:   [x]  polyuria                 [x]  Polydipsia              []  Nocturia x 1   []  Denies                Review of patient's allergies indicates:   Allergen Reactions    Adhesive Blisters, Hives, Itching, Rash and Swelling    Bee sting kit Hives, Itching, Nausea Only and Swelling    Fluticasone Anxiety, Other (See Comments), Palpitations and Shortness Of Breath    Guaifenesin Anxiety, Hallucinations, Palpitations and Shortness Of Breath    Horseradish root extract Anaphylaxis, Hives, Nausea And Vomiting, Palpitations,  Shortness Of Breath and Swelling    Bleach (sodium hypochlorite)     Horseradish     Levofloxacin     Oxycodone Hives and Itching    Latex, natural rubber Hives, Itching and Rash    Penicillins Hives and Nausea And Vomiting    Sulfa (sulfonamide antibiotics) Anxiety, Diarrhea, Hives, Itching, Nausea And Vomiting and Palpitations       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      Past Surgical History:   Procedure Laterality Date    ETHMOIDECTOMY  7/21/2022    Procedure: ETHMOIDECTOMY;  Surgeon: Delvis Galloway MD;  Location: [facility] OR [address] [place] [address];  Service: ENT;;    FRONTAL SINUS OBLITERATION  7/21/2022    Procedure: SINUSOTOMY, FRONTAL SINUS, OBLITERATIVE;  Surgeon: Delvis Galloway MD;  Location: [facility] OR [address] [place] [address];  Service: ENT;;    FUNCTIONAL ENDOSCOPIC SINUS SURGERY (FESS) USING COMPUTER-ASSISTED NAVIGATION Bilateral 7/21/2022    Procedure: FESS, USING COMPUTER-ASSISTED NAVIGATION;  Surgeon: Delvis Galloway MD;  Location: [facility] OR [address] [place] [address];  Service: ENT;  Laterality: Bilateral;    LAPAROSCOPY      x2 for endometriosis    LEG SURGERY      x8    MAXILLARY ANTROSTOMY  7/21/2022    Procedure: MAXILLARY ANTROSTOMY;  Surgeon: Delvis Galloway MD;  Location: [facility] OR [address] [place] [address];  Service: ENT;;     Family History   Problem Relation Age of Onset    Breast cancer Neg Hx     Colon cancer Neg Hx     Ovarian cancer Neg Hx      Social History     Tobacco Use    Smoking status: Every Day     Packs/day: 0.50     Types: Cigarettes    Smokeless tobacco: Never   Substance Use Topics    Alcohol use: Not Currently    Drug use: Not Currently        ROS    OBJECTIVE:   Vital Signs:  Pulse: 90 (12/20/22 1403)  BP: 110/74 (12/20/22 1403)    Physical Exam:  Constitutional: Patient sitting comfortably in chair. Appears well developed and well nourished.  Skin: Exposed areas are intact without abnormal markings, rashes or other  lesions.  HEENT: Normocephalic. Normal conjunctivae.  Cardiovascular: Normal rate and regular rhythm.  Respiratory: Chest wall rises and falls symmetrically, without signs of respiratory distress.  Abdomen: Soft and non-tender.  Extremities: Warm and without edema. Calves supple, non-tender.  Psych/Behavior: Normal affect.    Neurological:    Mental status: Alert and oriented. Conversational and appropriate.       Cranial Nerves: VFF to confrontation. PERRL. EOMI without nystagmus. Facial STLT normal and symmetric. Strong, symmetric muscles of mastication. Facial strength full and symmetric. Hearing equal bilaterally to finger rub. Palate and uvula rise and fall normally in midline. Shoulder shrug 5/5 strength. Tongue midline.     Motor:    Upper:  Deltoids Triceps Biceps WE WF     R 5/5 5/5 5/5 5/5 5/5 5/5    L 5/5 5/5 5/5 5/5 5/5 5/5      Lower:  HF KE KF DF PF EHL    R 5/5 5/5 5/5 5/5 5/5 5/5    L 5/5 5/5 5/5 5/5 5/5 5/5     Sensory: Intact sensation to light touch in all extremities. Romberg negative.    Reflexes:          DTR: 2+ symmetrically throughout.     Pierce's: Negative.     Babinski's: Negative.     Clonus: Negative.    Cerebellar: Finger-to-nose and rapid alternating movements normal.     Gait stable    Diagnostic Results:  All imaging was independently reviewed by me.    MRI brain, dated 12/20/22:  1. Stable pituitary cyst   2. No optic nerve compression    MRI brain, dated 1/7/22:  1. Stable pituitary cyst   2. No optic nerve compression    MRI brain, dated 10/1/21:  1. Enhancing mass with sella (0.6 x 0.8 x 0.8cm)  2. No optic nerve compression    MRI brain, dated 9/13/21:  1. Mass appears stable to somewhat larger than subsequent scan  2. SWI does not appear to show any hemorrhage        ASSESSMENT/PLAN:     Problem List Items Addressed This Visit          Endocrine    Pituitary cyst - Primary    Relevant Orders    MRI Pituitary W W/O Contrast       Small pituitary cystic lesion of unclear  etiology found incidentally (likely Rathke's cyst).  Remains stable on surveillance MRI.  HA improved following sinus procedure.    - RTC in 1yr with pituitary MRI  - F/u with Dr House (neurology) for headaches    The patient understands and agrees with the plan of care. All questions were answered.    Time spent on this encounter: 30 minutes. This includes face-to-face time and non-face to face time preparing to see the patient (eg, review of tests), obtaining and/or reviewing separately obtained history, documenting clinical information in the electronic or other health record, independently interpreting results and communicating results to the patient/family/caregiver, or care coordinator.          .    ;

## 2022-12-30 ENCOUNTER — PATIENT MESSAGE (OUTPATIENT)
Dept: OTOLARYNGOLOGY | Facility: CLINIC | Age: 42
End: 2022-12-30
Payer: COMMERCIAL

## 2023-01-05 ENCOUNTER — PATIENT MESSAGE (OUTPATIENT)
Dept: NEUROLOGY | Facility: CLINIC | Age: 43
End: 2023-01-05
Payer: COMMERCIAL

## 2023-02-07 ENCOUNTER — OFFICE VISIT (OUTPATIENT)
Dept: OBSTETRICS AND GYNECOLOGY | Facility: CLINIC | Age: 43
End: 2023-02-07
Payer: COMMERCIAL

## 2023-02-07 DIAGNOSIS — N80.9 ENDOMETRIOSIS: Primary | ICD-10-CM

## 2023-02-07 PROCEDURE — 1159F PR MEDICATION LIST DOCUMENTED IN MEDICAL RECORD: ICD-10-PCS | Mod: CPTII,95,, | Performed by: OBSTETRICS & GYNECOLOGY

## 2023-02-07 PROCEDURE — 1160F PR REVIEW ALL MEDS BY PRESCRIBER/CLIN PHARMACIST DOCUMENTED: ICD-10-PCS | Mod: CPTII,95,, | Performed by: OBSTETRICS & GYNECOLOGY

## 2023-02-07 PROCEDURE — 99212 PR OFFICE/OUTPT VISIT, EST, LEVL II, 10-19 MIN: ICD-10-PCS | Mod: 95,,, | Performed by: OBSTETRICS & GYNECOLOGY

## 2023-02-07 PROCEDURE — 1160F RVW MEDS BY RX/DR IN RCRD: CPT | Mod: CPTII,95,, | Performed by: OBSTETRICS & GYNECOLOGY

## 2023-02-07 PROCEDURE — 1159F MED LIST DOCD IN RCRD: CPT | Mod: CPTII,95,, | Performed by: OBSTETRICS & GYNECOLOGY

## 2023-02-07 PROCEDURE — 99212 OFFICE O/P EST SF 10 MIN: CPT | Mod: 95,,, | Performed by: OBSTETRICS & GYNECOLOGY

## 2023-02-07 NOTE — ASSESSMENT & PLAN NOTE
Pain is unimproved. Has not yet tried Aygestin. May desire diagnostic LSC at some point this year (no surgery in the past 8-9 years). Patient advised to contact the office with questions/concerns about medication or desire to proceed with surgery. She verbalized understanding and is amenable to the plan.

## 2023-02-07 NOTE — PROGRESS NOTES
The patient location is: [facility]  The chief complaint leading to consultation is: Medication follow up  Visit type: audio only, patient unable to access the video portion  Total time spent with patient: 10 minute    Each patient to whom he or she provides medical services by telemedicine is:  (1) informed of the relationship between the physician and patient and the respective role of any other health care provider with respect to management of the patient; and (2) notified that he or she may decline to receive medical services by telemedicine and may withdraw from such care at any time.      Chief Complaint: Endometriosis/Chronic pelvic pain     HPI:      Wanda Singleton is a 42 y.o.  who presents via virtual visit to discuss endometriosis and chronic pelvic pain; history of LSC-proven endo. Hasn't tried Aygestin as prescribed. Recently got Covid. Sinus pressure is worse than period pain at this point in time and she has significant concerns about possible side effects that she read about online (ie blood clots). Wants to wait to start medication until  is present (travels for work).     ROS:     GENERAL: Denies fevers or chills. Feeling well overall.   ABDOMEN: Denies abdominal pain, constipation, diarrhea, nausea, vomiting, change in appetite.   URINARY: Denies frequency, dysuria, hematuria.  GYNECOLOGIC: See HPI.  NEUROLOGIC: Denies syncope or weakness.     Physical Exam:      PHYSICAL EXAM:  There were no vitals taken for this visit.  There is no height or weight on file to calculate BMI.     APPEARANCE: Well nourished, well developed, in no acute distress.  Exam deferred due to televisit    Results:      TVUS (10/2022): Uterus 9x6x5 cm, EMS 4 mm, 1.2 cm fibroid, ROV with 1.5cm endometrioma vs hemorrhagic cyst, LOV wnl. Small FF in the pelvis.   Pap (10/2022): NILM/HPV neg    Assessment/Plan:     Problem List Items Addressed This Visit          Renal/    Endometriosis - Primary     Current Assessment & Plan     Pain is unimproved. Has not yet tried Aygestin. May desire diagnostic LSC at some point this year (no surgery in the past 8-9 years). Patient advised to contact the office with questions/concerns about medication or desire to proceed with surgery. She verbalized understanding and is amenable to the plan.             Counseling:       Use of the [facility] Patient Portal discussed and encouraged during today's visit.       Yenny Dang MD  Department of Obstetrics & Gynecology  [facility] m

## 2023-02-14 ENCOUNTER — OFFICE VISIT (OUTPATIENT)
Dept: OTOLARYNGOLOGY | Facility: CLINIC | Age: 43
End: 2023-02-14
Payer: COMMERCIAL

## 2023-02-14 VITALS
WEIGHT: 117 LBS | BODY MASS INDEX: 20.73 KG/M2 | SYSTOLIC BLOOD PRESSURE: 121 MMHG | DIASTOLIC BLOOD PRESSURE: 79 MMHG | HEART RATE: 97 BPM

## 2023-02-14 DIAGNOSIS — Z98.890 STATUS POST FUNCTIONAL ENDOSCOPIC SINUS SURGERY (FESS): ICD-10-CM

## 2023-02-14 DIAGNOSIS — J30.9 ALLERGIC RHINITIS, UNSPECIFIED SEASONALITY, UNSPECIFIED TRIGGER: ICD-10-CM

## 2023-02-14 DIAGNOSIS — J32.9 CHRONIC SINUSITIS, UNSPECIFIED LOCATION: Primary | ICD-10-CM

## 2023-02-14 PROCEDURE — 3074F PR MOST RECENT SYSTOLIC BLOOD PRESSURE < 130 MM HG: ICD-10-PCS | Mod: CPTII,S$GLB,, | Performed by: STUDENT IN AN ORGANIZED HEALTH CARE EDUCATION/TRAINING PROGRAM

## 2023-02-14 PROCEDURE — 3078F PR MOST RECENT DIASTOLIC BLOOD PRESSURE < 80 MM HG: ICD-10-PCS | Mod: CPTII,S$GLB,, | Performed by: STUDENT IN AN ORGANIZED HEALTH CARE EDUCATION/TRAINING PROGRAM

## 2023-02-14 PROCEDURE — 3008F PR BODY MASS INDEX (BMI) DOCUMENTED: ICD-10-PCS | Mod: CPTII,S$GLB,, | Performed by: STUDENT IN AN ORGANIZED HEALTH CARE EDUCATION/TRAINING PROGRAM

## 2023-02-14 PROCEDURE — 1160F PR REVIEW ALL MEDS BY PRESCRIBER/CLIN PHARMACIST DOCUMENTED: ICD-10-PCS | Mod: CPTII,S$GLB,, | Performed by: STUDENT IN AN ORGANIZED HEALTH CARE EDUCATION/TRAINING PROGRAM

## 2023-02-14 PROCEDURE — 31231 PR NASAL ENDOSCOPY, DX: ICD-10-PCS | Mod: S$GLB,,, | Performed by: STUDENT IN AN ORGANIZED HEALTH CARE EDUCATION/TRAINING PROGRAM

## 2023-02-14 PROCEDURE — 99999 PR PBB SHADOW E&M-EST. PATIENT-LVL V: ICD-10-PCS | Mod: PBBFAC,,, | Performed by: STUDENT IN AN ORGANIZED HEALTH CARE EDUCATION/TRAINING PROGRAM

## 2023-02-14 PROCEDURE — 99999 PR PBB SHADOW E&M-EST. PATIENT-LVL V: CPT | Mod: PBBFAC,,, | Performed by: STUDENT IN AN ORGANIZED HEALTH CARE EDUCATION/TRAINING PROGRAM

## 2023-02-14 PROCEDURE — 3078F DIAST BP <80 MM HG: CPT | Mod: CPTII,S$GLB,, | Performed by: STUDENT IN AN ORGANIZED HEALTH CARE EDUCATION/TRAINING PROGRAM

## 2023-02-14 PROCEDURE — 99214 OFFICE O/P EST MOD 30 MIN: CPT | Mod: 25,S$GLB,, | Performed by: STUDENT IN AN ORGANIZED HEALTH CARE EDUCATION/TRAINING PROGRAM

## 2023-02-14 PROCEDURE — 1159F PR MEDICATION LIST DOCUMENTED IN MEDICAL RECORD: ICD-10-PCS | Mod: CPTII,S$GLB,, | Performed by: STUDENT IN AN ORGANIZED HEALTH CARE EDUCATION/TRAINING PROGRAM

## 2023-02-14 PROCEDURE — 1160F RVW MEDS BY RX/DR IN RCRD: CPT | Mod: CPTII,S$GLB,, | Performed by: STUDENT IN AN ORGANIZED HEALTH CARE EDUCATION/TRAINING PROGRAM

## 2023-02-14 PROCEDURE — 99214 PR OFFICE/OUTPT VISIT, EST, LEVL IV, 30-39 MIN: ICD-10-PCS | Mod: 25,S$GLB,, | Performed by: STUDENT IN AN ORGANIZED HEALTH CARE EDUCATION/TRAINING PROGRAM

## 2023-02-14 PROCEDURE — 31231 NASAL ENDOSCOPY DX: CPT | Mod: S$GLB,,, | Performed by: STUDENT IN AN ORGANIZED HEALTH CARE EDUCATION/TRAINING PROGRAM

## 2023-02-14 PROCEDURE — 3008F BODY MASS INDEX DOCD: CPT | Mod: CPTII,S$GLB,, | Performed by: STUDENT IN AN ORGANIZED HEALTH CARE EDUCATION/TRAINING PROGRAM

## 2023-02-14 PROCEDURE — 3074F SYST BP LT 130 MM HG: CPT | Mod: CPTII,S$GLB,, | Performed by: STUDENT IN AN ORGANIZED HEALTH CARE EDUCATION/TRAINING PROGRAM

## 2023-02-14 PROCEDURE — 1159F MED LIST DOCD IN RCRD: CPT | Mod: CPTII,S$GLB,, | Performed by: STUDENT IN AN ORGANIZED HEALTH CARE EDUCATION/TRAINING PROGRAM

## 2023-02-14 NOTE — PROGRESS NOTES
Subjective:      Wanda is a 42 y.o. female who comes for follow-up of sinusitis.  Her last visit with me was on 12/13/2022.  Still having significant facial pressure, head aches. On migraine injections, helps some but still will have persistent frontal head aches.     Her current sinus regime consists of: Saline spray    The assessment of quality and severity of symptoms as measured by the SNOT-22 score is 61.    The patient's medications, allergies, past medical, surgical, social and family histories were reviewed and updated as appropriate.    Review of Systems   Constitutional:  Positive for malaise/fatigue.   Eyes: Negative.    Respiratory: Negative.     Cardiovascular: Negative.    Gastrointestinal: Negative.    Genitourinary: Negative.    Musculoskeletal:  Positive for neck pain.   Skin: Negative.    Neurological:  Positive for dizziness and headaches.   Psychiatric/Behavioral:  The patient is nervous/anxious.       Answers submitted by the patient for this visit:  Review of Symptoms Questionnaire  (Submitted on 2/9/2023)  sinus pressure : Yes  trouble swallowing: Yes  Food Allergies?: Yes  Seasonal Allergies?: Yes  Cold all of the time? : Yes  Light-headedness: Yes  None of these: Yes  sleep disturbance: Yes    A detailed review of systems was obtained with pertinent positives as per the above HPI, and otherwise negative.        Objective:     /79 (Patient Position: Sitting)   Pulse 97   Wt 53.1 kg (117 lb)   BMI 20.73 kg/m²        Constitutional:   Vital signs are normal. She appears well-developed. Normal speech.      Head:  Normocephalic and atraumatic.     Ears:    Right Ear: No drainage or tenderness. No middle ear effusion.   Left Ear: No drainage or tenderness.  No middle ear effusion.     Mouth/Throat  Oropharynx clear and moist without lesions or asymmetry and normal uvula midline. No trismus. No oropharyngeal exudate. Mirror exam not performed due to patient tolerance.  Mirror exam  not performed due to patient tolerance.      Neck:  Neck normal without thyromegaly masses, asymmetry, normal tracheal structure, crepitus, and tenderness, thyroid normal and trachea normal.     Pulmonary/Chest:   Effort normal.     Psychiatric:   She has a normal mood and affect. Her speech is normal.     Skin:   No abrasions, lacerations, lesions, or rashes.     Procedure    Nasal endoscopy performed.  See procedure note.    Nasal Endoscopy:  2/14/2023    The use of diagnostic nasal endoscopy was considered medically necessary for the evaluation and visualization of the nasal anatomy for symptoms suggestive of nasal or sinus origin. Physical examination (including a nasal speculum evaluation) did not provide sufficient clinical information to establish a diagnosis, or symptoms did not improve or worsened following treatment.     The nasal cavity was decongested with topical 1% phenylephrine and anesthetized with 4% lidocaine.  A rigid 0-degree endoscope was introduced into the nasal cavity.    The patient was seated in the examination chair. After discussion of risks and benefits, a nasal endoscope was inserted into the nose the endoscope was passed along the left nasal floor to the nasopharynx. It was then passed between the middle and superior meatus, nasal turbinates, nasal septum, nasopharynx and sphenoethmoid region. The nasal endoscope was withdrawn and there was no complications. An identical procedure was performed on the right side. I was present for the entire procedure.The patient tolerated the above procedure well. The findings of this procedure can be found in the dictated note from 2/14/2023 visit.                                              Ethmoid cavities patient, no scarring. MT well medialized bilaterally. No purulence in ethmoid cavities. Fontal maxillary and sphenoid cavities patient. No purulence. Sinuses very well aerated.     Data Reviewed    WBC (K/uL)   Date Value   10/07/2022 6.80      Eosinophil % (%)   Date Value   10/07/2022 0.6     Eos # (K/uL)   Date Value   10/07/2022 0.0     Platelets (K/uL)   Date Value   10/07/2022 235     Glucose (mg/dL)   Date Value   10/26/2021 87     No results found for: IGE    No sinus imaging available.      Assessment:     1. Chronic sinusitis, unspecified location    2. Allergic rhinitis, unspecified seasonality, unspecified trigger    3. Status post functional endoscopic sinus surgery (FESS)       Plan:     - Long discussion with the patient &  about her symptoms and  status of her sinuses  - Sinuses widely patient without any evidence of purulence within any sinuses  - Discussed starting topical irrigations with budesonide, she is obviously hesitant given her previous reaction (Anxiety, Palpitations, & Shortness Of Breath) to flonase.   - Given findings (examination, sinonasal endoscopy, and/or imaging performed and reviewed) and her history related to these sinonasal issues that have been refractory to current medical management, I feel escalation of medical management is indicated at this time. I have specifically recommended budesonide irrigations for stronger topical steroid therapy. This is intended to both improve overall management and symptom control, and to reduce potential need for systemic steroids and the associated adverse effects and risks associated with recurrent systemic steroid therapy. I counseled her on the off-label nature of this particular use/delivery of budesonide, and she consented to use.   -Specific instructions regarding the use of budesonide nasal irrigations were provided, including review of use of standard nasal saline irrigations and the addition of budesonide to these nasal saline irrigations. Instructions were also provided on obtaining this medication and a prescription was sent in for the patient (to [facility]) so that home delivery/mail order can be arranged. I have authorized once daily dosing  if financial issues prevent twice daily use (recommended), as well as options for alternative steroids to be used should the advised budesonide be prohibitively expensive. All questions regarding this were answered, and I encouraged her to call for any additional questions, concerns, or if there were any issues with obtaining the budesonide for use in irrigations.     - RTC 3 months    Delvis Galloway MD

## 2023-02-16 ENCOUNTER — PATIENT MESSAGE (OUTPATIENT)
Dept: OTOLARYNGOLOGY | Facility: CLINIC | Age: 43
End: 2023-02-16
Payer: COMMERCIAL

## 2023-03-15 ENCOUNTER — PATIENT MESSAGE (OUTPATIENT)
Dept: OTOLARYNGOLOGY | Facility: CLINIC | Age: 43
End: 2023-03-15
Payer: COMMERCIAL

## 2023-04-03 ENCOUNTER — PATIENT MESSAGE (OUTPATIENT)
Dept: NEUROLOGY | Facility: CLINIC | Age: 43
End: 2023-04-03
Payer: COMMERCIAL

## 2023-04-04 DIAGNOSIS — G89.29 CHRONIC INTRACTABLE HEADACHE, UNSPECIFIED HEADACHE TYPE: ICD-10-CM

## 2023-04-04 DIAGNOSIS — R51.9 CHRONIC INTRACTABLE HEADACHE, UNSPECIFIED HEADACHE TYPE: ICD-10-CM

## 2023-04-04 RX ORDER — GALCANEZUMAB 120 MG/ML
120 INJECTION, SOLUTION SUBCUTANEOUS
Qty: 3 EACH | Refills: 0 | Status: SHIPPED | OUTPATIENT
Start: 2023-04-04 | End: 2024-02-23

## 2023-04-06 ENCOUNTER — PATIENT MESSAGE (OUTPATIENT)
Dept: NEUROLOGY | Facility: CLINIC | Age: 43
End: 2023-04-06
Payer: COMMERCIAL

## 2023-04-06 ENCOUNTER — TELEPHONE (OUTPATIENT)
Dept: NEUROLOGY | Facility: CLINIC | Age: 43
End: 2023-04-06
Payer: COMMERCIAL

## 2023-04-06 NOTE — TELEPHONE ENCOUNTER
----- Message from Humaira Harrell sent at 4/6/2023  2:48 PM CDT -----  Regarding: APPT REQUEST  Contact: Patient  Type: Patient Call Back         Who called: Patient         What is the request in detail: calling to sched appt for refills and check up; please advise         Best call back number: [phone]         Additional Information: prefers Friday in afternoon         Thank You

## 2023-04-11 ENCOUNTER — TELEPHONE (OUTPATIENT)
Dept: NEUROLOGY | Facility: CLINIC | Age: 43
End: 2023-04-11
Payer: COMMERCIAL

## 2023-04-11 NOTE — TELEPHONE ENCOUNTER
According to notes, FAITH Geiger M.A, submitted PA on 4/6/23. Current Rx is for 3 pens/ 90 days. Received an approval good through 4/6/2024, doc uploaded in media

## 2023-05-18 ENCOUNTER — OFFICE VISIT (OUTPATIENT)
Dept: NEUROLOGY | Facility: CLINIC | Age: 43
End: 2023-05-18
Payer: COMMERCIAL

## 2023-05-18 DIAGNOSIS — G43.011 INTRACTABLE MIGRAINE WITHOUT AURA AND WITH STATUS MIGRAINOSUS: Primary | ICD-10-CM

## 2023-05-18 PROCEDURE — 99213 OFFICE O/P EST LOW 20 MIN: CPT | Mod: 95,,, | Performed by: PSYCHIATRY & NEUROLOGY

## 2023-05-18 PROCEDURE — 99213 PR OFFICE/OUTPT VISIT, EST, LEVL III, 20-29 MIN: ICD-10-PCS | Mod: 95,,, | Performed by: PSYCHIATRY & NEUROLOGY

## 2023-05-18 RX ORDER — SUMATRIPTAN SUCCINATE 100 MG/1
100 TABLET ORAL DAILY PRN
Qty: 12 TABLET | Refills: 3 | Status: SHIPPED | OUTPATIENT
Start: 2023-05-18 | End: 2023-10-17

## 2023-05-18 RX ORDER — FREMANEZUMAB-VFRM 225 MG/1.5ML
225 INJECTION SUBCUTANEOUS
Qty: 1.5 ML | Refills: 3 | Status: SHIPPED | OUTPATIENT
Start: 2023-05-18 | End: 2023-08-16

## 2023-05-18 NOTE — PROGRESS NOTES
[facility] - NEUROLOGY 7TH FL [facility], [place]    Date: 5/18/23  Patient Name: Wanda Singleton   MRN: [id number]   PCP: Jas Gusman  Referring Provider: No ref. provider found      The patient location is: Home  The chief complaint leading to consultation is: Migraine  Visit type: Virtual visit with synchronous audio and video  Total time spent with patient: 10 minutes  Each patient to whom he or she provides medical services by telemedicine is:  (1) informed of the relationship between the physician and patient and the respective role of any other health care provider with respect to management of the patient; and (2) notified that he or she may decline to receive medical services by telemedicine and may withdraw from such care at any time.    Notes:  Assessment:   Wanda Singleton is a 43 y.o. female presenting for evaluation of chronic headache. Reviewed therapeutic options. Trial of ajovy as alternate prophylaxis with trial of imitrex for rescue.   Plan:     Problem List Items Addressed This Visit          Neuro    Intractable migraine without aura and with status migrainosus - Primary    Relevant Medications    fremanezumab-vfrm (AJOVY AUTOINJECTOR) 225 mg/1.5 mL autoinjector    sumatriptan (IMITREX) 100 MG tablet     I spent a total of 60 minutes preparing to see the patient, obtaining and reviewing history and results, performing a medically appropriate exam, counseling and educating the patient/family/caregiver, documenting clinical information, coordinating care, and ordering medications, tests, procedures, and referrals.    Aryan Meza MD  [facility]   Department of Neurology    Patient note was created using MModal Dictation.  Any errors in syntax or even information may not have been identified and edited on initial review prior to signing this note.  Subjective:   HPI:   Ms. Wanda Singleton is a 43 y.o. female presenting  "in follow-up for migraine.The patient continues to report frequent migraine headaches occurring up to daily, particularly as she approaches her dose date for emgality. She states that nurtec does not alleviate her migraines in any way. She states that botox "caused muscle spasms" and made her headaches worse. She continues to note associated nausea, photophobia, and phonophobia. She denies any other known triggers/exacerbating factors. She has no other complaints today.     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        PAST SURGICAL HISTORY:  Past Surgical History:   Procedure Laterality Date    ETHMOIDECTOMY  7/21/2022    Procedure: ETHMOIDECTOMY;  Surgeon: Delvis Galloway MD;  Location: [address];  Service: ENT;;    FRONTAL SINUS OBLITERATION  7/21/2022    Procedure: SINUSOTOMY, FRONTAL SINUS, OBLITERATIVE;  Surgeon: Delvis Galloway MD;  Location: [address];  Service: ENT;;    FUNCTIONAL ENDOSCOPIC SINUS SURGERY (FESS) USING COMPUTER-ASSISTED NAVIGATION Bilateral 7/21/2022    Procedure: FESS, USING COMPUTER-ASSISTED NAVIGATION;  Surgeon: Delvis Galloway MD;  Location: [address];  Service: ENT;  Laterality: Bilateral;    LAPAROSCOPY      x2 for endometriosis    LEG SURGERY      x8    MAXILLARY ANTROSTOMY  7/21/2022    Procedure: MAXILLARY ANTROSTOMY;  Surgeon: Delvis Galloway MD;  Location: [facility] OR [address] [place] [address];  Service: ENT;;       CURRENT MEDS:  Current Outpatient Medications   Medication Sig Dispense Refill    acetaminophen (TYLENOL) 325 MG tablet Take 2 tablets (650 mg total) by mouth every 6 (six) hours as needed for Pain. Alternate with " ibuprofen every 3 hours as needed 40 tablet 0    albuterol (PROVENTIL/VENTOLIN HFA) 90 mcg/actuation inhaler Inhale 2 puffs into the lungs every 6 (six) hours as needed.      ascorbic acid, vitamin C, (VITAMIN C) 500 MG tablet Take 500 mg by mouth once daily.      azelastine (ASTELIN) 137 mcg (0.1 %) nasal spray 1 spray (137 mcg total) by Nasal route 2 (two) times daily. 30 mL 3    diphenhydrAMINE (BENADRYL) 25 mg capsule Take 1 capsule by mouth every evening.      DM/pseudoephed/acetaminophen (PSEUDOEPHED-DM-ACETAMINOPHEN ORAL) Take by mouth.      ELDERBERRY FRUIT AND FLOWER ORAL Take 1 tablet by mouth once daily.      famotidine (PEPCID) 20 MG tablet Take 20 mg by mouth 2 (two) times daily.      galcanezumab-gnlm (EMGALITY PEN) 120 mg/mL PnIj Inject 120 mg into the skin every 28 days. NEEDS APT FOR FURTHER REFILL 3 each 0    ginkgo biloba 40 mg Tab Take by mouth.      ibuprofen (ADVIL,MOTRIN) 800 MG tablet Take 1 tablet (800 mg total) by mouth every 6 (six) hours as needed for Pain. Alternate with tylenol every 3 hours as needed. 20 tablet 0    iron 18 mg Tab Take 1 tablet by mouth once daily.      lysine (L-LYSINE) 500 mg Tab Take 1,000 mg by mouth once daily.      melatonin 5 mg Chew Take 1 tablet by mouth every evening.      melatonin-pyridoxal phos, B6, 2.5 mg- 338 mcg Subl Place under the tongue.      norethindrone (AYGESTIN) 5 mg Tab Take 1 tablet (5 mg total) by mouth once daily. (Patient not taking: Reported on 10/18/2022) 30 tablet 11    NURTEC 75 mg odt PLEASE SEE ATTACHED FOR DETAILED DIRECTIONS      omega-3 fatty acids-fish oil (FISH OIL) 300-500 mg Cap Take 1 capsule by mouth once daily.      ondansetron (ZOFRAN-ODT) 8 MG TbDL Dissolve 1 tablet (8 mg total) by mouth every 8 (eight) hours as needed (nausea). 15 tablet 0    prenat.vits,zeenat,min-iron-folic (PRENATAL VITAMIN) Tab Take 1 tablet by mouth once daily.      VITAMIN B COMPLEX ORAL Take 1 tablet by mouth once daily.      vitamin D (VITAMIN D3)  1000 units Tab Take 1,000 Units by mouth once daily.       No current facility-administered medications for this visit.     Facility-Administered Medications Ordered in Other Visits   Medication Dose Route Frequency Provider Last Rate Last Admin    haloperidol lactate injection 0.5 mg  0.5 mg Intravenous Q10 Min PRN Popeye Phelps MD           ALLERGIES:  Review of patient's allergies indicates:   Allergen Reactions    Adhesive Blisters, Hives, Itching, Rash and Swelling    Bee sting kit Hives, Itching, Nausea Only and Swelling    Fluticasone Anxiety, Other (See Comments), Palpitations and Shortness Of Breath    Guaifenesin Anxiety, Hallucinations, Palpitations and Shortness Of Breath    Horseradish root extract Anaphylaxis, Hives, Nausea And Vomiting, Palpitations, Shortness Of Breath and Swelling    Bleach (sodium hypochlorite)     Horseradish     Levofloxacin     Oxycodone Hives and Itching    Latex, natural rubber Hives, Itching and Rash    Penicillins Hives and Nausea And Vomiting    Sulfa (sulfonamide antibiotics) Anxiety, Diarrhea, Hives, Itching, Nausea And Vomiting and Palpitations       FAMILY HISTORY:  Family History   Problem Relation Age of Onset    Breast cancer Neg Hx     Colon cancer Neg Hx     Ovarian cancer Neg Hx        SOCIAL HISTORY:  Social History     Tobacco Use    Smoking status: Every Day     Packs/day: 0.50     Types: Cigarettes    Smokeless tobacco: Never   Substance Use Topics    Alcohol use: Not Currently    Drug use: Not Currently       Review of Systems:  12 system review of systems is negative except for the symptoms mentioned in HPI.      Objective:     General: NAD, well nourished   Eyes: no tearing, discharge, no erythema   ENT: moist mucous membranes of the oral cavity, nares patent    Neck: Supple, full range of motion  Cardiovascular: Appears well perfused  Lungs: Normal work of breathing, normal chest wall excursions  Skin: No rash, lesions, or breakdown on exposed  skin  Psychiatry: Mood and affect are appropriate   Abdomen: nondistended, non tender  Extremeties: No cyanosis, clubbing or edema visible    Neurological   MENTAL STATUS: Alert and oriented to person, place, and time. Attention and concentration within normal limits. Speech without dysarthria, able to name and repeat without difficulty. Recent and remote memory within normal limits   CRANIAL NERVES: Visual fields intact. PERRL. EOMI. Facial sensation intact. Face symmetrical. Hearing grossly intact. Full shoulder shrug bilaterally. Tongue protrudes midline   SENSORY: Sensation is intact to light touch throughout.   MOTOR: Normal bulk.  Moves all extremities symmetrically.  CEREBELLAR/COORDINATION/GAIT: Gait steady. Finger to nose intact. Normal rapid alternating movements.

## 2023-05-19 ENCOUNTER — TELEPHONE (OUTPATIENT)
Dept: PHARMACY | Facility: CLINIC | Age: 43
End: 2023-05-19
Payer: COMMERCIAL

## 2023-05-19 ENCOUNTER — OFFICE VISIT (OUTPATIENT)
Dept: OTOLARYNGOLOGY | Facility: CLINIC | Age: 43
End: 2023-05-19
Payer: COMMERCIAL

## 2023-05-19 VITALS
SYSTOLIC BLOOD PRESSURE: 127 MMHG | DIASTOLIC BLOOD PRESSURE: 76 MMHG | BODY MASS INDEX: 21.61 KG/M2 | HEART RATE: 83 BPM | HEIGHT: 63 IN | WEIGHT: 121.94 LBS

## 2023-05-19 DIAGNOSIS — J30.9 ALLERGIC RHINITIS, UNSPECIFIED SEASONALITY, UNSPECIFIED TRIGGER: ICD-10-CM

## 2023-05-19 DIAGNOSIS — J32.9 CHRONIC SINUSITIS, UNSPECIFIED LOCATION: Primary | ICD-10-CM

## 2023-05-19 DIAGNOSIS — Z98.890 STATUS POST FUNCTIONAL ENDOSCOPIC SINUS SURGERY (FESS): ICD-10-CM

## 2023-05-19 PROCEDURE — 3008F PR BODY MASS INDEX (BMI) DOCUMENTED: ICD-10-PCS | Mod: CPTII,S$GLB,, | Performed by: STUDENT IN AN ORGANIZED HEALTH CARE EDUCATION/TRAINING PROGRAM

## 2023-05-19 PROCEDURE — 99213 PR OFFICE/OUTPT VISIT, EST, LEVL III, 20-29 MIN: ICD-10-PCS | Mod: 25,S$GLB,, | Performed by: STUDENT IN AN ORGANIZED HEALTH CARE EDUCATION/TRAINING PROGRAM

## 2023-05-19 PROCEDURE — 1159F MED LIST DOCD IN RCRD: CPT | Mod: CPTII,S$GLB,, | Performed by: STUDENT IN AN ORGANIZED HEALTH CARE EDUCATION/TRAINING PROGRAM

## 2023-05-19 PROCEDURE — 3078F PR MOST RECENT DIASTOLIC BLOOD PRESSURE < 80 MM HG: ICD-10-PCS | Mod: CPTII,S$GLB,, | Performed by: STUDENT IN AN ORGANIZED HEALTH CARE EDUCATION/TRAINING PROGRAM

## 2023-05-19 PROCEDURE — 31231 NASAL ENDOSCOPY DX: CPT | Mod: S$GLB,,, | Performed by: STUDENT IN AN ORGANIZED HEALTH CARE EDUCATION/TRAINING PROGRAM

## 2023-05-19 PROCEDURE — 31231 PR NASAL ENDOSCOPY, DX: ICD-10-PCS | Mod: S$GLB,,, | Performed by: STUDENT IN AN ORGANIZED HEALTH CARE EDUCATION/TRAINING PROGRAM

## 2023-05-19 PROCEDURE — 99999 PR PBB SHADOW E&M-EST. PATIENT-LVL III: ICD-10-PCS | Mod: PBBFAC,,, | Performed by: STUDENT IN AN ORGANIZED HEALTH CARE EDUCATION/TRAINING PROGRAM

## 2023-05-19 PROCEDURE — 99213 OFFICE O/P EST LOW 20 MIN: CPT | Mod: 25,S$GLB,, | Performed by: STUDENT IN AN ORGANIZED HEALTH CARE EDUCATION/TRAINING PROGRAM

## 2023-05-19 PROCEDURE — 3074F SYST BP LT 130 MM HG: CPT | Mod: CPTII,S$GLB,, | Performed by: STUDENT IN AN ORGANIZED HEALTH CARE EDUCATION/TRAINING PROGRAM

## 2023-05-19 PROCEDURE — 1160F RVW MEDS BY RX/DR IN RCRD: CPT | Mod: CPTII,S$GLB,, | Performed by: STUDENT IN AN ORGANIZED HEALTH CARE EDUCATION/TRAINING PROGRAM

## 2023-05-19 PROCEDURE — 3008F BODY MASS INDEX DOCD: CPT | Mod: CPTII,S$GLB,, | Performed by: STUDENT IN AN ORGANIZED HEALTH CARE EDUCATION/TRAINING PROGRAM

## 2023-05-19 PROCEDURE — 3074F PR MOST RECENT SYSTOLIC BLOOD PRESSURE < 130 MM HG: ICD-10-PCS | Mod: CPTII,S$GLB,, | Performed by: STUDENT IN AN ORGANIZED HEALTH CARE EDUCATION/TRAINING PROGRAM

## 2023-05-19 PROCEDURE — 3078F DIAST BP <80 MM HG: CPT | Mod: CPTII,S$GLB,, | Performed by: STUDENT IN AN ORGANIZED HEALTH CARE EDUCATION/TRAINING PROGRAM

## 2023-05-19 PROCEDURE — 99999 PR PBB SHADOW E&M-EST. PATIENT-LVL III: CPT | Mod: PBBFAC,,, | Performed by: STUDENT IN AN ORGANIZED HEALTH CARE EDUCATION/TRAINING PROGRAM

## 2023-05-19 PROCEDURE — 1159F PR MEDICATION LIST DOCUMENTED IN MEDICAL RECORD: ICD-10-PCS | Mod: CPTII,S$GLB,, | Performed by: STUDENT IN AN ORGANIZED HEALTH CARE EDUCATION/TRAINING PROGRAM

## 2023-05-19 PROCEDURE — 1160F PR REVIEW ALL MEDS BY PRESCRIBER/CLIN PHARMACIST DOCUMENTED: ICD-10-PCS | Mod: CPTII,S$GLB,, | Performed by: STUDENT IN AN ORGANIZED HEALTH CARE EDUCATION/TRAINING PROGRAM

## 2023-05-19 NOTE — PROGRESS NOTES
"    Subjective:      Wanda is a 43 y.o. female who comes for follow-up of sinusitis.  Her last visit with me was on 2/14/2023. Was seen at  recently for URI/sinusitis. Started developing some right sided facial swelling. Was started on Clindamycin. Swelling improved. Still having facial pressure and head aches daily. Was seen by neurology yesterday and started on mew medication. Has yet to start.     Her current sinus regime consists of: Budesonide irrigations.     The assessment of quality and severity of symptoms as measured by the SNOT-22 score was deferred.    The patient's medications, allergies, past medical, surgical, social and family histories were reviewed and updated as appropriate.    Review of Systems   Constitutional:  Positive for malaise/fatigue.   Eyes: Negative.    Respiratory: Negative.     Gastrointestinal: Negative.    Genitourinary: Negative.    Musculoskeletal:  Positive for neck pain.   Skin: Negative.    Neurological:  Positive for dizziness and headaches.   Psychiatric/Behavioral:  Positive for depression. The patient is nervous/anxious.       Answers submitted by the patient for this visit:  Review of Symptoms Questionnaire  (Submitted on 5/17/2023)  facial swelling: Yes  sinus pressure : Yes  Sinus infection(s)?: Yes  Irregular heartbeat?: Yes  Muscle aches / pain?: Yes  Seasonal Allergies?: Yes  Cold all of the time? : Yes  Light-headedness: Yes  swollen glands: Yes  sleep disturbance: Yes      A detailed review of systems was obtained with pertinent positives as per the above HPI, and otherwise negative.        Objective:     /76   Pulse 83   Ht 5' 3" (1.6 m)   Wt 55.3 kg (121 lb 14.6 oz)   BMI 21.60 kg/m²        Constitutional:   Vital signs are normal. She appears well-developed. Normal speech.      Head:  Normocephalic and atraumatic.     Ears:    Right Ear: No drainage or tenderness. No middle ear effusion.   Left Ear: No drainage or tenderness.  No middle ear " effusion.     Mouth/Throat  Oropharynx clear and moist without lesions or asymmetry and normal uvula midline. No trismus. No oropharyngeal exudate. Mirror exam not performed due to patient tolerance.  Mirror exam not performed due to patient tolerance.      Neck:  Neck normal without thyromegaly masses, asymmetry, normal tracheal structure, crepitus, and tenderness, thyroid normal and trachea normal.     Pulmonary/Chest:   Effort normal.     Psychiatric:   She has a normal mood and affect. Her speech is normal.     Skin:   No abrasions, lacerations, lesions, or rashes.     Procedure    Nasal endoscopy performed.  See procedure note.    Nasal Endoscopy:  5/19/2023    The use of diagnostic nasal endoscopy was considered medically necessary for the evaluation and visualization of the nasal anatomy for symptoms suggestive of nasal or sinus origin. Physical examination (including a nasal speculum evaluation) did not provide sufficient clinical information to establish a diagnosis, or symptoms did not improve or worsened following treatment.     The nasal cavity was decongested with topical 1% phenylephrine and anesthetized with 4% lidocaine.  A rigid 0-degree endoscope was introduced into the nasal cavity.    The patient was seated in the examination chair. After discussion of risks and benefits, a nasal endoscope was inserted into the nose the endoscope was passed along the left nasal floor to the nasopharynx. It was then passed between the middle and superior meatus, nasal turbinates, nasal septum, nasopharynx and sphenoethmoid region. The nasal endoscope was withdrawn and there was no complications. An identical procedure was performed on the right side. I was present for the entire procedure.The patient tolerated the above procedure well. The findings of this procedure can be found in the dictated note from 5/19/2023 visit.      Ethmoid cavities patient, no scarring. MT well medialized bilaterally. No purulence in  ethmoid cavities. Fontal maxillary and sphenoid cavities patient. No purulence. Sinuses very well aerated.     Data Reviewed    WBC (K/uL)   Date Value   10/07/2022 6.80     Eosinophil % (%)   Date Value   10/07/2022 0.6     Eos # (K/uL)   Date Value   10/07/2022 0.0     Platelets (K/uL)   Date Value   10/07/2022 235     Glucose (mg/dL)   Date Value   10/26/2021 87     No results found for: IGE    No sinus imaging available.      Assessment:     1. Chronic sinusitis, unspecified location    2. Status post functional endoscopic sinus surgery (FESS)    3. Allergic rhinitis, unspecified seasonality, unspecified trigger      Plan:     - Finish course of clindamycin.  - Cont budesonide.  - RTC 3 months.     Delvis Galloway MD

## 2023-07-05 ENCOUNTER — PATIENT MESSAGE (OUTPATIENT)
Dept: NEUROLOGY | Facility: CLINIC | Age: 43
End: 2023-07-05
Payer: COMMERCIAL

## 2023-07-14 ENCOUNTER — PATIENT MESSAGE (OUTPATIENT)
Dept: NEUROLOGY | Facility: CLINIC | Age: 43
End: 2023-07-14
Payer: COMMERCIAL

## 2023-07-30 ENCOUNTER — PATIENT MESSAGE (OUTPATIENT)
Dept: OTOLARYNGOLOGY | Facility: CLINIC | Age: 43
End: 2023-07-30
Payer: COMMERCIAL

## 2023-07-31 ENCOUNTER — PATIENT MESSAGE (OUTPATIENT)
Dept: OTOLARYNGOLOGY | Facility: CLINIC | Age: 43
End: 2023-07-31
Payer: COMMERCIAL

## 2023-07-31 DIAGNOSIS — J32.9 CHRONIC SINUSITIS, UNSPECIFIED LOCATION: Primary | ICD-10-CM

## 2023-07-31 RX ORDER — DOXYCYCLINE HYCLATE 100 MG
100 TABLET ORAL 2 TIMES DAILY
Qty: 20 TABLET | Refills: 0 | Status: SHIPPED | OUTPATIENT
Start: 2023-07-31 | End: 2023-08-10

## 2023-08-06 ENCOUNTER — PATIENT MESSAGE (OUTPATIENT)
Dept: OBSTETRICS AND GYNECOLOGY | Facility: CLINIC | Age: 43
End: 2023-08-06
Payer: COMMERCIAL

## 2023-08-06 DIAGNOSIS — B37.31 YEAST VAGINITIS: Primary | ICD-10-CM

## 2023-08-07 RX ORDER — FLUCONAZOLE 150 MG/1
150 TABLET ORAL
Qty: 2 TABLET | Refills: 0 | Status: SHIPPED | OUTPATIENT
Start: 2023-08-07 | End: 2023-08-11

## 2023-08-07 RX ORDER — FLUCONAZOLE 150 MG/1
150 TABLET ORAL
Qty: 2 TABLET | Refills: 0 | Status: SHIPPED | OUTPATIENT
Start: 2023-08-07 | End: 2023-08-07

## 2023-08-08 RX ORDER — NYSTATIN AND TRIAMCINOLONE ACETONIDE 100000; 1 [USP'U]/G; MG/G
CREAM TOPICAL
Qty: 30 G | Refills: 1 | Status: SHIPPED | OUTPATIENT
Start: 2023-08-08

## 2023-08-14 ENCOUNTER — PATIENT MESSAGE (OUTPATIENT)
Dept: OTOLARYNGOLOGY | Facility: CLINIC | Age: 43
End: 2023-08-14
Payer: COMMERCIAL

## 2023-08-14 DIAGNOSIS — J32.9 CHRONIC SINUSITIS, UNSPECIFIED LOCATION: Primary | ICD-10-CM

## 2023-08-14 NOTE — ASSESSMENT & PLAN NOTE
"Patient reports laparoscopic-proven endometriosis. Was told in the past to be "sterilized or see a fertility specialist." She has no interest in hysterectomy but has significant irregular bleeding/spotting and debilitating pain around the time of her cycles, along with dyspareunia. She is present with her partner today. Will obtain lab work and US to assess anemia/hormone imbalance and presence of ovarian cysts (patient reports having cysts removed on her ovaries during her past surgeries, last was 8-9 years ago). Will start Aygestin 5 mg daily for endometriosis treatment. RTC 2-3 months for virtual visit, sooner PRN.   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 Last audiogram performed in 5/2023 showed essentially normal hearing right with negative pressure and a mild conductive hearing loss left with a flat tympanogram. Mother has no concerns with hearing. Speech/language is age appropriate.     OBJECTIVE: Otoscopy revealed clear ear canals bilaterally. Tympanograms revealed normal eardrum mobility bilaterally. Distortion product otoacoustic emissions (DPOAEs) from 2-8kHz were present bilaterally. One person conditioned play audiometry with good reliability showed normal hearing thresholds bilaterally. Unable to keep attention for speech thresholds or word recognition testing.      ASSESSMENT: Normal hearing senstivity bilaterally.     PLAN: Follow up in 6 months to continue monitoring for possible progressive hearing loss.     Maggie Dorsey.  Licensed Audiologist  [place] #[id number]

## 2023-08-16 ENCOUNTER — OFFICE VISIT (OUTPATIENT)
Dept: NEUROLOGY | Facility: CLINIC | Age: 43
End: 2023-08-16
Payer: COMMERCIAL

## 2023-08-16 DIAGNOSIS — G43.011 INTRACTABLE MIGRAINE WITHOUT AURA AND WITH STATUS MIGRAINOSUS: Primary | ICD-10-CM

## 2023-08-16 PROCEDURE — 99214 PR OFFICE/OUTPT VISIT, EST, LEVL IV, 30-39 MIN: ICD-10-PCS | Mod: 95,,, | Performed by: PSYCHIATRY & NEUROLOGY

## 2023-08-16 PROCEDURE — 99214 OFFICE O/P EST MOD 30 MIN: CPT | Mod: 95,,, | Performed by: PSYCHIATRY & NEUROLOGY

## 2023-08-16 RX ORDER — RIMEGEPANT SULFATE 75 MG/75MG
75 TABLET, ORALLY DISINTEGRATING ORAL DAILY PRN
Qty: 12 TABLET | Refills: 2 | Status: SHIPPED | OUTPATIENT
Start: 2023-08-16

## 2023-08-16 RX ORDER — DOXYCYCLINE HYCLATE 100 MG
100 TABLET ORAL 2 TIMES DAILY
Qty: 28 TABLET | Refills: 0 | Status: SHIPPED | OUTPATIENT
Start: 2023-08-16 | End: 2023-08-30

## 2023-08-16 RX ORDER — GALCANEZUMAB 120 MG/ML
120 INJECTION, SOLUTION SUBCUTANEOUS
Qty: 2 EACH | Refills: 0 | Status: SHIPPED | OUTPATIENT
Start: 2023-08-16 | End: 2024-02-23

## 2023-08-16 NOTE — PROGRESS NOTES
[facility] - NEUROLOGY 7TH FL [facility], [place]    Date: 8/16/23  Patient Name: Wanda Singleton   MRN: [id number]   PCP: Jas Gusman  Referring Provider: No ref. provider found      The patient location is: Home  The chief complaint leading to consultation is: Migraine  Visit type: Virtual visit with synchronous audio and video  Total time spent with patient: 30 minutes  Each patient to whom he or she provides medical services by telemedicine is:  (1) informed of the relationship between the physician and patient and the respective role of any other health care provider with respect to management of the patient; and (2) notified that he or she may decline to receive medical services by telemedicine and may withdraw from such care at any time.    Notes:  Assessment:   Wanda Singleton is a 43 y.o. female Presenting in follow-up for management of migraine headache.  Discussed therapeutic options with patient who would like to reinitiate a repeat trial of Emgality.  Nurtec provided for rescue.  All questions answered.  Plan:     Problem List Items Addressed This Visit          Neuro    Intractable migraine without aura and with status migrainosus - Primary    Relevant Medications    galcanezumab-gnlm (EMGALITY PEN) 120 mg/mL PnIj    NURTEC 75 mg odt     I spent a total of 36 minutes preparing to see the patient, obtaining and reviewing history and results, performing a medically appropriate exam, counseling and educating the patient/family/caregiver, documenting clinical information, coordinating care, and ordering medications, tests, procedures, and referrals.    Aryan Meza MD  [facility]   Department of Neurology    Patient note was created using MModal Dictation.  Any errors in syntax or even information may not have been identified and edited on initial review prior to signing this note.  Subjective:   HPI:   Ms. Wanda Singleton is  a 43 y.o. female Presenting in follow-up for management of chronic headache.  Since the patient's last visit, she reports that Emgality only was helpful for approximately 3 weeks.  She switched to ajovy but discontinued this due to side effects of tinnitus, dizziness, and anxiety.  She states she is been dealing with severe tinnitus that is gradually getting better as well as stomach pain and constipation.  She is noted difficulty with brain fog and episodic vertigo that she describes as a sensation of the world spinning around her.  She describes associated nausea, brain fog, and fatigue.  She states she is an upcoming visit with ENT.  She states she is concerned that sinus infections may be contributing to her headaches at least partially.Ultimately she states she would like to repeat a trial of emgality.    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        PAST SURGICAL HISTORY:  Past Surgical History:   Procedure Laterality Date    ETHMOIDECTOMY  7/21/2022    Procedure: ETHMOIDECTOMY;  Surgeon: Delvis Galloway MD;  Location: [address];  Service: ENT;;    FRONTAL SINUS OBLITERATION  7/21/2022    Procedure: SINUSOTOMY, FRONTAL SINUS, OBLITERATIVE;  Surgeon: Delvis Galloway MD;  Location: [facility] OR [address] [place] [address];  Service: ENT;;    FUNCTIONAL ENDOSCOPIC SINUS SURGERY (FESS) USING COMPUTER-ASSISTED NAVIGATION Bilateral 7/21/2022    Procedure: FESS, USING COMPUTER-ASSISTED NAVIGATION;  Surgeon: Delvis Galloway MD;  Location: [facility] OR [address] [place] [address];  Service: ENT;  Laterality: Bilateral;    LAPAROSCOPY      x2 for endometriosis    LEG SURGERY      x8     MAXILLARY ANTROSTOMY  7/21/2022    Procedure: MAXILLARY ANTROSTOMY;  Surgeon: Delvis Galloway MD;  Location: [facility] OR [address] [place] [address];  Service: ENT;;       CURRENT MEDS:  Current Outpatient Medications   Medication Sig Dispense Refill    acetaminophen (TYLENOL) 325 MG tablet Take 2 tablets (650 mg total) by mouth every 6 (six) hours as needed for Pain. Alternate with ibuprofen every 3 hours as needed (Patient not taking: Reported on 5/19/2023) 40 tablet 0    albuterol (PROVENTIL/VENTOLIN HFA) 90 mcg/actuation inhaler Inhale 2 puffs into the lungs every 6 (six) hours as needed.      ascorbic acid, vitamin C, (VITAMIN C) 500 MG tablet Take 500 mg by mouth once daily.      azelastine (ASTELIN) 137 mcg (0.1 %) nasal spray 1 spray (137 mcg total) by Nasal route 2 (two) times daily. 30 mL 3    diphenhydrAMINE (BENADRYL) 25 mg capsule Take 1 capsule by mouth every evening.      DM/pseudoephed/acetaminophen (PSEUDOEPHED-DM-ACETAMINOPHEN ORAL) Take by mouth.      ELDERBERRY FRUIT AND FLOWER ORAL Take 1 tablet by mouth once daily.      famotidine (PEPCID) 20 MG tablet Take 20 mg by mouth 2 (two) times daily.      fluconazole (DIFLUCAN) 150 MG Tab Take 1 tablet (150mg) now, then in 3 days. 2 tablet 0    galcanezumab-gnlm (EMGALITY PEN) 120 mg/mL PnIj Inject 120 mg into the skin every 28 days. NEEDS APT FOR FURTHER REFILL 3 each 0    galcanezumab-gnlm (EMGALITY PEN) 120 mg/mL PnIj Inject 120 mg into the skin every 28 days. Inject 240 mg for first dose ONLY 2 each 0    ginkgo biloba 40 mg Tab Take by mouth.      ibuprofen (ADVIL,MOTRIN) 800 MG tablet Take 1 tablet (800 mg total) by mouth every 6 (six) hours as needed for Pain. Alternate with tylenol every 3 hours as needed. 20 tablet 0    iron 18 mg Tab Take 1 tablet by mouth once daily.      lysine (L-LYSINE) 500 mg Tab Take 1,000 mg by mouth once daily.      melatonin 5 mg Chew Take 1 tablet by mouth every evening.      melatonin-pyridoxal phos, B6, 2.5 mg- 338 mcg Subl Place under  the tongue.      norethindrone (AYGESTIN) 5 mg Tab Take 1 tablet (5 mg total) by mouth once daily. 30 tablet 11    NURTEC 75 mg odt Take 1 tablet (75 mg total) by mouth daily as needed for Migraine. Place ODT tablet on the tongue; alternatively the ODT tablet may be placed under the tongue 12 tablet 2    nystatin-triamcinolone (MYCOLOG II) cream Apply to affected area 2 times daily for 7 days 30 g 1    ondansetron (ZOFRAN-ODT) 8 MG TbDL Dissolve 1 tablet (8 mg total) by mouth every 8 (eight) hours as needed (nausea). 15 tablet 0    prenat.vits,zeenat,min-iron-folic (PRENATAL VITAMIN) Tab Take 1 tablet by mouth once daily.      sumatriptan (IMITREX) 100 MG tablet Take 1 tablet (100 mg total) by mouth daily as needed for Migraine. 12 tablet 3    VITAMIN B COMPLEX ORAL Take 1 tablet by mouth once daily.      vitamin D (VITAMIN D3) 1000 units Tab Take 1,000 Units by mouth once daily.       No current facility-administered medications for this visit.     Facility-Administered Medications Ordered in Other Visits   Medication Dose Route Frequency Provider Last Rate Last Admin    haloperidol lactate injection 0.5 mg  0.5 mg Intravenous Q10 Min PRN Popeye Phelps MD           ALLERGIES:  Review of patient's allergies indicates:   Allergen Reactions    Adhesive Blisters, Hives, Itching, Rash and Swelling    Bee sting kit Hives, Itching, Nausea Only and Swelling    Fluticasone Anxiety, Other (See Comments), Palpitations and Shortness Of Breath    Guaifenesin Anxiety, Hallucinations, Palpitations and Shortness Of Breath    Horseradish root extract Anaphylaxis, Hives, Nausea And Vomiting, Palpitations, Shortness Of Breath and Swelling    Bleach (sodium hypochlorite)     Horseradish     Levofloxacin     Oxycodone Hives and Itching    Latex, natural rubber Hives, Itching and Rash    Penicillins Hives and Nausea And Vomiting    Sulfa (sulfonamide antibiotics) Anxiety, Diarrhea, Hives, Itching, Nausea And Vomiting and Palpitations        FAMILY HISTORY:  Family History   Problem Relation Age of Onset    Breast cancer Neg Hx     Colon cancer Neg Hx     Ovarian cancer Neg Hx        SOCIAL HISTORY:  Social History     Tobacco Use    Smoking status: Every Day     Current packs/day: 0.50     Types: Cigarettes    Smokeless tobacco: Never   Substance Use Topics    Alcohol use: Not Currently    Drug use: Not Currently       Review of Systems:  12 system review of systems is negative except for the symptoms mentioned in HPI.      Objective:     General: NAD, well nourished   Eyes: no tearing, discharge, no erythema   ENT: moist mucous membranes of the oral cavity, nares patent    Neck: Supple, full range of motion  Cardiovascular: Appears well perfused  Lungs: Normal work of breathing, normal chest wall excursions  Skin: No rash, lesions, or breakdown on exposed skin  Psychiatry: Mood and affect are appropriate   Abdomen: nondistended, non tender  Extremeties: No cyanosis, clubbing or edema visible    Neurological   MENTAL STATUS: Alert and oriented to person, place, and time. Attention and concentration within normal limits. Speech without dysarthria. Recent and remote memory within normal limits   CRANIAL NERVES: Visual fields intact. PERRL. EOMI. Facial sensation intact. Face symmetrical. Hearing grossly intact. Full shoulder shrug bilaterally. Tongue protrudes midline   SENSORY: Sensation is intact to light touch throughout.   MOTOR: Normal bulk.  Moves all extremities symmetrically.  CEREBELLAR/COORDINATION/GAIT: Gait steady. Finger to nose intact. Normal rapid alternating movements.

## 2023-09-01 ENCOUNTER — PATIENT MESSAGE (OUTPATIENT)
Dept: OBSTETRICS AND GYNECOLOGY | Facility: CLINIC | Age: 43
End: 2023-09-01
Payer: COMMERCIAL

## 2023-09-01 RX ORDER — FLUCONAZOLE 150 MG/1
TABLET ORAL
Qty: 2 TABLET | Refills: 0 | Status: SHIPPED | OUTPATIENT
Start: 2023-09-01 | End: 2024-01-02 | Stop reason: SDUPTHER

## 2023-10-06 ENCOUNTER — OFFICE VISIT (OUTPATIENT)
Dept: OTOLARYNGOLOGY | Facility: CLINIC | Age: 43
End: 2023-10-06
Payer: COMMERCIAL

## 2023-10-06 VITALS
BODY MASS INDEX: 21.83 KG/M2 | DIASTOLIC BLOOD PRESSURE: 80 MMHG | WEIGHT: 123.25 LBS | SYSTOLIC BLOOD PRESSURE: 120 MMHG | HEART RATE: 79 BPM

## 2023-10-06 DIAGNOSIS — J34.89 SINUS PRESSURE: ICD-10-CM

## 2023-10-06 DIAGNOSIS — Z98.890 STATUS POST FUNCTIONAL ENDOSCOPIC SINUS SURGERY (FESS): ICD-10-CM

## 2023-10-06 DIAGNOSIS — J30.9 ALLERGIC RHINITIS, UNSPECIFIED SEASONALITY, UNSPECIFIED TRIGGER: ICD-10-CM

## 2023-10-06 DIAGNOSIS — J32.9 CHRONIC SINUSITIS, UNSPECIFIED LOCATION: Primary | ICD-10-CM

## 2023-10-06 DIAGNOSIS — J34.89 OTHER SPECIFIED DISORDERS OF NOSE AND NASAL SINUSES: ICD-10-CM

## 2023-10-06 PROCEDURE — 1160F PR REVIEW ALL MEDS BY PRESCRIBER/CLIN PHARMACIST DOCUMENTED: ICD-10-PCS | Mod: CPTII,S$GLB,, | Performed by: STUDENT IN AN ORGANIZED HEALTH CARE EDUCATION/TRAINING PROGRAM

## 2023-10-06 PROCEDURE — 3079F DIAST BP 80-89 MM HG: CPT | Mod: CPTII,S$GLB,, | Performed by: STUDENT IN AN ORGANIZED HEALTH CARE EDUCATION/TRAINING PROGRAM

## 2023-10-06 PROCEDURE — 1159F PR MEDICATION LIST DOCUMENTED IN MEDICAL RECORD: ICD-10-PCS | Mod: CPTII,S$GLB,, | Performed by: STUDENT IN AN ORGANIZED HEALTH CARE EDUCATION/TRAINING PROGRAM

## 2023-10-06 PROCEDURE — 3008F BODY MASS INDEX DOCD: CPT | Mod: CPTII,S$GLB,, | Performed by: STUDENT IN AN ORGANIZED HEALTH CARE EDUCATION/TRAINING PROGRAM

## 2023-10-06 PROCEDURE — 99213 PR OFFICE/OUTPT VISIT, EST, LEVL III, 20-29 MIN: ICD-10-PCS | Mod: 25,S$GLB,, | Performed by: STUDENT IN AN ORGANIZED HEALTH CARE EDUCATION/TRAINING PROGRAM

## 2023-10-06 PROCEDURE — 3074F SYST BP LT 130 MM HG: CPT | Mod: CPTII,S$GLB,, | Performed by: STUDENT IN AN ORGANIZED HEALTH CARE EDUCATION/TRAINING PROGRAM

## 2023-10-06 PROCEDURE — 3079F PR MOST RECENT DIASTOLIC BLOOD PRESSURE 80-89 MM HG: ICD-10-PCS | Mod: CPTII,S$GLB,, | Performed by: STUDENT IN AN ORGANIZED HEALTH CARE EDUCATION/TRAINING PROGRAM

## 2023-10-06 PROCEDURE — 3008F PR BODY MASS INDEX (BMI) DOCUMENTED: ICD-10-PCS | Mod: CPTII,S$GLB,, | Performed by: STUDENT IN AN ORGANIZED HEALTH CARE EDUCATION/TRAINING PROGRAM

## 2023-10-06 PROCEDURE — 3074F PR MOST RECENT SYSTOLIC BLOOD PRESSURE < 130 MM HG: ICD-10-PCS | Mod: CPTII,S$GLB,, | Performed by: STUDENT IN AN ORGANIZED HEALTH CARE EDUCATION/TRAINING PROGRAM

## 2023-10-06 PROCEDURE — 99999 PR PBB SHADOW E&M-EST. PATIENT-LVL V: CPT | Mod: PBBFAC,,, | Performed by: STUDENT IN AN ORGANIZED HEALTH CARE EDUCATION/TRAINING PROGRAM

## 2023-10-06 PROCEDURE — 99999 PR PBB SHADOW E&M-EST. PATIENT-LVL V: ICD-10-PCS | Mod: PBBFAC,,, | Performed by: STUDENT IN AN ORGANIZED HEALTH CARE EDUCATION/TRAINING PROGRAM

## 2023-10-06 PROCEDURE — 1160F RVW MEDS BY RX/DR IN RCRD: CPT | Mod: CPTII,S$GLB,, | Performed by: STUDENT IN AN ORGANIZED HEALTH CARE EDUCATION/TRAINING PROGRAM

## 2023-10-06 PROCEDURE — 99213 OFFICE O/P EST LOW 20 MIN: CPT | Mod: 25,S$GLB,, | Performed by: STUDENT IN AN ORGANIZED HEALTH CARE EDUCATION/TRAINING PROGRAM

## 2023-10-06 PROCEDURE — 1159F MED LIST DOCD IN RCRD: CPT | Mod: CPTII,S$GLB,, | Performed by: STUDENT IN AN ORGANIZED HEALTH CARE EDUCATION/TRAINING PROGRAM

## 2023-10-06 NOTE — PROGRESS NOTES
Subjective:      Wanda is a 43 y.o. female who comes for follow-up of sinusitis.  Her last visit with me was on 5/19/2023. Continues to have facial pressure bilaterally. Will get occasionally will have drainage from her nose. Still feels that she has congestion. Head aches still persist.     Her current sinus regime consists of: Budesonide irrigations.     The assessment of quality and severity of symptoms as measured by the SNOT-22 score was deferred.    The patient's medications, allergies, past medical, surgical, social and family histories were reviewed and updated as appropriate.    Review of Systems   Constitutional:  Positive for malaise/fatigue.   HENT:  Positive for ear pain.    Eyes: Negative.    Respiratory: Negative.     Gastrointestinal: Negative.    Genitourinary: Negative.    Musculoskeletal:  Positive for neck pain.   Skin: Negative.    Neurological:  Positive for dizziness and headaches.   Psychiatric/Behavioral:  Positive for depression. The patient is nervous/anxious.       Answers submitted by the patient for this visit:  Review of Symptoms Questionnaire  (Submitted on 10/4/2023)  sinus pressure : Yes  Irregular heartbeat?: Yes  Food Allergies?: Yes  Seasonal Allergies?: Yes  Cold all of the time? : Yes  Light-headedness: Yes  swollen glands: Yes  decreased concentration: Yes  sleep disturbance: Yes    A detailed review of systems was obtained with pertinent positives as per the above HPI, and otherwise negative.        Objective:     /80 (Patient Position: Sitting)   Pulse 79   Wt 55.9 kg (123 lb 3.8 oz)   BMI 21.83 kg/m²        Constitutional:   Vital signs are normal. She appears well-developed. Normal speech.      Head:  Normocephalic and atraumatic.     Ears:    Right Ear: No drainage or tenderness. No middle ear effusion.   Left Ear: No drainage or tenderness.  No middle ear effusion.     Mouth/Throat  Oropharynx clear and moist without lesions or asymmetry and normal uvula  midline. No trismus. No oropharyngeal exudate. Mirror exam not performed due to patient tolerance.  Mirror exam not performed due to patient tolerance.      Neck:  Neck normal without thyromegaly masses, asymmetry, normal tracheal structure, crepitus, and tenderness, thyroid normal and trachea normal.     Pulmonary/Chest:   Effort normal.     Psychiatric:   She has a normal mood and affect. Her speech is normal.     Skin:   No abrasions, lacerations, lesions, or rashes.       Procedure    Nasal endoscopy performed.  See procedure note.    Nasal Endoscopy:  10/6/2023    The use of diagnostic nasal endoscopy was considered medically necessary for the evaluation and visualization of the nasal anatomy for symptoms suggestive of nasal or sinus origin. Physical examination (including a nasal speculum evaluation) did not provide sufficient clinical information to establish a diagnosis, or symptoms did not improve or worsened following treatment.     The nasal cavity was decongested with topical 1% phenylephrine and anesthetized with 4% lidocaine.  A rigid 0-degree endoscope was introduced into the nasal cavity.    The patient was seated in the examination chair. After discussion of risks and benefits, a nasal endoscope was inserted into the nose the endoscope was passed along the left nasal floor to the nasopharynx. It was then passed between the middle and superior meatus, nasal turbinates, nasal septum, nasopharynx and sphenoethmoid region. The nasal endoscope was withdrawn and there was no complications. An identical procedure was performed on the right side. I was present for the entire procedure.The patient tolerated the above procedure well. The findings of this procedure can be found in the dictated note from 10/6/2023 visit.      Ethmoid cavities patient, no scarring. MT well medialized bilaterally. No purulence in ethmoid cavities. Fontal maxillary and sphenoid cavities patient. No purulence. Sinuses very well  "aerated.     Data Reviewed    WBC (K/uL)   Date Value   10/07/2022 6.80     Eosinophil % (%)   Date Value   10/07/2022 0.6     Eos # (K/uL)   Date Value   10/07/2022 0.0     Platelets (K/uL)   Date Value   10/07/2022 235     Glucose (mg/dL)   Date Value   10/26/2021 87     No results found for: "IGE"    No sinus imaging available.      Assessment:     1. Chronic sinusitis, unspecified location    2. Other specified disorders of nose and nasal sinuses    3. Status post functional endoscopic sinus surgery (FESS)    4. Allergic rhinitis, unspecified seasonality, unspecified trigger    5. Sinus pressure      Plan:     - Cont budesonide  - Start Xylitol rinses  - F.u in 3 mos with CT    Delvis Galloway MD     "

## 2023-10-17 ENCOUNTER — PATIENT MESSAGE (OUTPATIENT)
Dept: NEUROLOGY | Facility: CLINIC | Age: 43
End: 2023-10-17

## 2023-10-17 ENCOUNTER — OFFICE VISIT (OUTPATIENT)
Dept: NEUROLOGY | Facility: CLINIC | Age: 43
End: 2023-10-17
Payer: COMMERCIAL

## 2023-10-17 DIAGNOSIS — G43.011 INTRACTABLE MIGRAINE WITHOUT AURA AND WITH STATUS MIGRAINOSUS: Primary | ICD-10-CM

## 2023-10-17 DIAGNOSIS — G43.011 INTRACTABLE MIGRAINE WITHOUT AURA AND WITH STATUS MIGRAINOSUS: ICD-10-CM

## 2023-10-17 PROCEDURE — 99999 PR PBB SHADOW E&M-EST. PATIENT-LVL I: ICD-10-PCS | Mod: PBBFAC,,, | Performed by: PSYCHIATRY & NEUROLOGY

## 2023-10-17 PROCEDURE — 99999 PR PBB SHADOW E&M-EST. PATIENT-LVL I: CPT | Mod: PBBFAC,,, | Performed by: PSYCHIATRY & NEUROLOGY

## 2023-10-17 PROCEDURE — 99215 PR OFFICE/OUTPT VISIT, EST, LEVL V, 40-54 MIN: ICD-10-PCS | Mod: S$GLB,,, | Performed by: PSYCHIATRY & NEUROLOGY

## 2023-10-17 PROCEDURE — 99215 OFFICE O/P EST HI 40 MIN: CPT | Mod: S$GLB,,, | Performed by: PSYCHIATRY & NEUROLOGY

## 2023-10-17 RX ORDER — TOPIRAMATE 50 MG/1
TABLET, FILM COATED ORAL
Qty: 90 TABLET | Refills: 3 | Status: SHIPPED | OUTPATIENT
Start: 2023-10-17 | End: 2023-10-17 | Stop reason: SDUPTHER

## 2023-10-17 RX ORDER — TOPIRAMATE 50 MG/1
TABLET, FILM COATED ORAL
Qty: 90 TABLET | Refills: 3 | Status: SHIPPED | OUTPATIENT
Start: 2023-10-17 | End: 2024-02-23

## 2023-10-17 NOTE — PROGRESS NOTES
[facility] - NEUROLOGY [address] [facility], [place]    Date: 10/17/23  Patient Name: Wanda Singleton   MRN: [id number]   PCP: Jas Gusman  Referring Provider: No ref. provider found    Assessment:   Wanda Singleton is a 43 y.o. female Presenting in follow-up for management of migraine.  Migraine improved with persistent sensations of vertigo and head pressure.  Patient amenable to trial of low-dose Topamax.  At present no red flags for IH.  Patient following with Neurosurgery for a known pineal cyst with upcoming repeat imaging.  Further workup pending results.    Plan:     Problem List Items Addressed This Visit          Neuro    Intractable migraine without aura and with status migrainosus - Primary    Relevant Medications    topiramate (TOPAMAX) 50 MG tablet     I spent a total of 40 minutes preparing to see the patient, obtaining and reviewing history and results, performing a medically appropriate exam, counseling and educating the patient/family/caregiver, documenting clinical information, coordinating care, and ordering medications, tests, procedures, and referrals.      Aryan Meza MD  [facility]   Department of Neurology    Patient note was created using MModal Dictation.  Any errors in syntax or even information may not have been identified and edited on initial review prior to signing this note.  Subjective:      HPI:   Ms. Wanda Singleton is a 43 y.o. female presenting in follow up for management of migraine.  That since her last visit her migraine severity has improved dramatically.  She states she no longer experiences migrainous head pain, photophobia, phonophobia, or nausea.  She does however describe a continuous whole cephalic sensation of pressure that does not change with position or Valsalva.  She denies any vision change or loss and states a recent eye exam was unremarkable.  She states she is scheduled to follow up  with Neurosurgery in December for repeat imaging for eval of her known pineal cyst (personally reviewed).  She states that she is intermittently vertiginous without clear trigger and states she has been following ENT for management of this.    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        PAST SURGICAL HISTORY:  Past Surgical History:   Procedure Laterality Date    ETHMOIDECTOMY  7/21/2022    Procedure: ETHMOIDECTOMY;  Surgeon: Delvis Galloway MD;  Location: [facility] OR [address] [place] [address];  Service: ENT;;    FRONTAL SINUS OBLITERATION  7/21/2022    Procedure: SINUSOTOMY, FRONTAL SINUS, OBLITERATIVE;  Surgeon: Delvis Galloway MD;  Location: [facility] OR [address] [place] [address];  Service: ENT;;    FUNCTIONAL ENDOSCOPIC SINUS SURGERY (FESS) USING COMPUTER-ASSISTED NAVIGATION Bilateral 7/21/2022    Procedure: FESS, USING COMPUTER-ASSISTED NAVIGATION;  Surgeon: Delvis Galloway MD;  Location: [facility] OR [address] [place] [address];  Service: ENT;  Laterality: Bilateral;    LAPAROSCOPY      x2 for endometriosis    LEG SURGERY      x8    MAXILLARY ANTROSTOMY  7/21/2022    Procedure: MAXILLARY ANTROSTOMY;  Surgeon: Delvis Galloway MD;  Location: [facility] OR [address] [place] [address];  Service: ENT;;       CURRENT MEDS:  Current Outpatient Medications   Medication Sig Dispense Refill    acetaminophen (TYLENOL) 325 MG tablet Take 2 tablets (650 mg total) by mouth every 6 (six) hours as needed for Pain. Alternate with ibuprofen every 3 hours as needed (Patient not taking: Reported on 5/19/2023) 40 tablet 0    albuterol (PROVENTIL/VENTOLIN HFA) 90 mcg/actuation inhaler Inhale 2 puffs into the lungs every 6 (six) hours as needed.       ascorbic acid, vitamin C, (VITAMIN C) 500 MG tablet Take 500 mg by mouth once daily.      azelastine (ASTELIN) 137 mcg (0.1 %) nasal spray 1 spray (137 mcg total) by Nasal route 2 (two) times daily. 30 mL 3    diphenhydrAMINE (BENADRYL) 25 mg capsule Take 1 capsule by mouth every evening.      DM/pseudoephed/acetaminophen (PSEUDOEPHED-DM-ACETAMINOPHEN ORAL) Take by mouth.      ELDERBERRY FRUIT AND FLOWER ORAL Take 1 tablet by mouth once daily.      famotidine (PEPCID) 20 MG tablet Take 20 mg by mouth 2 (two) times daily.      fluconazole (DIFLUCAN) 150 MG Tab Take 1 tablet (150mg) now, then in 3 days. 2 tablet 0    galcanezumab-gnlm (EMGALITY PEN) 120 mg/mL PnIj Inject 120 mg into the skin every 28 days. NEEDS APT FOR FURTHER REFILL 3 each 0    galcanezumab-gnlm (EMGALITY PEN) 120 mg/mL PnIj Inject 120 mg into the skin every 28 days. Inject 240 mg for first dose ONLY 2 each 0    ginkgo biloba 40 mg Tab Take by mouth.      ibuprofen (ADVIL,MOTRIN) 800 MG tablet Take 1 tablet (800 mg total) by mouth every 6 (six) hours as needed for Pain. Alternate with tylenol every 3 hours as needed. 20 tablet 0    iron 18 mg Tab Take 1 tablet by mouth once daily.      lysine (L-LYSINE) 500 mg Tab Take 1,000 mg by mouth once daily.      melatonin 5 mg Chew Take 1 tablet by mouth every evening.      melatonin-pyridoxal phos, B6, 2.5 mg- 338 mcg Subl Place under the tongue.      norethindrone (AYGESTIN) 5 mg Tab Take 1 tablet (5 mg total) by mouth once daily. 30 tablet 11    NURTEC 75 mg odt Take 1 tablet (75 mg total) by mouth daily as needed for Migraine. Place ODT tablet on the tongue; alternatively the ODT tablet may be placed under the tongue 12 tablet 2    nystatin-triamcinolone (MYCOLOG II) cream Apply to affected area 2 times daily for 7 days 30 g 1    ondansetron (ZOFRAN-ODT) 8 MG TbDL Dissolve 1 tablet (8 mg total) by mouth every 8 (eight) hours as needed (nausea). 15 tablet 0    prenat.vits,zeenat,min-iron-folic (PRENATAL  VITAMIN) Tab Take 1 tablet by mouth once daily.      sumatriptan (IMITREX) 100 MG tablet Take 1 tablet (100 mg total) by mouth daily as needed for Migraine. 12 tablet 3    topiramate (TOPAMAX) 50 MG tablet Take 0.5 tablets (25 mg total) by mouth every evening for 14 days, THEN 1 tablet (50 mg total) every evening. 90 tablet 3    VITAMIN B COMPLEX ORAL Take 1 tablet by mouth once daily.      vitamin D (VITAMIN D3) 1000 units Tab Take 1,000 Units by mouth once daily.       No current facility-administered medications for this visit.     Facility-Administered Medications Ordered in Other Visits   Medication Dose Route Frequency Provider Last Rate Last Admin    haloperidol lactate injection 0.5 mg  0.5 mg Intravenous Q10 Min PRN Popeye Phelps MD           ALLERGIES:  Review of patient's allergies indicates:   Allergen Reactions    Adhesive Blisters, Hives, Itching, Rash and Swelling    Bee sting kit Hives, Itching, Nausea Only and Swelling    Fluticasone Anxiety, Other (See Comments), Palpitations and Shortness Of Breath    Guaifenesin Anxiety, Hallucinations, Palpitations and Shortness Of Breath    Horseradish root extract Anaphylaxis, Hives, Nausea And Vomiting, Palpitations, Shortness Of Breath and Swelling    Bleach (sodium hypochlorite)     Horseradish     Levofloxacin     Oxycodone Hives and Itching    Latex, natural rubber Hives, Itching and Rash    Penicillins Hives and Nausea And Vomiting    Sulfa (sulfonamide antibiotics) Anxiety, Diarrhea, Hives, Itching, Nausea And Vomiting and Palpitations       FAMILY HISTORY:  Family History   Problem Relation Age of Onset    Breast cancer Neg Hx     Colon cancer Neg Hx     Ovarian cancer Neg Hx        SOCIAL HISTORY:  Social History     Tobacco Use    Smoking status: Every Day     Current packs/day: 0.50     Types: Cigarettes    Smokeless tobacco: Never   Substance Use Topics    Alcohol use: Not Currently    Drug use: Not Currently       Review of Systems:  12 system  review of systems is negative except for the symptoms mentioned in HPI.      Objective:   There were no vitals filed for this visit.  General: NAD, well nourished   Eyes: no tearing, discharge, no erythema   ENT: moist mucous membranes of the oral cavity, nares patent    Neck: Supple, full range of motion  Cardiovascular: Warm and well perfused, pulses equal and symmetrical  Lungs: Normal work of breathing, normal chest wall excursions  Skin: No rash, lesions, or breakdown on exposed skin  Psychiatry: Mood and affect are appropriate   Abdomen: soft, non tender, non distended  Extremeties: No cyanosis, clubbing or edema.    Neurological   MENTAL STATUS: Alert and oriented to person, place, and time. Attention and concentration within normal limits. Speech without dysarthria, able to name and repeat without difficulty. Recent and remote memory within normal limits   CRANIAL NERVES: Visual fields intact. PERRL. EOMI. Facial sensation intact. Face symmetrical. Hearing grossly intact. Full shoulder shrug bilaterally. Tongue protrudes midline   SENSORY: Sensation is intact to light touch throughout.    MOTOR: Normal bulk and tone. /5 deltoid, biceps, triceps, interosseous, hand  bilaterally. 5/5 iliopsoas, knee extension/flexion, foot dorsi/plantarflexion bilaterally.    REFLEXES: Symmetric and 2+ throughout.   CEREBELLAR/COORDINATION/GAIT: Gait steady. Finger to nose intact. Normal rapid alternating movements.

## 2023-10-23 ENCOUNTER — PATIENT MESSAGE (OUTPATIENT)
Dept: NEUROLOGY | Facility: CLINIC | Age: 43
End: 2023-10-23
Payer: COMMERCIAL

## 2023-10-25 ENCOUNTER — PATIENT MESSAGE (OUTPATIENT)
Dept: NEUROLOGY | Facility: CLINIC | Age: 43
End: 2023-10-25
Payer: COMMERCIAL

## 2023-11-05 ENCOUNTER — PATIENT MESSAGE (OUTPATIENT)
Dept: NEUROLOGY | Facility: CLINIC | Age: 43
End: 2023-11-05
Payer: COMMERCIAL

## 2023-11-19 ENCOUNTER — PATIENT MESSAGE (OUTPATIENT)
Dept: OTOLARYNGOLOGY | Facility: CLINIC | Age: 43
End: 2023-11-19
Payer: COMMERCIAL

## 2023-11-19 DIAGNOSIS — J32.9 CHRONIC SINUSITIS, UNSPECIFIED LOCATION: Primary | ICD-10-CM

## 2023-11-20 RX ORDER — AZITHROMYCIN 250 MG/1
TABLET, FILM COATED ORAL
Qty: 6 TABLET | Refills: 0 | Status: SHIPPED | OUTPATIENT
Start: 2023-11-20 | End: 2023-11-25

## 2023-12-15 ENCOUNTER — HOSPITAL ENCOUNTER (OUTPATIENT)
Dept: RADIOLOGY | Facility: HOSPITAL | Age: 43
Discharge: HOME | End: 2023-12-15
Attending: NEUROLOGICAL SURGERY
Payer: COMMERCIAL

## 2023-12-15 ENCOUNTER — HOSPITAL ENCOUNTER (OUTPATIENT)
Dept: RADIOLOGY | Facility: HOSPITAL | Age: 43
Discharge: HOME | End: 2023-12-15
Attending: STUDENT IN AN ORGANIZED HEALTH CARE EDUCATION/TRAINING PROGRAM
Payer: COMMERCIAL

## 2023-12-15 DIAGNOSIS — E23.6 PITUITARY CYST: ICD-10-CM

## 2023-12-15 DIAGNOSIS — J34.89 OTHER SPECIFIED DISORDERS OF NOSE AND NASAL SINUSES: ICD-10-CM

## 2023-12-15 PROCEDURE — 70553 MRI PITUITARY W W/O CONTRAST: ICD-10-PCS | Mod: 26,,, | Performed by: RADIOLOGY

## 2023-12-15 PROCEDURE — 70486 CT MAXILLOFACIAL W/O DYE: CPT | Mod: 26,,, | Performed by: RADIOLOGY

## 2023-12-15 PROCEDURE — 70486 CT MEDTRONIC SINUSES WITHOUT: ICD-10-PCS | Mod: 26,,, | Performed by: RADIOLOGY

## 2023-12-15 PROCEDURE — 70553 MRI BRAIN STEM W/O & W/DYE: CPT | Mod: TC

## 2023-12-15 PROCEDURE — 70486 CT MAXILLOFACIAL W/O DYE: CPT | Mod: TC

## 2023-12-15 PROCEDURE — 70553 MRI BRAIN STEM W/O & W/DYE: CPT | Mod: 26,,, | Performed by: RADIOLOGY

## 2023-12-15 PROCEDURE — 25500020 PHARM REV CODE 255: Performed by: NEUROLOGICAL SURGERY

## 2023-12-15 PROCEDURE — A9585 GADOBUTROL INJECTION: HCPCS | Performed by: NEUROLOGICAL SURGERY

## 2023-12-15 RX ORDER — GADOBUTROL 604.72 MG/ML
3 INJECTION INTRAVENOUS
Status: COMPLETED | OUTPATIENT
Start: 2023-12-15 | End: 2023-12-15

## 2023-12-15 RX ORDER — GADOBUTROL 604.72 MG/ML
6 INJECTION INTRAVENOUS
Status: DISCONTINUED | OUTPATIENT
Start: 2023-12-15 | End: 2023-12-15 | Stop reason: CLARIF

## 2023-12-15 RX ADMIN — GADOBUTROL 3 ML: 604.72 INJECTION INTRAVENOUS at 05:12

## 2023-12-22 ENCOUNTER — OFFICE VISIT (OUTPATIENT)
Dept: OTOLARYNGOLOGY | Facility: CLINIC | Age: 43
End: 2023-12-22
Payer: COMMERCIAL

## 2023-12-22 VITALS
BODY MASS INDEX: 21.56 KG/M2 | HEIGHT: 63 IN | WEIGHT: 121.69 LBS | SYSTOLIC BLOOD PRESSURE: 109 MMHG | HEART RATE: 76 BPM | DIASTOLIC BLOOD PRESSURE: 70 MMHG

## 2023-12-22 DIAGNOSIS — Z98.890 STATUS POST FUNCTIONAL ENDOSCOPIC SINUS SURGERY (FESS): ICD-10-CM

## 2023-12-22 DIAGNOSIS — G89.29 CHRONIC NONINTRACTABLE HEADACHE, UNSPECIFIED HEADACHE TYPE: ICD-10-CM

## 2023-12-22 DIAGNOSIS — J32.9 CHRONIC SINUSITIS, UNSPECIFIED LOCATION: Primary | ICD-10-CM

## 2023-12-22 DIAGNOSIS — R51.9 CHRONIC NONINTRACTABLE HEADACHE, UNSPECIFIED HEADACHE TYPE: ICD-10-CM

## 2023-12-22 PROCEDURE — 3074F SYST BP LT 130 MM HG: CPT | Mod: CPTII,S$GLB,, | Performed by: STUDENT IN AN ORGANIZED HEALTH CARE EDUCATION/TRAINING PROGRAM

## 2023-12-22 PROCEDURE — 3074F PR MOST RECENT SYSTOLIC BLOOD PRESSURE < 130 MM HG: ICD-10-PCS | Mod: CPTII,S$GLB,, | Performed by: STUDENT IN AN ORGANIZED HEALTH CARE EDUCATION/TRAINING PROGRAM

## 2023-12-22 PROCEDURE — 3008F BODY MASS INDEX DOCD: CPT | Mod: CPTII,S$GLB,, | Performed by: STUDENT IN AN ORGANIZED HEALTH CARE EDUCATION/TRAINING PROGRAM

## 2023-12-22 PROCEDURE — 3078F DIAST BP <80 MM HG: CPT | Mod: CPTII,S$GLB,, | Performed by: STUDENT IN AN ORGANIZED HEALTH CARE EDUCATION/TRAINING PROGRAM

## 2023-12-22 PROCEDURE — 99999 PR PBB SHADOW E&M-EST. PATIENT-LVL IV: ICD-10-PCS | Mod: PBBFAC,,, | Performed by: STUDENT IN AN ORGANIZED HEALTH CARE EDUCATION/TRAINING PROGRAM

## 2023-12-22 PROCEDURE — 99213 PR OFFICE/OUTPT VISIT, EST, LEVL III, 20-29 MIN: ICD-10-PCS | Mod: 25,S$GLB,, | Performed by: STUDENT IN AN ORGANIZED HEALTH CARE EDUCATION/TRAINING PROGRAM

## 2023-12-22 PROCEDURE — 1160F PR REVIEW ALL MEDS BY PRESCRIBER/CLIN PHARMACIST DOCUMENTED: ICD-10-PCS | Mod: CPTII,S$GLB,, | Performed by: STUDENT IN AN ORGANIZED HEALTH CARE EDUCATION/TRAINING PROGRAM

## 2023-12-22 PROCEDURE — 31231 PR NASAL ENDOSCOPY, DX: ICD-10-PCS | Mod: S$GLB,,, | Performed by: STUDENT IN AN ORGANIZED HEALTH CARE EDUCATION/TRAINING PROGRAM

## 2023-12-22 PROCEDURE — 1159F PR MEDICATION LIST DOCUMENTED IN MEDICAL RECORD: ICD-10-PCS | Mod: CPTII,S$GLB,, | Performed by: STUDENT IN AN ORGANIZED HEALTH CARE EDUCATION/TRAINING PROGRAM

## 2023-12-22 PROCEDURE — 31231 NASAL ENDOSCOPY DX: CPT | Mod: S$GLB,,, | Performed by: STUDENT IN AN ORGANIZED HEALTH CARE EDUCATION/TRAINING PROGRAM

## 2023-12-22 PROCEDURE — 1160F RVW MEDS BY RX/DR IN RCRD: CPT | Mod: CPTII,S$GLB,, | Performed by: STUDENT IN AN ORGANIZED HEALTH CARE EDUCATION/TRAINING PROGRAM

## 2023-12-22 PROCEDURE — 99999 PR PBB SHADOW E&M-EST. PATIENT-LVL IV: CPT | Mod: PBBFAC,,, | Performed by: STUDENT IN AN ORGANIZED HEALTH CARE EDUCATION/TRAINING PROGRAM

## 2023-12-22 PROCEDURE — 3078F PR MOST RECENT DIASTOLIC BLOOD PRESSURE < 80 MM HG: ICD-10-PCS | Mod: CPTII,S$GLB,, | Performed by: STUDENT IN AN ORGANIZED HEALTH CARE EDUCATION/TRAINING PROGRAM

## 2023-12-22 PROCEDURE — 1159F MED LIST DOCD IN RCRD: CPT | Mod: CPTII,S$GLB,, | Performed by: STUDENT IN AN ORGANIZED HEALTH CARE EDUCATION/TRAINING PROGRAM

## 2023-12-22 PROCEDURE — 3008F PR BODY MASS INDEX (BMI) DOCUMENTED: ICD-10-PCS | Mod: CPTII,S$GLB,, | Performed by: STUDENT IN AN ORGANIZED HEALTH CARE EDUCATION/TRAINING PROGRAM

## 2023-12-22 PROCEDURE — 99213 OFFICE O/P EST LOW 20 MIN: CPT | Mod: 25,S$GLB,, | Performed by: STUDENT IN AN ORGANIZED HEALTH CARE EDUCATION/TRAINING PROGRAM

## 2023-12-22 NOTE — PROGRESS NOTES
"    Subjective:      Wanda is a 43 y.o. female who comes for follow-up of sinusitis.  Her last visit with me was on 10/6/2023.  Today she feels some right-sided facial and nasal congestion over the last 2 weeks.  Interestingly she had procedure done where they performed upper maxillary nerve blocks.  She states after the block and of the next couple of days her sinuses feel completely normal and she had not have any headaches.    Her current sinus regime consists of: Nasal saline & budesonide.     The assessment of quality and severity of symptoms as measured by the SNOT-22 score is deferred.    The patient's medications, allergies, past medical, surgical, social and family histories were reviewed and updated as appropriate.    Review of Systems   Constitutional:  Positive for malaise/fatigue.   HENT:  Positive for nosebleeds.    Eyes: Negative.    Respiratory: Negative.     Cardiovascular: Negative.    Gastrointestinal: Negative.    Genitourinary: Negative.    Musculoskeletal:  Positive for neck pain.   Skin: Negative.    Neurological:  Positive for dizziness and headaches.   Psychiatric/Behavioral:  Positive for depression. The patient is nervous/anxious.       Answers submitted by the patient for this visit:  Review of Symptoms Questionnaire  (Submitted on 12/20/2023)  sinus pressure : Yes  Tooth/Dental Problems?: Yes  Food Allergies?: Yes  Seasonal Allergies?: Yes  Cold all of the time? : Yes  Light-headedness: Yes  None of these: Yes  sleep disturbance: Yes    A detailed review of systems was obtained with pertinent positives as per the above HPI, and otherwise negative.        Objective:     /70 (BP Location: Right arm, Patient Position: Sitting, BP Method: Medium (Automatic))   Pulse 76   Ht 5' 3" (1.6 m)   Wt 55.2 kg (121 lb 11.1 oz)   BMI 21.56 kg/m²        Constitutional:   Vital signs are normal. She appears well-developed. Normal speech.      Head:  Normocephalic and atraumatic. "     Ears:    Right Ear: No drainage or tenderness. No middle ear effusion.   Left Ear: No drainage or tenderness.  No middle ear effusion.     Mouth/Throat  Oropharynx clear and moist without lesions or asymmetry and normal uvula midline. No trismus. No oropharyngeal exudate. Mirror exam not performed due to patient tolerance.  Mirror exam not performed due to patient tolerance.      Neck:  Neck normal without thyromegaly masses, asymmetry, normal tracheal structure, crepitus, and tenderness, thyroid normal and trachea normal.     Pulmonary/Chest:   Effort normal.     Psychiatric:   She has a normal mood and affect. Her speech is normal.     Skin:   No abrasions, lacerations, lesions, or rashes.       Procedure    Nasal endoscopy performed.  See procedure note.    Nasal Endoscopy:  12/22/2023    The use of diagnostic nasal endoscopy was considered medically necessary for the evaluation and visualization of the nasal anatomy for symptoms suggestive of nasal or sinus origin. Physical examination (including a nasal speculum evaluation) did not provide sufficient clinical information to establish a diagnosis, or symptoms did not improve or worsened following treatment.     The nasal cavity was decongested with topical 1% phenylephrine and anesthetized with 4% lidocaine.  A rigid 0-degree endoscope was introduced into the nasal cavity.    The patient was seated in the examination chair. After discussion of risks and benefits, a nasal endoscope was inserted into the nose the endoscope was passed along the left nasal floor to the nasopharynx. It was then passed between the middle and superior meatus, nasal turbinates, nasal septum, nasopharynx and sphenoethmoid region. The nasal endoscope was withdrawn and there was no complications. An identical procedure was performed on the right side. I was present for the entire procedure.The patient tolerated the above procedure well. The findings of this procedure can be found in  "the dictated note from 12/22/2023 visit.                                  Widely patent sinuses.  Bilateral ethmoid cavity opens nicely into the sphenoid sinuses bilaterally.  Frontal sinuses widely patent bilaterally.  She does have edema swelling and a scant amount of purulent drainage within the right maxillary sinus.  Cultures were taken and signs was cleared of any obvious debris.    Data Reviewed    WBC (K/uL)   Date Value   10/07/2022 6.80     Eosinophil % (%)   Date Value   10/07/2022 0.6     Eos # (K/uL)   Date Value   10/07/2022 0.0     Platelets (K/uL)   Date Value   10/07/2022 235     Glucose (mg/dL)   Date Value   10/26/2021 87     No results found for: "IGE"    I independently reviewed the images of the CT sinuses dated 12/15/23. Pertinent findings include midline nasal septum. Widely patient sinuses except some mild mucosal thickening of the right maxillary sinus.    Assessment:     1. Chronic sinusitis, unspecified location    2. Status post functional endoscopic sinus surgery (FESS)    3. Chronic nonintractable headache, unspecified headache type      Plan:     - Cont sinus irrigations.  - Will start on topical abx from [facility] based on cultures.   - Strongly encouraged neurology workup for her neurogenic origin of sinus pressure.    Delvis Galloway MD     "

## 2023-12-26 ENCOUNTER — PATIENT MESSAGE (OUTPATIENT)
Dept: OTOLARYNGOLOGY | Facility: CLINIC | Age: 43
End: 2023-12-26
Payer: COMMERCIAL

## 2023-12-26 PROCEDURE — 87186 SC STD MICRODIL/AGAR DIL: CPT | Mod: 59 | Performed by: STUDENT IN AN ORGANIZED HEALTH CARE EDUCATION/TRAINING PROGRAM

## 2023-12-26 PROCEDURE — 87075 CULTR BACTERIA EXCEPT BLOOD: CPT | Performed by: STUDENT IN AN ORGANIZED HEALTH CARE EDUCATION/TRAINING PROGRAM

## 2023-12-26 PROCEDURE — 87077 CULTURE AEROBIC IDENTIFY: CPT | Mod: 59 | Performed by: STUDENT IN AN ORGANIZED HEALTH CARE EDUCATION/TRAINING PROGRAM

## 2023-12-26 PROCEDURE — 87070 CULTURE OTHR SPECIMN AEROBIC: CPT | Performed by: STUDENT IN AN ORGANIZED HEALTH CARE EDUCATION/TRAINING PROGRAM

## 2023-12-28 ENCOUNTER — PATIENT MESSAGE (OUTPATIENT)
Dept: OTOLARYNGOLOGY | Facility: HOSPITAL | Age: 43
End: 2023-12-28
Payer: COMMERCIAL

## 2023-12-28 DIAGNOSIS — J32.9 CHRONIC SINUSITIS, UNSPECIFIED LOCATION: Primary | ICD-10-CM

## 2023-12-28 RX ORDER — CIPROFLOXACIN 500 MG/1
500 TABLET ORAL EVERY 12 HOURS
Qty: 20 TABLET | Refills: 0 | Status: SHIPPED | OUTPATIENT
Start: 2023-12-28 | End: 2024-01-07

## 2023-12-28 NOTE — TELEPHONE ENCOUNTER
Called to discuss results.     Pt had allergy to levquin with rash/hives. Has never has Cipro. Will trial Cipro. Discussed to stop taking if any rashes appear. She will take benadryl with cipro    Rx fro [facility] faxed today.     Delvis Galloway MD

## 2023-12-29 LAB
BACTERIA SPEC AEROBE CULT: ABNORMAL

## 2024-01-02 LAB — BACTERIA SPEC ANAEROBE CULT: NORMAL

## 2024-01-02 RX ORDER — FLUCONAZOLE 150 MG/1
TABLET ORAL
Qty: 2 TABLET | Refills: 0 | Status: SHIPPED | OUTPATIENT
Start: 2024-01-02

## 2024-01-28 ENCOUNTER — PATIENT MESSAGE (OUTPATIENT)
Dept: NEUROSURGERY | Facility: CLINIC | Age: 44
End: 2024-01-28
Payer: COMMERCIAL

## 2024-02-06 ENCOUNTER — OFFICE VISIT (OUTPATIENT)
Dept: NEUROSURGERY | Facility: CLINIC | Age: 44
End: 2024-02-06
Payer: COMMERCIAL

## 2024-02-06 DIAGNOSIS — E23.6 PITUITARY CYST: Primary | ICD-10-CM

## 2024-02-06 PROCEDURE — 1159F MED LIST DOCD IN RCRD: CPT | Mod: CPTII,95,, | Performed by: NEUROLOGICAL SURGERY

## 2024-02-06 PROCEDURE — 99215 OFFICE O/P EST HI 40 MIN: CPT | Mod: 95,,, | Performed by: NEUROLOGICAL SURGERY

## 2024-02-06 PROCEDURE — 1160F RVW MEDS BY RX/DR IN RCRD: CPT | Mod: CPTII,95,, | Performed by: NEUROLOGICAL SURGERY

## 2024-02-06 NOTE — PROGRESS NOTES
The patient location is: home  The chief complaint leading to consultation is: pituitary cyst    Visit type: audiovisual    Face to Face time with patient: 30  40 minutes of total time spent on the encounter, which includes face to face time and non-face to face time preparing to see the patient (eg, review of tests), Obtaining and/or reviewing separately obtained history, Documenting clinical information in the electronic or other health record, Independently interpreting results (not separately reported) and communicating results to the patient/family/caregiver, or Care coordination (not separately reported).         Each patient to whom he or she provides medical services by telemedicine is:  (1) informed of the relationship between the physician and patient and the respective role of any other health care provider with respect to management of the patient; and (2) notified that he or she may decline to receive medical services by telemedicine and may withdraw from such care at any time.    Notes:   CHIEF COMPLAINT:  Pituitary cyst    INTERVAL HISTORY (2/6/24):  Virtual visit for continued surveillance of pituitary cyst.  Most recent MRI shows slightly decreased size of cyst.  Clinically she does not report any signs or symptoms attributable to the cyst however she continues to have persistent chronic daily head pressure that continues to evade diagnosis.  She recently underwent a dental procedure in which her gums and palate were anesthetized.  She reports that she had complete pain relief and resolution of her head pressure for approximately 1 hour.  She also reports a similar effect after having her sinus membranes anesthetized during a sinus procedure which led to complete relief for approximately 3 days.  She follows up with Dr. Galloway for her chronic sinusitis and has a headache neurologist.  She underwent Botox injections in the past but had to stop due to worsening symptoms.    INTERVAL HISTORY  "(12/20/22):  Continued surveillance of pituitary cyst.  Patient recently underwent endonasal endoscopic sinus procedure for chronic sinusitis and infections.  She and her  report that her headaches and functionality have improved since that procedure.  However she recently contracted COVID and has a return of her headaches.  Her new updated MRI does not show any change in the size or characteristics of the pituitary cyst.    INTERVAL HISTORY (1/25/22):  The patient returns for surveillance of pituitary cyst.  Since her last visit patient has been seen by Dr. House of Neurology for her headaches.  She has undergone 1 Botox injection and 3 doses of emgality.  These have helped only marginally.  She continues to have significant headaches that she describes as pressure and tension.  She also notes that she has had some nasal drainage and has found that Sudafed helps with the head pressure.  She was seen by Ophthalmology informed her that her vision was good.  She will get me those reports.  She still feels like the headaches are debilitating.    HPI:  Wanda Singleton is a 43 y.o.  female who is referred to me for evaluation of incidentally discovered pituitary cyst.  Cyst has remained stable over 2 MRIs  by 1 month.  This was discovered after developing a constellation of symptoms including headache, blurry vision, fatigue, dizziness that have progressively worsened.  She is typically very active (rides bike, yoga) but is now unable to do 2/2 sxs.      Started with sinus pressure in Jan 1, 2021 and was treated for allergies but did not help sxs.  This progressed to "beating" sensation in her head that correlated with her heartbeat.  This progressed to headache that involves whole and radiates from back around front bilaterally.  Currently a combination of pain and pressure.  She also c/o throat pain and feels like "someone is choking her."  She also reports dizziness and was eval by ENT and " vertigo was r/o  She attended PT for vertigo but did not help.  She reports falling over and having difficulty getting up requiring her partner to pick her up.  Symptoms are worse on her period.  She was seen by neurology who diagnosed her with a headache.  Fioricet is only thing that helps minimally.    She has endometriosis with heavy periods with only 2 weeks off in between.    Hyperprolactinemia:  []  breast tenderness []  nipple discharge   [x]  Menstrual Irregularity (endometriosis)        []  Denies                  Thyroid:  [x]  fatigue       [x]  weight change (loss)      []  temp intolerance (cold)                 []  Denies   []  BM change           [x]  skin/hair change (losing hair)   [] palpitations  []  tremor        []  Denies       Growth Hormone Excess:  []  increase hand/foot size        []  teeth spacing change                               [x]  Denies  []  worse glycemic control/htn           []  Carpal tunnel                               [x]  Denies       Cushing's syndrome:  []  Easy bruising         []  Weight gain           []  Worse glycemic control  []  HTN                       []  Acne                      []  Hirsutism  []  Striae                     []  Menstrual change [x]  Denies     Gonadotrophs:      [x]  Irregular menses   []  Postmenopausal   []  Decreased libido   []  ED                         []  Denies    DI:   [x]  polyuria                 [x]  Polydipsia              []  Nocturia x 1   []  Denies                Review of patient's allergies indicates:   Allergen Reactions    Adhesive Blisters, Hives, Itching, Rash and Swelling    Bee sting kit Hives, Itching, Nausea Only and Swelling    Fluticasone Anxiety, Other (See Comments), Palpitations and Shortness Of Breath    Guaifenesin Anxiety, Hallucinations, Palpitations and Shortness Of Breath    Horseradish root extract Anaphylaxis, Hives, Nausea And Vomiting, Palpitations, Shortness Of Breath and Swelling    Bleach (sodium  hypochlorite)     Horseradish     Levofloxacin     Oxycodone Hives and Itching    Latex, natural rubber Hives, Itching and Rash    Penicillins Hives and Nausea And Vomiting    Sulfa (sulfonamide antibiotics) Anxiety, Diarrhea, Hives, Itching, Nausea And Vomiting and Palpitations       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      Past Surgical History:   Procedure Laterality Date    ETHMOIDECTOMY  7/21/2022    Procedure: ETHMOIDECTOMY;  Surgeon: Delvis Galloway MD;  Location: [facility] OR [address] [place] [address];  Service: ENT;;    FRONTAL SINUS OBLITERATION  7/21/2022    Procedure: SINUSOTOMY, FRONTAL SINUS, OBLITERATIVE;  Surgeon: Delvis Galloway MD;  Location: [facility] OR [address] [place] [address];  Service: ENT;;    FUNCTIONAL ENDOSCOPIC SINUS SURGERY (FESS) USING COMPUTER-ASSISTED NAVIGATION Bilateral 7/21/2022    Procedure: FESS, USING COMPUTER-ASSISTED NAVIGATION;  Surgeon: Delvis Galloway MD;  Location: [facility] OR [address] [place] [address];  Service: ENT;  Laterality: Bilateral;    LAPAROSCOPY      x2 for endometriosis    LEG SURGERY      x8    MAXILLARY ANTROSTOMY  7/21/2022    Procedure: MAXILLARY ANTROSTOMY;  Surgeon: Delvis Galloway MD;  Location: [facility] OR [address] [place] [address];  Service: ENT;;     Family History   Problem Relation Age of Onset    Breast cancer Neg Hx     Colon cancer Neg Hx     Ovarian cancer Neg Hx      Social History     Tobacco Use    Smoking status: Every Day     Current packs/day: 0.50     Types: Cigarettes    Smokeless tobacco: Never   Substance Use Topics    Alcohol use: Not Currently    Drug use: Not Currently        ROS    OBJECTIVE:   No focal deficits    FROM LAST VISIT:    Physical Exam:  Constitutional: Patient sitting comfortably in chair. Appears well developed and well nourished.  Skin: Exposed areas are intact without abnormal markings, rashes or other lesions.  HEENT: Normocephalic. Normal conjunctivae.  Cardiovascular: Normal  rate and regular rhythm.  Respiratory: Chest wall rises and falls symmetrically, without signs of respiratory distress.  Abdomen: Soft and non-tender.  Extremities: Warm and without edema. Calves supple, non-tender.  Psych/Behavior: Normal affect.    Neurological:    Mental status: Alert and oriented. Conversational and appropriate.       Cranial Nerves: VFF to confrontation. PERRL. EOMI without nystagmus. Facial STLT normal and symmetric. Strong, symmetric muscles of mastication. Facial strength full and symmetric. Hearing equal bilaterally to finger rub. Palate and uvula rise and fall normally in midline. Shoulder shrug 5/5 strength. Tongue midline.     Motor:    Upper:  Deltoids Triceps Biceps WE WF     R 5/5 5/5 5/5 5/5 5/5 5/5    L 5/5 5/5 5/5 5/5 5/5 5/5      Lower:  HF KE KF DF PF EHL    R 5/5 5/5 5/5 5/5 5/5 5/5    L 5/5 5/5 5/5 5/5 5/5 5/5     Sensory: Intact sensation to light touch in all extremities. Romberg negative.    Reflexes:          DTR: 2+ symmetrically throughout.     Pierce's: Negative.     Babinski's: Negative.     Clonus: Negative.    Cerebellar: Finger-to-nose and rapid alternating movements normal.     Gait stable    Diagnostic Results:  All imaging was independently reviewed by me.    MRI brain, dated 12/15/23:  1. Stable to decreased size of pituitary cyst   2. No optic nerve compression    MRI brain, dated 12/20/22:  1. Stable pituitary cyst   2. No optic nerve compression    MRI brain, dated 1/7/22:  1. Stable pituitary cyst   2. No optic nerve compression    MRI brain, dated 10/1/21:  1. Enhancing mass with sella (0.6 x 0.8 x 0.8cm)  2. No optic nerve compression    MRI brain, dated 9/13/21:  1. Mass appears stable to somewhat larger than subsequent scan  2. SWI does not appear to show any hemorrhage        ASSESSMENT/PLAN:     Problem List Items Addressed This Visit          Endocrine    Pituitary cyst - Primary         Small pituitary cystic lesion of unclear etiology found  incidentally (likely Rathke's cyst).  Slightly decreased in size on recent surveillance MRI.  Continues to have constant head pressure that she reports resolved temporarily following recent dental and sinus procedures in which her gums and sinuses were anesthetized.  She was inquiring whether there is a procedure or similar injection that could provide similar relief.  I encouraged her to discuss with Dr Galloway (ENT)and Dr Meza (neurology).    - RTC in 2yr with pituitary MRI  - F/u with Dr Meza (neurology) for headaches  - F/u with Dr Galloway (ENT) as scheduled    The patient understands and agrees with the plan of care. All questions were answered.    Time spent on this encounter: 40 minutes. This includes face-to-face time and non-face to face time preparing to see the patient (eg, review of tests), obtaining and/or reviewing separately obtained history, documenting clinical information in the electronic or other health record, independently interpreting results and communicating results to the patient/family/caregiver, or care coordinator.          .    ;

## 2024-02-18 ENCOUNTER — PATIENT MESSAGE (OUTPATIENT)
Dept: OTOLARYNGOLOGY | Facility: CLINIC | Age: 44
End: 2024-02-18
Payer: COMMERCIAL

## 2024-02-23 ENCOUNTER — OFFICE VISIT (OUTPATIENT)
Dept: NEUROLOGY | Facility: CLINIC | Age: 44
End: 2024-02-23
Payer: COMMERCIAL

## 2024-02-23 VITALS
HEIGHT: 63 IN | HEART RATE: 72 BPM | SYSTOLIC BLOOD PRESSURE: 126 MMHG | BODY MASS INDEX: 21.91 KG/M2 | DIASTOLIC BLOOD PRESSURE: 87 MMHG | WEIGHT: 123.69 LBS

## 2024-02-23 DIAGNOSIS — G50.1 ATYPICAL FACIAL PAIN: Primary | ICD-10-CM

## 2024-02-23 PROCEDURE — 99214 OFFICE O/P EST MOD 30 MIN: CPT | Mod: S$GLB,,, | Performed by: PSYCHIATRY & NEUROLOGY

## 2024-02-23 PROCEDURE — 3074F SYST BP LT 130 MM HG: CPT | Mod: CPTII,S$GLB,, | Performed by: PSYCHIATRY & NEUROLOGY

## 2024-02-23 PROCEDURE — 3079F DIAST BP 80-89 MM HG: CPT | Mod: CPTII,S$GLB,, | Performed by: PSYCHIATRY & NEUROLOGY

## 2024-02-23 PROCEDURE — 1160F RVW MEDS BY RX/DR IN RCRD: CPT | Mod: CPTII,S$GLB,, | Performed by: PSYCHIATRY & NEUROLOGY

## 2024-02-23 PROCEDURE — 1159F MED LIST DOCD IN RCRD: CPT | Mod: CPTII,S$GLB,, | Performed by: PSYCHIATRY & NEUROLOGY

## 2024-02-23 PROCEDURE — 3008F BODY MASS INDEX DOCD: CPT | Mod: CPTII,S$GLB,, | Performed by: PSYCHIATRY & NEUROLOGY

## 2024-02-23 PROCEDURE — 99999 PR PBB SHADOW E&M-EST. PATIENT-LVL III: CPT | Mod: PBBFAC,,, | Performed by: PSYCHIATRY & NEUROLOGY

## 2024-02-23 RX ORDER — CARBAMAZEPINE 100 MG/1
100 TABLET, EXTENDED RELEASE ORAL 2 TIMES DAILY
Qty: 60 TABLET | Refills: 11 | Status: SHIPPED | OUTPATIENT
Start: 2024-02-23 | End: 2025-02-22

## 2024-02-23 NOTE — PROGRESS NOTES
[facility] - NEUROLOGY 7TH FL [facility], [place]    Date: 2/23/24  Patient Name: Wanda Singleton   MRN: [id number]   PCP: Jas Gusman  Referring Provider: No ref. provider found    Assessment:   Wanda Singleton is a 43 y.o. female Presenting in follow-up for management of headache.  Patient states headaches have change care dramatically now characterized as bilateral pressure and facial pain with associated occipital tenderness.  Discussed with patient that this presentation would be highly will for trigeminal neuralgia given the lack of paroxysmal pain and given diffuse distribution.  Understand I to attempt trial of Tegretol.  Will attempt to this but discussed utility of possible nerve blocks in future should pain persist.  She states her typical headaches are currently not problematic and noticed no difference with Emgality and would like to discontinue this.  Plan:     Problem List Items Addressed This Visit    None  Visit Diagnoses       Atypical facial pain    -  Primary    Relevant Medications    carBAMazepine (TEGRETOL XR) 100 MG 12 hr tablet          I spent a total of 30 minutes preparing to see the patient, obtaining and reviewing history and results, performing a medically appropriate exam, counseling and educating the patient/family/caregiver, documenting clinical information, coordinating care, and ordering medications, tests, procedures, and referrals.      Aryan Meza MD  [facility]   Department of Neurology    Patient note was created using MModal Dictation.  Any errors in syntax or even information may not have been identified and edited on initial review prior to signing this note.  Subjective:      HPI:   Ms. Wanda Singleton is a 43 y.o. female presenting in follow-up for management of headache now complaining of facial pain.  Patient endorses bilateral facial pressure over her maxillary sinuses bilaterally  radiating into her jaw and back towards her ears.  She describes a profound sensation of pressure but also notices a burning and stinging pain over her occiput bilaterally.  She states that the ENT received some sort of anesthetic injected into her sinuses and noticed the pain rapidly resolved and remained resolved for several days.  She states she saw ENT rest concerned that she had trigeminal neuralgia.  She denies associated focal neurologic deficits.    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        PAST SURGICAL HISTORY:  Past Surgical History:   Procedure Laterality Date    ETHMOIDECTOMY  7/21/2022    Procedure: ETHMOIDECTOMY;  Surgeon: Delvis Galloway MD;  Location: [facility] OR [address] [place] [address];  Service: ENT;;    FRONTAL SINUS OBLITERATION  7/21/2022    Procedure: SINUSOTOMY, FRONTAL SINUS, OBLITERATIVE;  Surgeon: Delvis Galloway MD;  Location: [facility] OR [address] [place] [address];  Service: ENT;;    FUNCTIONAL ENDOSCOPIC SINUS SURGERY (FESS) USING COMPUTER-ASSISTED NAVIGATION Bilateral 7/21/2022    Procedure: FESS, USING COMPUTER-ASSISTED NAVIGATION;  Surgeon: Delvis Galloway MD;  Location: [facility] OR [address] [place] [address];  Service: ENT;  Laterality: Bilateral;    LAPAROSCOPY      x2 for endometriosis    LEG SURGERY      x8    MAXILLARY ANTROSTOMY  7/21/2022    Procedure: MAXILLARY ANTROSTOMY;  Surgeon: Delvis Galloway MD;  Location: [facility] OR [address] [place] [address];  Service: ENT;;       CURRENT MEDS:  Current Outpatient Medications   Medication Sig Dispense Refill    albuterol (PROVENTIL/VENTOLIN HFA) 90 mcg/actuation inhaler Inhale 2 puffs into the lungs every 6 (six) hours as  needed.      ascorbic acid, vitamin C, (VITAMIN C) 500 MG tablet Take 500 mg by mouth once daily.      diphenhydrAMINE (BENADRYL) 25 mg capsule Take 1 capsule by mouth every evening.      fluconazole (DIFLUCAN) 150 MG Tab Take 1 tablet (150mg) now, then in 3 days. 2 tablet 0    ibuprofen (ADVIL,MOTRIN) 800 MG tablet Take 1 tablet (800 mg total) by mouth every 6 (six) hours as needed for Pain. Alternate with tylenol every 3 hours as needed. 20 tablet 0    lysine (L-LYSINE) 500 mg Tab Take 1,000 mg by mouth once daily.      melatonin 5 mg Chew Take 1 tablet by mouth every evening.      NURTEC 75 mg odt Take 1 tablet (75 mg total) by mouth daily as needed for Migraine. Place ODT tablet on the tongue; alternatively the ODT tablet may be placed under the tongue 12 tablet 2    nystatin-triamcinolone (MYCOLOG II) cream Apply to affected area 2 times daily for 7 days 30 g 1    ondansetron (ZOFRAN-ODT) 8 MG TbDL Dissolve 1 tablet (8 mg total) by mouth every 8 (eight) hours as needed (nausea). 15 tablet 0    VITAMIN B COMPLEX ORAL Take 1 tablet by mouth once daily.      vitamin D (VITAMIN D3) 1000 units Tab Take 1,000 Units by mouth once daily.      azelastine (ASTELIN) 137 mcg (0.1 %) nasal spray 1 spray (137 mcg total) by Nasal route 2 (two) times daily. 30 mL 3    carBAMazepine (TEGRETOL XR) 100 MG 12 hr tablet Take 1 tablet (100 mg total) by mouth 2 (two) times daily. 60 tablet 11    norethindrone (AYGESTIN) 5 mg Tab Take 1 tablet (5 mg total) by mouth once daily. 30 tablet 11     No current facility-administered medications for this visit.     Facility-Administered Medications Ordered in Other Visits   Medication Dose Route Frequency Provider Last Rate Last Admin    haloperidol lactate injection 0.5 mg  0.5 mg Intravenous Q10 Min PRN Popeye Phelps MD           ALLERGIES:  Review of patient's allergies indicates:   Allergen Reactions    Adhesive Blisters, Hives, Itching, Rash and Swelling    Bee sting kit Hives, Itching,  "Nausea Only and Swelling    Fluticasone Anxiety, Other (See Comments), Palpitations and Shortness Of Breath    Guaifenesin Anxiety, Hallucinations, Palpitations and Shortness Of Breath    Horseradish root extract Anaphylaxis, Hives, Nausea And Vomiting, Palpitations, Shortness Of Breath and Swelling    Bleach (sodium hypochlorite)     Horseradish     Levofloxacin     Oxycodone Hives and Itching    Latex, natural rubber Hives, Itching and Rash    Penicillins Hives and Nausea And Vomiting    Sulfa (sulfonamide antibiotics) Anxiety, Diarrhea, Hives, Itching, Nausea And Vomiting and Palpitations       FAMILY HISTORY:  Family History   Problem Relation Age of Onset    Breast cancer Neg Hx     Colon cancer Neg Hx     Ovarian cancer Neg Hx        SOCIAL HISTORY:  Social History     Tobacco Use    Smoking status: Every Day     Current packs/day: 0.50     Types: Cigarettes    Smokeless tobacco: Never   Substance Use Topics    Alcohol use: Not Currently    Drug use: Not Currently       Review of Systems:  12 system review of systems is negative except for the symptoms mentioned in HPI.      Objective:     Vitals:    02/23/24 1344   BP: 126/87   Pulse: 72   Weight: 56.1 kg (123 lb 10.9 oz)   Height: 5' 3" (1.6 m)     General: NAD, well nourished   Eyes: no tearing, discharge, no erythema   ENT: moist mucous membranes of the oral cavity, nares patent    Neck: Supple, full range of motion  Cardiovascular: Warm and well perfused, pulses equal and symmetrical  Lungs: Normal work of breathing, normal chest wall excursions  Skin: No rash, lesions, or breakdown on exposed skin  Psychiatry: Mood and affect are appropriate   Abdomen: soft, non tender, non distended  Extremeties: No cyanosis, clubbing or edema.    Neurological   MENTAL STATUS: Alert and oriented to person, place, and time. Attention and concentration within normal limits. Speech without dysarthria, able to name and repeat without difficulty. Recent and remote memory " within normal limits   CRANIAL NERVES: Visual fields intact. PERRL. EOMI. Facial sensation intact. Face symmetrical. Hearing grossly intact. Full shoulder shrug bilaterally. Tongue protrudes midline   SENSORY: Sensation is intact to light touch throughout.    MOTOR: Normal bulk and tone. /55 deltoid, biceps, triceps, interosseous, hand  bilaterally. 5/5 iliopsoas, knee extension/flexion, foot dorsi/plantarflexion bilaterally.    CEREBELLAR/COORDINATION/GAIT: Gait steady. Finger to nose intact. Normal rapid alternating movements.

## 2024-03-05 ENCOUNTER — PATIENT MESSAGE (OUTPATIENT)
Dept: NEUROLOGY | Facility: CLINIC | Age: 44
End: 2024-03-05
Payer: COMMERCIAL

## 2024-03-06 DIAGNOSIS — G50.1 ATYPICAL FACIAL PAIN: Primary | ICD-10-CM

## 2024-03-21 ENCOUNTER — PATIENT MESSAGE (OUTPATIENT)
Dept: OBSTETRICS AND GYNECOLOGY | Facility: CLINIC | Age: 44
End: 2024-03-21
Payer: COMMERCIAL

## 2024-03-21 DIAGNOSIS — B37.31 YEAST VAGINITIS: Primary | ICD-10-CM

## 2024-03-21 RX ORDER — FLUCONAZOLE 150 MG/1
TABLET ORAL
Qty: 2 TABLET | Refills: 0 | Status: SHIPPED | OUTPATIENT
Start: 2024-03-21

## 2024-04-05 ENCOUNTER — OFFICE VISIT (OUTPATIENT)
Dept: PAIN MEDICINE | Facility: CLINIC | Age: 44
End: 2024-04-05
Payer: COMMERCIAL

## 2024-04-05 VITALS — HEIGHT: 63 IN | BODY MASS INDEX: 21.79 KG/M2 | WEIGHT: 123 LBS

## 2024-04-05 DIAGNOSIS — G50.1 ATYPICAL FACIAL PAIN: ICD-10-CM

## 2024-04-05 PROCEDURE — 99999 PR PBB SHADOW E&M-EST. PATIENT-LVL IV: CPT | Mod: PBBFAC,,, | Performed by: STUDENT IN AN ORGANIZED HEALTH CARE EDUCATION/TRAINING PROGRAM

## 2024-04-05 PROCEDURE — 99204 OFFICE O/P NEW MOD 45 MIN: CPT | Mod: S$GLB,,, | Performed by: STUDENT IN AN ORGANIZED HEALTH CARE EDUCATION/TRAINING PROGRAM

## 2024-04-05 PROCEDURE — 1160F RVW MEDS BY RX/DR IN RCRD: CPT | Mod: CPTII,S$GLB,, | Performed by: STUDENT IN AN ORGANIZED HEALTH CARE EDUCATION/TRAINING PROGRAM

## 2024-04-05 PROCEDURE — 1159F MED LIST DOCD IN RCRD: CPT | Mod: CPTII,S$GLB,, | Performed by: STUDENT IN AN ORGANIZED HEALTH CARE EDUCATION/TRAINING PROGRAM

## 2024-04-05 PROCEDURE — 3008F BODY MASS INDEX DOCD: CPT | Mod: CPTII,S$GLB,, | Performed by: STUDENT IN AN ORGANIZED HEALTH CARE EDUCATION/TRAINING PROGRAM

## 2024-04-05 RX ORDER — PREGABALIN 50 MG/1
50 CAPSULE ORAL 2 TIMES DAILY
Qty: 60 CAPSULE | Refills: 2 | Status: SHIPPED | OUTPATIENT
Start: 2024-04-05 | End: 2024-05-05

## 2024-04-05 NOTE — PROGRESS NOTES
[place] - Department    Jas Gusman      First Office Visit: 4/5/24  Today' Date: 4/5/2024  Last Office Visit: None    Chief complaint: face pain     HPI: Pt is a pleasant 43 y.o., who presents for evaluation. Referred by Dr. Meza. Pt complains of facial pain for 3 years. She has seen a neurologist, an ENT surgeon, and a neurosurgeon regarding her pain. Has tried a wide range of medications to help with the pain including NSAIDs, norco, and TCA. Has tried botox with minimal relief. Has not tried trigeminal nerve block and states it is the reason she was referred here. Pt states she had temporary relief when she had some nerve blocks for dental procedures and for her sinus surgery. Also states she has not tried Lyrica or gabapentin.         Pain disability score: 56  Pain score: 8    Relevant Imaging/ Testing:   CT sinuses 12/23  MRI pituitary 12/23    Procedures: None    Date of board of pharmacy review:4/5/2024  Date of opioid risk screening/ pain psych: None  Date of opioid agreement and consent: None  Date of urine drug screen: None  Date of random pill count: None     was reviewed today: reviewed, no concerns     Prescribed medications: None    See EHR for  PMH, PSH, FH, SH, Medications and Allergy    ROS:  Positive for pain  ROS     PE:  There were no vitals filed for this visit.  General: Pleasant, no distress  HEENT: NC/ AT. PERRLA  CV: Radial pulses intact  Pulm: No distress  Ext: No edema    Physical Exam     Neuromusculoskeletal:  Head: NC, AT. PERRLA  Neck: Intact range of motions  Shoulder: Intact range of motion  Lumbar: Intact range of motion  Hip: Intact range of motion  SI: Level  Knee: Intact range of motion  Reflexes: normal Knee  Strength: 5/5 globally   Sensory: Grossly intact   Skin: No bruising, erythema  Gait: Normal      Impression:  Facial pain   Relevant History  Migraine  Pituitary cyst         Plan:  Discussed options  Imaging/ relevant records viewed/ reviewed/  discussed  Imaging results viewed and reviewed (noted above)/ reviewed with patient   reviewed  Lyrica 50 mg BID trial - pt is very sensitive to meds  B trigeminal nerve block - pt to consider   Re-eval after  Consider increasing lyrica or switching to gabapentin       Prescribed medications:  1. Lyrica     The impression and plan were discussed and explained in detail. All the questions were answered. Education was provided accordingly.     The appropriate use of the medications, including storage, risks (including addiction) and potential sides effects were explained and discussed.      Follow-up:  3 mos or sooner if needed    Vinita Broderick MD

## 2024-04-18 ENCOUNTER — PATIENT MESSAGE (OUTPATIENT)
Dept: NEUROLOGY | Facility: CLINIC | Age: 44
End: 2024-04-18
Payer: COMMERCIAL

## 2024-04-29 ENCOUNTER — PATIENT MESSAGE (OUTPATIENT)
Dept: NEUROSURGERY | Facility: CLINIC | Age: 44
End: 2024-04-29
Payer: COMMERCIAL

## 2024-05-10 ENCOUNTER — LAB VISIT (OUTPATIENT)
Dept: LAB | Facility: OTHER | Age: 44
End: 2024-05-10
Attending: OBSTETRICS & GYNECOLOGY
Payer: COMMERCIAL

## 2024-05-10 ENCOUNTER — OFFICE VISIT (OUTPATIENT)
Dept: OBSTETRICS AND GYNECOLOGY | Facility: CLINIC | Age: 44
End: 2024-05-10
Payer: COMMERCIAL

## 2024-05-10 VITALS
DIASTOLIC BLOOD PRESSURE: 72 MMHG | BODY MASS INDEX: 21.56 KG/M2 | SYSTOLIC BLOOD PRESSURE: 117 MMHG | WEIGHT: 121.69 LBS | HEIGHT: 63 IN | HEART RATE: 73 BPM

## 2024-05-10 DIAGNOSIS — Z01.419 ENCOUNTER FOR WELL WOMAN EXAM WITH ROUTINE GYNECOLOGICAL EXAM: Primary | ICD-10-CM

## 2024-05-10 DIAGNOSIS — Z12.4 CERVICAL CANCER SCREENING: ICD-10-CM

## 2024-05-10 DIAGNOSIS — Z12.31 ENCOUNTER FOR SCREENING MAMMOGRAM FOR MALIGNANT NEOPLASM OF BREAST: ICD-10-CM

## 2024-05-10 DIAGNOSIS — N90.5 VULVAR ATROPHY: ICD-10-CM

## 2024-05-10 DIAGNOSIS — N88.9 CERVICAL LESION: ICD-10-CM

## 2024-05-10 DIAGNOSIS — R53.83 FATIGUE, UNSPECIFIED TYPE: ICD-10-CM

## 2024-05-10 LAB
DHEA-S SERPL-MCNC: 138.6 UG/DL (ref 74.8–410.2)
ESTIMATED AVG GLUCOSE: 100 MG/DL (ref 68–131)
ESTRADIOL SERPL-MCNC: 116 PG/ML
FOLATE SERPL-MCNC: 14.2 NG/ML (ref 4–24)
FSH SERPL-ACNC: 7.34 MIU/ML
HBA1C MFR BLD: 5.1 % (ref 4–5.6)
PROGEST SERPL-MCNC: 15 NG/ML
PROLACTIN SERPL IA-MCNC: 8.6 NG/ML (ref 5.2–26.5)
TSH SERPL DL<=0.005 MIU/L-ACNC: 1.05 UIU/ML (ref 0.4–4)
VIT B12 SERPL-MCNC: 524 PG/ML (ref 210–950)

## 2024-05-10 PROCEDURE — 83036 HEMOGLOBIN GLYCOSYLATED A1C: CPT | Performed by: OBSTETRICS & GYNECOLOGY

## 2024-05-10 PROCEDURE — 1159F MED LIST DOCD IN RCRD: CPT | Mod: CPTII,S$GLB,, | Performed by: OBSTETRICS & GYNECOLOGY

## 2024-05-10 PROCEDURE — 3074F SYST BP LT 130 MM HG: CPT | Mod: CPTII,S$GLB,, | Performed by: OBSTETRICS & GYNECOLOGY

## 2024-05-10 PROCEDURE — 99999 PR PBB SHADOW E&M-EST. PATIENT-LVL IV: CPT | Mod: PBBFAC,,, | Performed by: OBSTETRICS & GYNECOLOGY

## 2024-05-10 PROCEDURE — 84402 ASSAY OF FREE TESTOSTERONE: CPT | Performed by: OBSTETRICS & GYNECOLOGY

## 2024-05-10 PROCEDURE — 36415 COLL VENOUS BLD VENIPUNCTURE: CPT | Performed by: OBSTETRICS & GYNECOLOGY

## 2024-05-10 PROCEDURE — 82627 DEHYDROEPIANDROSTERONE: CPT | Performed by: OBSTETRICS & GYNECOLOGY

## 2024-05-10 PROCEDURE — 82652 VIT D 1 25-DIHYDROXY: CPT | Performed by: OBSTETRICS & GYNECOLOGY

## 2024-05-10 PROCEDURE — 88175 CYTOPATH C/V AUTO FLUID REDO: CPT | Performed by: OBSTETRICS & GYNECOLOGY

## 2024-05-10 PROCEDURE — 84443 ASSAY THYROID STIM HORMONE: CPT | Performed by: OBSTETRICS & GYNECOLOGY

## 2024-05-10 PROCEDURE — 83001 ASSAY OF GONADOTROPIN (FSH): CPT | Performed by: OBSTETRICS & GYNECOLOGY

## 2024-05-10 PROCEDURE — 84144 ASSAY OF PROGESTERONE: CPT | Performed by: OBSTETRICS & GYNECOLOGY

## 2024-05-10 PROCEDURE — 1160F RVW MEDS BY RX/DR IN RCRD: CPT | Mod: CPTII,S$GLB,, | Performed by: OBSTETRICS & GYNECOLOGY

## 2024-05-10 PROCEDURE — 82670 ASSAY OF TOTAL ESTRADIOL: CPT | Performed by: OBSTETRICS & GYNECOLOGY

## 2024-05-10 PROCEDURE — 84146 ASSAY OF PROLACTIN: CPT | Performed by: OBSTETRICS & GYNECOLOGY

## 2024-05-10 PROCEDURE — 3078F DIAST BP <80 MM HG: CPT | Mod: CPTII,S$GLB,, | Performed by: OBSTETRICS & GYNECOLOGY

## 2024-05-10 PROCEDURE — 82746 ASSAY OF FOLIC ACID SERUM: CPT | Performed by: OBSTETRICS & GYNECOLOGY

## 2024-05-10 PROCEDURE — 82607 VITAMIN B-12: CPT | Performed by: OBSTETRICS & GYNECOLOGY

## 2024-05-10 PROCEDURE — 87624 HPV HI-RISK TYP POOLED RSLT: CPT | Performed by: OBSTETRICS & GYNECOLOGY

## 2024-05-10 PROCEDURE — 99396 PREV VISIT EST AGE 40-64: CPT | Mod: S$GLB,,, | Performed by: OBSTETRICS & GYNECOLOGY

## 2024-05-10 PROCEDURE — 83498 ASY HYDROXYPROGESTERONE 17-D: CPT | Performed by: OBSTETRICS & GYNECOLOGY

## 2024-05-10 PROCEDURE — 3008F BODY MASS INDEX DOCD: CPT | Mod: CPTII,S$GLB,, | Performed by: OBSTETRICS & GYNECOLOGY

## 2024-05-10 NOTE — ASSESSMENT & PLAN NOTE
Normal breast and pelvic exams except as noted in documentation. Pap/cotesting re-collected due to persistent irregular spotting and area of raised hypopigmentation visualized around the cervix (although difficult to fully exam due to vulvovaginal atrophy). STD screening declined. MMG ordered today, will schedule. Continue breast self awareness, recommend 30 minutes of exercise 5 times weekly. Follow up with PCP for routine health maintenance.     Condoms recommended for pregnancy prevention. Patient counseled to take a PNV if not actively using contraception.

## 2024-05-10 NOTE — PROGRESS NOTES
Chief Complaint: Annual exam    Chief Complaint   Patient presents with    Hormones       HPI:   43 y.o.  here today for annual exam. Denies any changes to health history since last visit but has multiple complaints and concerns about her hormones and possible early menopause, would like to discuss hormones. Also with history of endometriosis. In the past 6 months menstrual bleeding has decreased, periods are spacing out, cycles are getting lighter, heavy for 1 day instead of 6, however she still c/o intermenstrual spotting (US done last year was normal with EMS 4 mm, 3-4 cm endometrioma on ROV), reports spotting almost every day requiring the use of a pad, blood is occasionally brownish like old blood, occasionally light pink spotting. Family history of early menopause. Was prescribed Aygestin last year for endometriosis/AUB but did not like the side effects and stopped taking it.     Constipation has worsened lately. Also notes ears ringing and worse headaches. Denies significant hot flashes or night sweats, but does have temperature dysregulation and often get cold. In PT for head and upper back pain/tension, denies diagnosis of fibromyalgia. Has seen pain management but declines starting gabapentin. Has appointment for acupuncture next week. Has seen ENT, two neurologist and a neurosurgeon and no one can decide what the cause of her pain and headaches and tinnitus is. She is vegan.     Denies abnormal breast symptoms, denies FH of breast, uterine, ovarian, or colon cancer. She is currently sexually active, , . Currently using condoms for birth control. She has been vaccinated against COVID-19.    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      Past Surgical History:   Procedure Laterality Date    ETHMOIDECTOMY  7/21/2022    Procedure: ETHMOIDECTOMY;  Surgeon: Delvis Galloway MD;  Location: [facility] OR 2ND FLR;  Service: ENT;;    FRONTAL SINUS OBLITERATION  7/21/2022    Procedure: SINUSOTOMY, FRONTAL SINUS, OBLITERATIVE;  Surgeon: Delvis Galloway MD;  Location: [facility] OR 2ND FLR;  Service: ENT;;    FUNCTIONAL ENDOSCOPIC SINUS SURGERY (FESS) USING COMPUTER-ASSISTED NAVIGATION Bilateral 7/21/2022    Procedure: FESS, USING COMPUTER-ASSISTED NAVIGATION;  Surgeon: Delvis Galloway MD;  Location: [facility] OR 2ND FLR;  Service: ENT;  Laterality: Bilateral;    LAPAROSCOPY      x2 for endometriosis    LEG SURGERY      x8    MAXILLARY ANTROSTOMY  7/21/2022    Procedure: MAXILLARY ANTROSTOMY;  Surgeon: Delvis Galloway MD;  Location: [facility] OR 2ND FLR;  Service: ENT;;       Current Outpatient Medications:     albuterol (PROVENTIL/VENTOLIN HFA) 90 mcg/actuation inhaler, Inhale 2 puffs into the lungs every 6 (six) hours as needed., Disp: , Rfl:     ascorbic acid, vitamin C, (VITAMIN C) 500 MG tablet, Take 500 mg by mouth once daily., Disp: , Rfl:     diphenhydrAMINE (BENADRYL) 25 mg capsule, Take 1 capsule by mouth every evening., Disp: , Rfl:     ibuprofen (ADVIL,MOTRIN) 800 MG tablet, Take 1 tablet (800 mg total) by mouth every 6 (six) hours as needed for Pain. Alternate with tylenol every 3 hours as needed., Disp: 20 tablet, Rfl: 0    VITAMIN B COMPLEX ORAL, Take 1 tablet by mouth once daily., Disp: , Rfl:     vitamin D (VITAMIN D3) 1000 units Tab, Take 1,000 Units by mouth once daily., Disp: , Rfl:     azelastine (ASTELIN) 137 mcg (0.1 %) nasal spray, 1 spray (137 mcg total) by Nasal route 2 (two) times daily., Disp: 30 mL, Rfl: 3    carBAMazepine (TEGRETOL XR) 100 MG 12 hr tablet, Take 1 tablet (100 mg total) by mouth 2 (two) times daily., Disp: 60 tablet, Rfl: 11    fluconazole (DIFLUCAN) 150  MG Tab, Take 1 tablet (150mg) now, then in 3 days. (Patient not taking: Reported on 5/10/2024), Disp: 2 tablet, Rfl: 0    lysine (L-LYSINE) 500 mg Tab, Take 1,000 mg by mouth once daily. (Patient not taking: Reported on 5/10/2024), Disp: , Rfl:     melatonin 5 mg Chew, Take 1 tablet by mouth every evening. (Patient not taking: Reported on 5/10/2024), Disp: , Rfl:     norethindrone (AYGESTIN) 5 mg Tab, Take 1 tablet (5 mg total) by mouth once daily., Disp: 30 tablet, Rfl: 11    NURTEC 75 mg odt, Take 1 tablet (75 mg total) by mouth daily as needed for Migraine. Place ODT tablet on the tongue; alternatively the ODT tablet may be placed under the tongue (Patient not taking: Reported on 5/10/2024), Disp: 12 tablet, Rfl: 2    nystatin-triamcinolone (MYCOLOG II) cream, Apply to affected area 2 times daily for 7 days (Patient not taking: Reported on 5/10/2024), Disp: 30 g, Rfl: 1    ondansetron (ZOFRAN-ODT) 8 MG TbDL, Dissolve 1 tablet (8 mg total) by mouth every 8 (eight) hours as needed (nausea)., Disp: 15 tablet, Rfl: 0    pregabalin (LYRICA) 50 MG capsule, Take 1 capsule (50 mg total) by mouth 2 (two) times daily., Disp: 60 capsule, Rfl: 2  No current facility-administered medications for this visit.    Facility-Administered Medications Ordered in Other Visits:     haloperidol lactate injection 0.5 mg, 0.5 mg, Intravenous, Q10 Min PRN, Popeye Phelps MD  Review of patient's allergies indicates:   Allergen Reactions    Adhesive Blisters, Hives, Itching, Rash and Swelling    Bee sting kit Hives, Itching, Nausea Only and Swelling    Fluticasone Anxiety, Other (See Comments), Palpitations and Shortness Of Breath    Guaifenesin Anxiety, Hallucinations, Palpitations and Shortness Of Breath    Horseradish root extract Anaphylaxis, Hives, Nausea And Vomiting, Palpitations, Shortness Of Breath and Swelling    Bleach (sodium hypochlorite)     Horseradish     Levofloxacin     Oxycodone Hives and Itching    Latex, natural rubber  Hives, Itching and Rash    Penicillins Hives and Nausea And Vomiting    Sulfa (sulfonamide antibiotics) Anxiety, Diarrhea, Hives, Itching, Nausea And Vomiting and Palpitations     OB History    Para Term  AB Living   0 0 0         SAB IAB Ectopic Multiple Live Births                 Social History     Tobacco Use    Smoking status: Every Day     Current packs/day: 0.50     Types: Cigarettes    Smokeless tobacco: Never   Substance Use Topics    Alcohol use: Not Currently    Drug use: Not Currently     Family History   Problem Relation Name Age of Onset    Breast cancer Neg Hx      Colon cancer Neg Hx      Ovarian cancer Neg Hx         Review of Systems   Negative except as in HPI     Physical Exam   Vitals:    05/10/24 1422   BP: 117/72   Pulse: 73     Body mass index is 21.56 kg/m².    Physical Exam  Constitutional:       General: She is not in acute distress.     Appearance: Normal appearance.     Genitourinary:    Vulva, vagina, uterus and urethral meatus normal.   The external female genitalia was normal.   No external genitalia lesions identified,Genitalia hair distrobution normal .               No vaginal discharge or bleeding in the vagina.    No vaginal prolapse present.     Mild vaginal atrophy present.  Right adnexum displays no mass, no tenderness and no fullness. Left adnexum displays no mass, no tenderness and no fullness. Cervix is normal. Cervix exhibits no motion tenderness and no lesion. Uerus contour normal  Uterus is not tender and no mass.    Uterus is anteverted.   Breasts:     Right: No inverted nipple, mass, nipple discharge or skin change.      Left: No inverted nipple, mass, nipple discharge or skin change.   HENT:      Head: Normocephalic and atraumatic.   Eyes:      Extraocular Movements: Extraocular movements intact.      Pupils: Pupils are equal, round, and reactive to light.   Neck:      Thyroid: No thyroid mass, thyromegaly or thyroid tenderness.   Pulmonary:      Effort:  Pulmonary effort is normal.   Abdominal:      General: Abdomen is flat. There is no distension.      Palpations: Abdomen is soft.      Tenderness: There is no abdominal tenderness. There is no guarding or rebound.   Musculoskeletal:         General: Normal range of motion.      Cervical back: Normal range of motion.   Lymphadenopathy:      Cervical: No cervical adenopathy.      Upper Body:      Right upper body: No supraclavicular or axillary adenopathy.      Left upper body: No supraclavicular or axillary adenopathy.   Neurological:      General: No focal deficit present.      Mental Status: She is alert and oriented to person, place, and time.   Skin:     General: Skin is warm and dry.   Psychiatric:         Mood and Affect: Mood normal.         Behavior: Behavior normal.   Vitals reviewed.          ASSESSMENT:   Annual Well Women Exam  Patient Active Problem List   Diagnosis    Pituitary cyst    Headache    Visual changes    Intractable migraine without aura and with status migrainosus    Sinus pressure    Chronic sinusitis    Endometriosis    Anosmia    Encounter for well woman exam with routine gynecological exam    Fatigue    Vulvar atrophy     Health Maintenance Due   Topic Date Due    Hepatitis C Screening  Never done    Pneumococcal Vaccines (Age 0-64) (1 of 2 - PCV) Never done    HIV Screening  Never done    TETANUS VACCINE  Never done    COVID-19 Vaccine (4 - 2023-24 season) 09/01/2023    Mammogram  12/02/2023     Health Maintenance Topics with due status: Not Due       Topic Last Completion Date    Cervical Cancer Screening 10/07/2022    Influenza Vaccine Not Due       PLAN:  Problem List Items Addressed This Visit          Renal/    Encounter for well woman exam with routine gynecological exam - Primary    Current Assessment & Plan     Normal breast and pelvic exams except as noted in documentation. Pap/cotesting re-collected due to persistent irregular spotting and area of raised hypopigmentation  visualized around the cervix (although difficult to fully exam due to vulvovaginal atrophy). STD screening declined. MMG ordered today, will schedule. Continue breast self awareness, recommend 30 minutes of exercise 5 times weekly. Follow up with PCP for routine health maintenance.     Condoms recommended for pregnancy prevention. Patient counseled to take a PNV if not actively using contraception.            Relevant Orders    Mammo Digital Screening Bilat w/ Saurabh    Vulvar atrophy    Current Assessment & Plan     Consider topical estrace, will wait until hormones come back to see if HRT is indicated.            Other    Fatigue    Current Assessment & Plan     Concerned about hormone changes. Hormone panel obtained today. Also with continued irregular bleeding/spotting with normal TVUS from last year. RTC 3 weeks to discuss results and treatment options.          Relevant Orders    Follicle Stimulating Hormone    Estradiol    Prolactin    TSH (Completed)    Hemoglobin A1C    17-HYDROXYPROGESTERONE    Testosterone, Free    DHEA-SULFATE    PROGESTERONE    VITAMIN B12    FOLATE    Calcitriol     Other Visit Diagnoses       Cervical cancer screening        Encounter for screening mammogram for malignant neoplasm of breast        Cervical lesion        Relevant Orders    Liquid-Based Pap Smear, Screening    HPV High Risk Genotypes, PCR            Follow up in about 3 weeks (around 5/31/2024) for Virtual Visit/Results.       Yenny Dang MD  Department of Obstetrics & Gynecology  [facility]

## 2024-05-13 LAB
1,25(OH)2D3 SERPL-MCNC: 58 PG/ML (ref 20–79)
17OHP SERPL-MCNC: 143 NG/DL (ref 35–413)
TESTOST FREE SERPL-MCNC: <0.4 PG/ML

## 2024-05-17 LAB
FINAL PATHOLOGIC DIAGNOSIS: NORMAL
Lab: NORMAL

## 2024-06-04 ENCOUNTER — OFFICE VISIT (OUTPATIENT)
Dept: OBSTETRICS AND GYNECOLOGY | Facility: CLINIC | Age: 44
End: 2024-06-04
Payer: COMMERCIAL

## 2024-06-04 ENCOUNTER — TELEPHONE (OUTPATIENT)
Dept: INTERNAL MEDICINE | Facility: CLINIC | Age: 44
End: 2024-06-04

## 2024-06-04 DIAGNOSIS — N95.1 PERIMENOPAUSE: ICD-10-CM

## 2024-06-04 DIAGNOSIS — R53.83 FATIGUE, UNSPECIFIED TYPE: Primary | ICD-10-CM

## 2024-06-04 PROCEDURE — 3044F HG A1C LEVEL LT 7.0%: CPT | Mod: CPTII,95,, | Performed by: OBSTETRICS & GYNECOLOGY

## 2024-06-04 PROCEDURE — 99214 OFFICE O/P EST MOD 30 MIN: CPT | Mod: 95,,, | Performed by: OBSTETRICS & GYNECOLOGY

## 2024-06-04 PROCEDURE — 1160F RVW MEDS BY RX/DR IN RCRD: CPT | Mod: CPTII,95,, | Performed by: OBSTETRICS & GYNECOLOGY

## 2024-06-04 PROCEDURE — 1159F MED LIST DOCD IN RCRD: CPT | Mod: CPTII,95,, | Performed by: OBSTETRICS & GYNECOLOGY

## 2024-06-04 RX ORDER — PROGESTERONE 200 MG/1
200 CAPSULE ORAL NIGHTLY
Qty: 30 CAPSULE | Refills: 11 | Status: SHIPPED | OUTPATIENT
Start: 2024-06-04 | End: 2024-06-17

## 2024-06-04 NOTE — TELEPHONE ENCOUNTER
----- Message from William Barreto sent at 6/4/2024 10:50 AM CDT -----  Regarding: Ambulatory referral/consult to Internal Medicine  Good morning,     Patient is requesting a callback ASAP this morning to schedule soonest available appointment referred by Yenny Marcelo regarding  referral/consult to Internal Medicine. There are no appointments available in [facility] for scheduled. Please give the patient a callback at [phone].      Dx: Fatigue, unspecified type [R53.83]      Thank you,     UCHE Barreto

## 2024-06-04 NOTE — PROGRESS NOTES
The patient location is: [facility]  The chief complaint leading to consultation is: Results follow up  Visit type: audiovisual  Total time spent with patient: 15 minutes    Each patient to whom he or she provides medical services by telemedicine is:  (1) informed of the relationship between the physician and patient and the respective role of any other health care provider with respect to management of the patient; and (2) notified that he or she may decline to receive medical services by telemedicine and may withdraw from such care at any time.      Chief Complaint: Discuss results     HPI:      Wanda Singleton is a 44 y.o.  who presents via virtual visit to discuss hormone results. See results below. Notes irregular bleeding that has been present for the past several years. Mostly spotting. History of CPP/endometriosis.     Bleeding history:  In the past 6 months menstrual bleeding has decreased, periods are spacing out, cycles are getting lighter, heavy for 1 day instead of 6, however she still c/o intermenstrual spotting (US done last year was normal with EMS 4 mm, 3-4 cm endometrioma on ROV), reports spotting almost every day requiring the use of a pad, blood is occasionally brownish like old blood, occasionally light pink spotting. Family history of early menopause. Was prescribed Aygestin last year for endometriosis/AUB but did not like the side effects and stopped taking it.     Patient does not have regular monthly menses.     ROS:     GENERAL: Denies fevers or chills. Feeling well overall.   ABDOMEN: Denies abdominal pain, constipation, diarrhea, nausea, vomiting, change in appetite.   URINARY: Denies frequency, dysuria, hematuria.  GYNECOLOGIC: See HPI.  NEUROLOGIC: Denies syncope or weakness.     Physical Exam:      PHYSICAL EXAM:  LMP 2024 (Approximate)   There is no height or weight on file to calculate BMI.     APPEARANCE: Well nourished, well developed, in no acute  distress.  Exam deferred due to televisit    Results:      Pap (5/2024): NILM/HPV neg  TVUS (10/2022): Uterus 9x6x5 cm, EMS 4 mm, 12 mm posterior fibroid, ROV with 15 mm endometrioma, LOV WNL.   FSH: 7.34  E2: 116  Prolactin: 8.6  TSH: 1.052  A1c: 5.1  17-OHP, DHEA-S, progesterone WNL  Testosterone: <0.4  B12, folate, Vit D: WNL    Assessment/Plan:     Problem List Items Addressed This Visit          Renal/    Perimenopause    Current Assessment & Plan     Hormone levels WNL. Still with persistent daily spotting. Will start PM prometrium 200 mg, RTC 3 months, if no improvement in nearly daily spotting, will perform EMBx and consider hysterectomy as patient largely declining medical management of AUB and estrogen is contraindicated.     Will refer to new PCP per patient request to address multiple other concerns.          Relevant Medications    progesterone (PROMETRIUM) 200 MG capsule       Other    Fatigue - Primary    Relevant Medications    progesterone (PROMETRIUM) 200 MG capsule    Other Relevant Orders    Ambulatory referral/consult to Internal Medicine     Counseling:       Use of the [facility] Patient Portal discussed and encouraged during today's visit.         Yenny Dang MD  Department of Obstetrics & Gynecology  [facility]

## 2024-06-09 NOTE — ASSESSMENT & PLAN NOTE
Hormone levels WNL. Still with persistent daily spotting. Will start PM prometrium 200 mg, RTC 3 months, if no improvement in nearly daily spotting, will perform EMBx and consider hysterectomy as patient largely declining medical management of AUB and estrogen is contraindicated.     Will refer to new PCP per patient request to address multiple other concerns.

## 2024-06-10 ENCOUNTER — PATIENT MESSAGE (OUTPATIENT)
Dept: OTOLARYNGOLOGY | Facility: CLINIC | Age: 44
End: 2024-06-10
Payer: COMMERCIAL

## 2024-06-10 ENCOUNTER — TELEPHONE (OUTPATIENT)
Dept: INTERNAL MEDICINE | Facility: CLINIC | Age: 44
End: 2024-06-10
Payer: COMMERCIAL

## 2024-06-10 ENCOUNTER — PATIENT MESSAGE (OUTPATIENT)
Dept: OBSTETRICS AND GYNECOLOGY | Facility: CLINIC | Age: 44
End: 2024-06-10
Payer: COMMERCIAL

## 2024-06-10 ENCOUNTER — TELEPHONE (OUTPATIENT)
Dept: OTOLARYNGOLOGY | Facility: CLINIC | Age: 44
End: 2024-06-10
Payer: COMMERCIAL

## 2024-06-10 NOTE — TELEPHONE ENCOUNTER
----- Message from William Barreto sent at 6/10/2024  8:51 AM CDT -----  Regarding: FW: Ambulatory referral/consult to Internal Medicine  Good morning,     Patient is requesting a callback ASAP this morning to schedule soonest available appointment referred by Yenny Marcelo regarding  referral/consult to Internal Medicine. Patient appointment is not displaying in EPIC. Please give the patient a callback at [phone].      Dx: Fatigue, unspecified type [R53.83]    Thank you,     UCHE Barreto  ----- Message -----  From: William Barreto  Sent: 6/4/2024  10:57 AM CDT  To: [facility] Staff  Subject: Ambulatory referral/consult to Internal [facility]#    Good morning,     Patient is requesting a callback ASAP this morning to schedule soonest available appointment referred by Yenny Marcelo regarding  referral/consult to Internal Medicine. There are no appointments available in [facility] for scheduled. Please give the patient a callback at [phone].      Dx: Fatigue, unspecified type [R53.83]      Thank you,     UCHE Barreto

## 2024-06-11 ENCOUNTER — PATIENT MESSAGE (OUTPATIENT)
Dept: OBSTETRICS AND GYNECOLOGY | Facility: CLINIC | Age: 44
End: 2024-06-11
Payer: COMMERCIAL

## 2024-06-16 ENCOUNTER — PATIENT MESSAGE (OUTPATIENT)
Dept: OBSTETRICS AND GYNECOLOGY | Facility: CLINIC | Age: 44
End: 2024-06-16
Payer: COMMERCIAL

## 2024-06-16 DIAGNOSIS — N95.1 PERIMENOPAUSE: Primary | ICD-10-CM

## 2024-06-17 RX ORDER — PROGESTERONE 100 MG/1
100 CAPSULE ORAL NIGHTLY
Qty: 30 CAPSULE | Refills: 11 | Status: SHIPPED | OUTPATIENT
Start: 2024-06-17 | End: 2024-06-20 | Stop reason: SDUPTHER

## 2024-06-19 ENCOUNTER — PATIENT MESSAGE (OUTPATIENT)
Dept: OBSTETRICS AND GYNECOLOGY | Facility: CLINIC | Age: 44
End: 2024-06-19
Payer: COMMERCIAL

## 2024-06-19 ENCOUNTER — PATIENT MESSAGE (OUTPATIENT)
Dept: NEUROLOGY | Facility: CLINIC | Age: 44
End: 2024-06-19
Payer: COMMERCIAL

## 2024-06-19 DIAGNOSIS — N95.1 PERIMENOPAUSE: ICD-10-CM

## 2024-06-19 DIAGNOSIS — N80.9 ENDOMETRIOSIS: Primary | ICD-10-CM

## 2024-06-20 DIAGNOSIS — J32.9 CHRONIC SINUSITIS, UNSPECIFIED LOCATION: Primary | ICD-10-CM

## 2024-06-20 RX ORDER — PROGESTERONE 100 MG/1
100 CAPSULE ORAL NIGHTLY
Qty: 30 CAPSULE | Refills: 11 | Status: SHIPPED | OUTPATIENT
Start: 2024-06-20 | End: 2025-06-20

## 2024-06-20 RX ORDER — DOXYCYCLINE HYCLATE 100 MG
100 TABLET ORAL 2 TIMES DAILY
Qty: 20 TABLET | Refills: 0 | Status: SHIPPED | OUTPATIENT
Start: 2024-06-20 | End: 2024-06-30

## 2024-07-02 ENCOUNTER — PATIENT MESSAGE (OUTPATIENT)
Dept: OBSTETRICS AND GYNECOLOGY | Facility: CLINIC | Age: 44
End: 2024-07-02
Payer: COMMERCIAL

## 2024-07-02 DIAGNOSIS — B37.31 YEAST VAGINITIS: ICD-10-CM

## 2024-07-03 RX ORDER — FLUCONAZOLE 150 MG/1
TABLET ORAL
Qty: 2 TABLET | Refills: 0 | Status: SHIPPED | OUTPATIENT
Start: 2024-07-03

## 2024-07-10 ENCOUNTER — PATIENT MESSAGE (OUTPATIENT)
Dept: OBSTETRICS AND GYNECOLOGY | Facility: CLINIC | Age: 44
End: 2024-07-10
Payer: COMMERCIAL

## 2024-07-10 DIAGNOSIS — G89.29 CHRONIC NONINTRACTABLE HEADACHE, UNSPECIFIED HEADACHE TYPE: ICD-10-CM

## 2024-07-10 DIAGNOSIS — R51.9 CHRONIC NONINTRACTABLE HEADACHE, UNSPECIFIED HEADACHE TYPE: ICD-10-CM

## 2024-07-10 DIAGNOSIS — R53.83 FATIGUE, UNSPECIFIED TYPE: Primary | ICD-10-CM

## 2024-07-12 ENCOUNTER — TELEPHONE (OUTPATIENT)
Dept: OBSTETRICS AND GYNECOLOGY | Facility: CLINIC | Age: 44
End: 2024-07-12
Payer: COMMERCIAL

## 2024-07-12 NOTE — TELEPHONE ENCOUNTER
Called pt and lvm. Stated that  would like her to have labs done. Asked her to call us back so we can schedule.

## 2024-07-19 ENCOUNTER — HOSPITAL ENCOUNTER (OUTPATIENT)
Dept: RADIOLOGY | Facility: CLINIC | Age: 44
Discharge: HOME | End: 2024-07-19
Attending: OBSTETRICS & GYNECOLOGY
Payer: COMMERCIAL

## 2024-07-19 DIAGNOSIS — Z01.419 ENCOUNTER FOR WELL WOMAN EXAM WITH ROUTINE GYNECOLOGICAL EXAM: ICD-10-CM

## 2024-07-19 PROCEDURE — 77067 SCR MAMMO BI INCL CAD: CPT | Mod: TC,PO

## 2024-07-19 PROCEDURE — 77063 BREAST TOMOSYNTHESIS BI: CPT | Mod: TC,PO

## 2024-07-19 PROCEDURE — 77067 SCR MAMMO BI INCL CAD: CPT | Mod: 26,,, | Performed by: RADIOLOGY

## 2024-07-19 PROCEDURE — 77063 BREAST TOMOSYNTHESIS BI: CPT | Mod: 26,,, | Performed by: RADIOLOGY

## 2024-08-09 ENCOUNTER — OFFICE VISIT (OUTPATIENT)
Dept: OTOLARYNGOLOGY | Facility: CLINIC | Age: 44
End: 2024-08-09
Payer: COMMERCIAL

## 2024-08-09 VITALS
BODY MASS INDEX: 22.07 KG/M2 | WEIGHT: 124.56 LBS | SYSTOLIC BLOOD PRESSURE: 124 MMHG | HEART RATE: 86 BPM | HEIGHT: 63 IN | DIASTOLIC BLOOD PRESSURE: 88 MMHG

## 2024-08-09 DIAGNOSIS — Z98.890 STATUS POST FUNCTIONAL ENDOSCOPIC SINUS SURGERY (FESS): ICD-10-CM

## 2024-08-09 DIAGNOSIS — J32.9 CHRONIC SINUSITIS, UNSPECIFIED LOCATION: ICD-10-CM

## 2024-08-09 DIAGNOSIS — M54.2 CERVICALGIA: Primary | ICD-10-CM

## 2024-08-09 PROCEDURE — 99999 PR PBB SHADOW E&M-EST. PATIENT-LVL IV: CPT | Mod: PBBFAC,,, | Performed by: STUDENT IN AN ORGANIZED HEALTH CARE EDUCATION/TRAINING PROGRAM

## 2024-08-09 PROCEDURE — 3008F BODY MASS INDEX DOCD: CPT | Mod: CPTII,S$GLB,, | Performed by: STUDENT IN AN ORGANIZED HEALTH CARE EDUCATION/TRAINING PROGRAM

## 2024-08-09 PROCEDURE — 1160F RVW MEDS BY RX/DR IN RCRD: CPT | Mod: CPTII,S$GLB,, | Performed by: STUDENT IN AN ORGANIZED HEALTH CARE EDUCATION/TRAINING PROGRAM

## 2024-08-09 PROCEDURE — 99213 OFFICE O/P EST LOW 20 MIN: CPT | Mod: 25,S$GLB,, | Performed by: STUDENT IN AN ORGANIZED HEALTH CARE EDUCATION/TRAINING PROGRAM

## 2024-08-09 PROCEDURE — 3079F DIAST BP 80-89 MM HG: CPT | Mod: CPTII,S$GLB,, | Performed by: STUDENT IN AN ORGANIZED HEALTH CARE EDUCATION/TRAINING PROGRAM

## 2024-08-09 PROCEDURE — 3044F HG A1C LEVEL LT 7.0%: CPT | Mod: CPTII,S$GLB,, | Performed by: STUDENT IN AN ORGANIZED HEALTH CARE EDUCATION/TRAINING PROGRAM

## 2024-08-09 PROCEDURE — 3074F SYST BP LT 130 MM HG: CPT | Mod: CPTII,S$GLB,, | Performed by: STUDENT IN AN ORGANIZED HEALTH CARE EDUCATION/TRAINING PROGRAM

## 2024-08-09 PROCEDURE — 31231 NASAL ENDOSCOPY DX: CPT | Mod: S$GLB,,, | Performed by: STUDENT IN AN ORGANIZED HEALTH CARE EDUCATION/TRAINING PROGRAM

## 2024-08-09 PROCEDURE — 1159F MED LIST DOCD IN RCRD: CPT | Mod: CPTII,S$GLB,, | Performed by: STUDENT IN AN ORGANIZED HEALTH CARE EDUCATION/TRAINING PROGRAM

## 2024-08-09 RX ORDER — BLACK COHOSH ROOT 200 MG
CAPSULE ORAL
COMMUNITY
Start: 2024-08-04

## 2024-08-10 NOTE — PROGRESS NOTES
Subjective:      Wanda is a 44 y.o. female who comes for follow-up of sinusitis.  Her last visit with me was on 12/22/2023.  At her last visit she was found to have a sinus infection.  Cultures showed Serratia marcescens, Escherichia coli & Pseudomonas aeruginosa.  She was treated with a course of Cipro and a course of topical gentamicin was provided.  She was had another sinus infection since that was treated with azithromycin.  She does still reports some sinus pressure.  She interestingly has been evaluated by physical therapist and noted to have some spine deformity.  With some maneuvers for neck she reports significant improvement in her facial pressure and pain.  Physical therapist is recommending an MRI for further evaluation.    Her current sinus regime consists of: Nasal saline & budesonide.     The assessment of quality and severity of symptoms as measured by the SNOT-22 score is deferred.    The patient's medications, allergies, past medical, surgical, social and family histories were reviewed and updated as appropriate.    Review of Systems   Constitutional:  Positive for malaise/fatigue.   HENT:  Positive for nosebleeds.    Eyes: Negative.    Respiratory: Negative.     Cardiovascular: Negative.    Gastrointestinal: Negative.    Genitourinary: Negative.    Musculoskeletal:  Positive for back pain and neck pain.   Skin: Negative.    Neurological:  Positive for dizziness, tremors and headaches.   Psychiatric/Behavioral:  Positive for depression. The patient is nervous/anxious.       Answers submitted by the patient for this visit:  Review of Symptoms Questionnaire  (Submitted on 8/9/2024)  Ear infection(s)?: Yes  facial swelling: Yes  sinus pressure : Yes  Sinus infection(s)?: Yes  postnasal drip: Yes  trouble swallowing: Yes  Irregular heartbeat?: Yes  Food Allergies?: Yes  Seasonal Allergies?: Yes  None of these : Yes  Light-headedness: Yes  swollen glands: Yes  decreased concentration:  "Yes  sleep disturbance: Yes    A detailed review of systems was obtained with pertinent positives as per the above HPI, and otherwise negative.        Objective:     /88 (BP Location: Right arm, Patient Position: Sitting, BP Method: Medium (Automatic))   Pulse 86   Ht 5' 3" (1.6 m)   Wt 56.5 kg (124 lb 9 oz)   LMP 06/18/2024   BMI 22.06 kg/m²        Constitutional:   Vital signs are normal. She appears well-developed. Normal speech.      Head:  Normocephalic and atraumatic.     Ears:    Right Ear: No drainage or tenderness. No middle ear effusion.   Left Ear: No drainage or tenderness.  No middle ear effusion.     Mouth/Throat  Oropharynx clear and moist without lesions or asymmetry and normal uvula midline. No trismus. No oropharyngeal exudate. Mirror exam not performed due to patient tolerance.  Mirror exam not performed due to patient tolerance.      Neck:  Neck normal without thyromegaly masses, asymmetry, normal tracheal structure, crepitus, and tenderness, thyroid normal and trachea normal.     Pulmonary/Chest:   Effort normal.     Psychiatric:   She has a normal mood and affect. Her speech is normal.     Skin:   No abrasions, lacerations, lesions, or rashes.       Procedure    Nasal endoscopy performed.  See procedure note.    Nasal Endoscopy:  8/9/2024    The use of diagnostic nasal endoscopy was considered medically necessary for the evaluation and visualization of the nasal anatomy for symptoms suggestive of nasal or sinus origin. Physical examination (including a nasal speculum evaluation) did not provide sufficient clinical information to establish a diagnosis, or symptoms did not improve or worsened following treatment.     The nasal cavity was decongested with topical 1% phenylephrine and anesthetized with 4% lidocaine.  A rigid 0-degree endoscope was introduced into the nasal cavity.    The patient was seated in the examination chair. After discussion of risks and benefits, a nasal " "endoscope was inserted into the nose the endoscope was passed along the left nasal floor to the nasopharynx. It was then passed between the middle and superior meatus, nasal turbinates, nasal septum, nasopharynx and sphenoethmoid region. The nasal endoscope was withdrawn and there was no complications. An identical procedure was performed on the right side. I was present for the entire procedure.The patient tolerated the above procedure well. The findings of this procedure can be found in the dictated note from 8/9/2024 visit.      Widely patent sinuses bilaterally.  No significant purulence noted in the maxillary sinuses or frontal sinuses.    Data Reviewed    WBC (K/uL)   Date Value   10/07/2022 6.80     Eosinophil % (%)   Date Value   10/07/2022 0.6     Eos # (K/uL)   Date Value   10/07/2022 0.0     Platelets (K/uL)   Date Value   10/07/2022 235     Glucose (mg/dL)   Date Value   10/26/2021 87     No results found for: "IGE"    I independently reviewed the images of the CT sinuses dated 12/15/23. Pertinent findings include midline nasal septum. Widely patient sinuses except some mild mucosal thickening of the right maxillary sinus.    Assessment:     1. Cervicalgia    2. Status post functional endoscopic sinus surgery (FESS)    3. Chronic sinusitis, unspecified location      Plan:     - Cont sinus irrigations.   - MRI with and without contrast of the cervical spine was ordered.    Delvis Galloway MD     "

## 2024-08-19 ENCOUNTER — HOSPITAL ENCOUNTER (OUTPATIENT)
Dept: RADIOLOGY | Facility: HOSPITAL | Age: 44
Discharge: HOME | End: 2024-08-19
Attending: STUDENT IN AN ORGANIZED HEALTH CARE EDUCATION/TRAINING PROGRAM
Payer: COMMERCIAL

## 2024-08-19 DIAGNOSIS — M54.2 CERVICALGIA: ICD-10-CM

## 2024-08-19 PROCEDURE — 72156 MRI NECK SPINE W/O & W/DYE: CPT | Mod: 26,,, | Performed by: RADIOLOGY

## 2024-08-19 PROCEDURE — A9585 GADOBUTROL INJECTION: HCPCS | Mod: PO | Performed by: STUDENT IN AN ORGANIZED HEALTH CARE EDUCATION/TRAINING PROGRAM

## 2024-08-19 PROCEDURE — 72156 MRI NECK SPINE W/O & W/DYE: CPT | Mod: TC,PO

## 2024-08-19 PROCEDURE — 25500020 PHARM REV CODE 255: Mod: PO | Performed by: STUDENT IN AN ORGANIZED HEALTH CARE EDUCATION/TRAINING PROGRAM

## 2024-08-19 RX ORDER — GADOBUTROL 604.72 MG/ML
5.5 INJECTION INTRAVENOUS
Status: COMPLETED | OUTPATIENT
Start: 2024-08-19 | End: 2024-08-19

## 2024-08-19 RX ADMIN — GADOBUTROL 5.5 ML: 604.72 INJECTION INTRAVENOUS at 01:08

## 2024-08-20 ENCOUNTER — PATIENT MESSAGE (OUTPATIENT)
Dept: NEUROSURGERY | Facility: CLINIC | Age: 44
End: 2024-08-20
Payer: COMMERCIAL

## 2024-08-21 ENCOUNTER — PATIENT MESSAGE (OUTPATIENT)
Dept: NEUROLOGY | Facility: CLINIC | Age: 44
End: 2024-08-21
Payer: COMMERCIAL

## 2024-08-25 ENCOUNTER — PATIENT MESSAGE (OUTPATIENT)
Dept: OBSTETRICS AND GYNECOLOGY | Facility: CLINIC | Age: 44
End: 2024-08-25
Payer: COMMERCIAL

## 2024-08-25 DIAGNOSIS — B37.31 YEAST VAGINITIS: ICD-10-CM

## 2024-08-26 RX ORDER — FLUCONAZOLE 150 MG/1
150 TABLET ORAL ONCE
Qty: 1 TABLET | Refills: 0 | Status: SHIPPED | OUTPATIENT
Start: 2024-08-26 | End: 2024-08-26

## 2024-08-27 ENCOUNTER — OFFICE VISIT (OUTPATIENT)
Dept: NEUROSURGERY | Facility: CLINIC | Age: 44
End: 2024-08-27
Payer: COMMERCIAL

## 2024-08-27 DIAGNOSIS — M48.02 CERVICAL STENOSIS OF SPINAL CANAL: ICD-10-CM

## 2024-08-27 DIAGNOSIS — M54.12 CERVICAL RADICULOPATHY: Primary | ICD-10-CM

## 2024-08-27 PROCEDURE — 1159F MED LIST DOCD IN RCRD: CPT | Mod: CPTII,95,, | Performed by: NURSE PRACTITIONER

## 2024-08-27 PROCEDURE — 3044F HG A1C LEVEL LT 7.0%: CPT | Mod: CPTII,95,, | Performed by: NURSE PRACTITIONER

## 2024-08-27 PROCEDURE — 1160F RVW MEDS BY RX/DR IN RCRD: CPT | Mod: CPTII,95,, | Performed by: NURSE PRACTITIONER

## 2024-08-27 PROCEDURE — 99213 OFFICE O/P EST LOW 20 MIN: CPT | Mod: 95,,, | Performed by: NURSE PRACTITIONER

## 2024-08-27 NOTE — PROGRESS NOTES
Neurosurgery  Established Patient    SUBJECTIVE:   Established Patient - Telehealth Visit     The patient location is: Home  The chief complaint leading to consultation is: MRI Results  Visit type: Virtual visit with audio and video  Total time spent with patient: 12 minutes     Each patient to whom I provide medical services by telemedicine is:  (1) informed of the relationship between the physician and patient and the respective role of any other health care provider with respect to management of the patient; and (2) notified that they may decline to receive medical services by telemedicine and may withdraw from such care at any time. Patient verbally consented to receive this service via voice-only telephone call.     This service was not originating from a related E/M service provided within the previous 7 days nor will  to an E/M service or procedure within the next 24 hours or my soonest available appointment.  Prevailing standard of care was able to be met in this audio-only visit.       Interval Hx 8/27/24: After the last appointment with Dr. Gonzalez an updated MRI cervical spine to further evaluate her concerns. States that her symptoms remain unchanged and she is frustrated as she has exhausted conservative treatment options without improvement in her pain. The pain affects her quality of life.     INTERVAL HISTORY (2/6/24) per Dr. Gonzalez's note:  Virtual visit for continued surveillance of pituitary cyst.  Most recent MRI shows slightly decreased size of cyst.  Clinically she does not report any signs or symptoms attributable to the cyst however she continues to have persistent chronic daily head pressure that continues to evade diagnosis.  She recently underwent a dental procedure in which her gums and palate were anesthetized.  She reports that she had complete pain relief and resolution of her head pressure for approximately 1 hour.  She also reports a similar effect after having her sinus membranes  "anesthetized during a sinus procedure which led to complete relief for approximately 3 days.  She follows up with Dr. Galloway for her chronic sinusitis and has a headache neurologist.  She underwent Botox injections in the past but had to stop due to worsening symptoms.    INTERVAL HISTORY (12/20/22):  Continued surveillance of pituitary cyst.  Patient recently underwent endonasal endoscopic sinus procedure for chronic sinusitis and infections.  She and her  report that her headaches and functionality have improved since that procedure.  However she recently contracted COVID and has a return of her headaches.  Her new updated MRI does not show any change in the size or characteristics of the pituitary cyst.     HPI:  Wanda Singleton is a 42 y.o.  female who is referred to me for evaluation of incidentally discovered pituitary cyst.  Cyst has remained stable over 2 MRIs  by 1 month.  This was discovered after developing a constellation of symptoms including headache, blurry vision, fatigue, dizziness that have progressively worsened.  She is typically very active (rides bike, yoga) but is now unable to do 2/2 sxs.       Started with sinus pressure in Jan 1, 2021 and was treated for allergies but did not help sxs.  This progressed to "beating" sensation in her head that correlated with her heartbeat.  This progressed to headache that involves whole and radiates from back around front bilaterally.  Currently a combination of pain and pressure.  She also c/o throat pain and feels like "someone is choking her."  She also reports dizziness and was eval by ENT and vertigo was r/o  She attended PT for vertigo but did not help.  She reports falling over and having difficulty getting up requiring her partner to pick her up.  Symptoms are worse on her period.  She was seen by neurology who diagnosed her with a headache.  Fioricet is only thing that helps minimally.     She has endometriosis with heavy " periods with only 2 weeks off in between.      Review of patient's allergies indicates:   Allergen Reactions    Adhesive Blisters, Hives, Itching, Rash and Swelling    Bee sting kit Hives, Itching, Nausea Only and Swelling    Fluticasone Anxiety, Other (See Comments), Palpitations and Shortness Of Breath    Guaifenesin Anxiety, Hallucinations, Palpitations and Shortness Of Breath    Horseradish root extract Anaphylaxis, Hives, Nausea And Vomiting, Palpitations, Shortness Of Breath and Swelling    Bleach (sodium hypochlorite)     Horseradish     Levofloxacin     Oxycodone Hives and Itching    Latex, natural rubber Hives, Itching and Rash    Penicillins Hives and Nausea And Vomiting    Sulfa (sulfonamide antibiotics) Anxiety, Diarrhea, Hives, Itching, Nausea And Vomiting and Palpitations       Current Outpatient Medications   Medication Sig Dispense Refill    albuterol (PROVENTIL/VENTOLIN HFA) 90 mcg/actuation inhaler Inhale 2 puffs into the lungs every 6 (six) hours as needed.      ascorbic acid, vitamin C, (VITAMIN C) 500 MG tablet Take 500 mg by mouth once daily.      azelastine (ASTELIN) 137 mcg (0.1 %) nasal spray 1 spray (137 mcg total) by Nasal route 2 (two) times daily. 30 mL 3    black cohosh 200 mg Cap       carBAMazepine (TEGRETOL XR) 100 MG 12 hr tablet Take 1 tablet (100 mg total) by mouth 2 (two) times daily. 60 tablet 11    diphenhydrAMINE (BENADRYL) 25 mg capsule Take 1 capsule by mouth every evening.      ibuprofen (ADVIL,MOTRIN) 800 MG tablet Take 1 tablet (800 mg total) by mouth every 6 (six) hours as needed for Pain. Alternate with tylenol every 3 hours as needed. 20 tablet 0    melatonin 5 mg Chew Take 1 tablet by mouth every evening.      NURTEC 75 mg odt Take 1 tablet (75 mg total) by mouth daily as needed for Migraine. Place ODT tablet on the tongue; alternatively the ODT tablet may be placed under the tongue 12 tablet 2    nystatin-triamcinolone (MYCOLOG II) cream Apply to affected area 2  times daily for 7 days (Patient not taking: Reported on 5/10/2024) 30 g 1    pregabalin (LYRICA) 50 MG capsule Take 1 capsule (50 mg total) by mouth 2 (two) times daily. 60 capsule 2    progesterone (PROMETRIUM) 100 MG capsule Take 1 capsule (100 mg total) by mouth nightly. 30 capsule 11    VITAMIN B COMPLEX ORAL Take 1 tablet by mouth once daily.      vitamin D (VITAMIN D3) 1000 units Tab Take 1,000 Units by mouth once daily.       No current facility-administered medications for this visit.     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      Past Surgical History:   Procedure Laterality Date    ETHMOIDECTOMY  7/21/2022    Procedure: ETHMOIDECTOMY;  Surgeon: Delvis Galloway MD;  Location: [facility] OR [address] [place] [address];  Service: ENT;;    FRONTAL SINUS OBLITERATION  7/21/2022    Procedure: SINUSOTOMY, FRONTAL SINUS, OBLITERATIVE;  Surgeon: Delvis Galloway MD;  Location: [facility] OR [address] [place] [address];  Service: ENT;;    FUNCTIONAL ENDOSCOPIC SINUS SURGERY (FESS) USING COMPUTER-ASSISTED NAVIGATION Bilateral 7/21/2022    Procedure: FESS, USING COMPUTER-ASSISTED NAVIGATION;  Surgeon: Delvis Galloway MD;  Location: [address];  Service: ENT;  Laterality: Bilateral;    LAPAROSCOPY      x2 for endometriosis    LEG SURGERY      x8    MAXILLARY ANTROSTOMY  7/21/2022    Procedure: MAXILLARY ANTROSTOMY;  Surgeon: Delvis Galloway MD;  Location: [facility] OR [address] [place] [address];  Service: ENT;;     Family History    None       Social History     Socioeconomic History    Marital status:    Tobacco Use    Smoking status: Every Day     Current packs/day: 0.50     Types: Cigarettes     Smokeless tobacco: Never   Substance and Sexual Activity    Alcohol use: Not Currently    Drug use: Not Currently    Sexual activity: Yes     Partners: Male     Social Determinants of Health     Financial Resource Strain: Low Risk  (1/30/2024)    Overall Financial Resource Strain (CARDIA)     Difficulty of Paying Living Expenses: Not hard at all   Food Insecurity: No Food Insecurity (1/30/2024)    Hunger Vital Sign     Worried About Running Out of Food in the Last Year: Never true     Ran Out of Food in the Last Year: Never true   Transportation Needs: No Transportation Needs (1/30/2024)    PRAPARE - Transportation     Lack of Transportation (Medical): No     Lack of Transportation (Non-Medical): No   Physical Activity: Sufficiently Active (1/30/2024)    Exercise Vital Sign     Days of Exercise per Week: 4 days     Minutes of Exercise per Session: 60 min   Stress: Stress Concern Present (1/30/2024)    [employer] [place] [employer] - Occupational Stress Questionnaire     Feeling of Stress : To some extent       Review of Systems    OBJECTIVE:     Vital Signs     There is no height or weight on file to calculate BMI.    Neurosurgery Physical Exam  General: well developed, well nourished, no distress.   Head: normocephalic  Neurologic: Alert and oriented. Thought content appropriate.  GCS: Motor: 6/Verbal: 5/Eyes: 4 GCS Total: 15  Mental Status: Awake, Alert, Oriented x 4  Language: No aphasia  Speech: No dysarthria  Cranial nerves: CN II-XII grossly intact.   Eyes: EOMI appear grossly intact  Pulmonary: no signs of respiratory distress  Motor Strength:Moves all extremities spontaneously with good tone. BUE and BLE are at least 3/5. No abnormal movements seen.     Diagnostic Results:  I have personally reviewed the MRI cervical spine dated 8/19/24 with Dr. Gonzalez. The imaging shows mild multilevel degenerative changes most pronounced at C5-6 and C6-7 with central and neural foraminal narrowing.      ASSESSMENT/PLAN:     Wanda Singleton is a 44 y.o. female seen virtually today to discuss her recent MRI cervical spine findings. Dr. Gonzalez after reviewing the imaging does not recommend surgery at this time. I have ordered an EMG and dynamic x-ray for further evaluation of her concerns.   The patient has requested to follow-up with Dr. Gonzalez to discuss further. I have encouraged her to contact the clinic with any questions, concerns, or adverse clinical changes. She verbalized understanding.      ANNA Coleman-KOKO  Neurosurgery  [facility].      Note dictated with voice recognition software, please excuse any grammatical errors.

## 2024-08-28 ENCOUNTER — PATIENT MESSAGE (OUTPATIENT)
Dept: OTOLARYNGOLOGY | Facility: CLINIC | Age: 44
End: 2024-08-28
Payer: COMMERCIAL

## 2024-08-29 ENCOUNTER — PATIENT MESSAGE (OUTPATIENT)
Dept: OTOLARYNGOLOGY | Facility: HOSPITAL | Age: 44
End: 2024-08-29
Payer: COMMERCIAL

## 2024-08-29 DIAGNOSIS — M54.2 CERVICALGIA: Primary | ICD-10-CM

## 2024-08-30 ENCOUNTER — PROCEDURE VISIT (OUTPATIENT)
Dept: OBSTETRICS AND GYNECOLOGY | Facility: CLINIC | Age: 44
End: 2024-08-30
Payer: COMMERCIAL

## 2024-08-30 VITALS — WEIGHT: 125 LBS | SYSTOLIC BLOOD PRESSURE: 106 MMHG | BODY MASS INDEX: 22.14 KG/M2 | DIASTOLIC BLOOD PRESSURE: 78 MMHG

## 2024-08-30 DIAGNOSIS — N80.9 ENDOMETRIOSIS: Primary | ICD-10-CM

## 2024-08-30 DIAGNOSIS — B37.31 YEAST VAGINITIS: ICD-10-CM

## 2024-08-30 DIAGNOSIS — N92.6 IRREGULAR BLEEDING: ICD-10-CM

## 2024-08-30 DIAGNOSIS — Z32.02 NEGATIVE PREGNANCY TEST: ICD-10-CM

## 2024-08-30 LAB
B-HCG UR QL: NEGATIVE
CTP QC/QA: YES

## 2024-08-30 PROCEDURE — 58100 BIOPSY OF UTERUS LINING: CPT | Mod: S$GLB,,, | Performed by: OBSTETRICS & GYNECOLOGY

## 2024-08-30 PROCEDURE — 81025 URINE PREGNANCY TEST: CPT | Mod: S$GLB,,, | Performed by: OBSTETRICS & GYNECOLOGY

## 2024-08-30 PROCEDURE — 99214 OFFICE O/P EST MOD 30 MIN: CPT | Mod: 25,S$GLB,, | Performed by: OBSTETRICS & GYNECOLOGY

## 2024-08-30 RX ORDER — CLOTRIMAZOLE AND BETAMETHASONE DIPROPIONATE 10; .64 MG/G; MG/G
CREAM TOPICAL 2 TIMES DAILY
Qty: 15 G | Refills: 0 | Status: SHIPPED | OUTPATIENT
Start: 2024-08-30

## 2024-08-30 RX ORDER — FLUCONAZOLE 150 MG/1
150 TABLET ORAL ONCE
COMMUNITY
Start: 2024-08-26

## 2024-08-30 NOTE — PROGRESS NOTES
Chief Complaint   Patient presents with    embx       HPI:   44 y.o.  here today for EMBx and to discuss surgical management of CPP/endometriosis. Notes irregular bleeding that has been present for the past several years. Mostly spotting.       Bleeding history:  In the past 6 months menstrual bleeding has decreased, periods are spacing out, cycles are getting lighter, heavy for 1 day instead of 6, however she still c/o intermenstrual spotting (US done last year was normal with EMS 4 mm, 3-4 cm endometrioma on ROV), reports spotting almost every day requiring the use of a pad, blood is occasionally brownish like old blood, occasionally light pink spotting. Family history of early menopause, however hormone workup normal. Was prescribed Aygestin last year for endometriosis/AUB but did not like the side effects and stopped taking it. Was also prescribed oral Prometrium and did not like side effects, stopped taking it.     Suffers from recurrent yeast infections.     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      Past Surgical History:   Procedure Laterality Date    ETHMOIDECTOMY  2022    Procedure: ETHMOIDECTOMY;  Surgeon: Delvis Galloway MD;  Location: [facility] OR [address] [place] [address];  Service: ENT;;    FRONTAL SINUS OBLITERATION  2022    Procedure: SINUSOTOMY, FRONTAL SINUS, OBLITERATIVE;  Surgeon: Delvis Galloway MD;  Location: [facility] OR [address] [place] [address];  Service: ENT;;    FUNCTIONAL ENDOSCOPIC SINUS SURGERY (FESS) USING COMPUTER-ASSISTED NAVIGATION Bilateral 2022    Procedure: FESS, USING COMPUTER-ASSISTED NAVIGATION;  Surgeon: Delvis Galloway MD;  Location: [facility] OR [address] [place] [address];  Service: ENT;  Laterality: Bilateral;     LAPAROSCOPY      x2 for endometriosis    LEG SURGERY      x8    MAXILLARY ANTROSTOMY  7/21/2022    Procedure: MAXILLARY ANTROSTOMY;  Surgeon: Delvis Galloway MD;  Location: [facility] OR [address] [place] [address];  Service: ENT;;       Current Outpatient Medications:     ascorbic acid, vitamin C, (VITAMIN C) 500 MG tablet, Take 500 mg by mouth once daily., Disp: , Rfl:     fluconazole (DIFLUCAN) 150 MG Tab, Take 150 mg by mouth once., Disp: , Rfl:     VITAMIN B COMPLEX ORAL, Take 1 tablet by mouth once daily., Disp: , Rfl:     vitamin D (VITAMIN D3) 1000 units Tab, Take 1,000 Units by mouth once daily., Disp: , Rfl:     albuterol (PROVENTIL/VENTOLIN HFA) 90 mcg/actuation inhaler, Inhale 2 puffs into the lungs every 6 (six) hours as needed. (Patient not taking: Reported on 8/30/2024), Disp: , Rfl:     carBAMazepine (TEGRETOL XR) 100 MG 12 hr tablet, Take 1 tablet (100 mg total) by mouth 2 (two) times daily., Disp: 60 tablet, Rfl: 11    clotrimazole-betamethasone 1-0.05% (LOTRISONE) cream, Apply topically 2 (two) times daily., Disp: 15 g, Rfl: 0    diphenhydrAMINE (BENADRYL) 25 mg capsule, Take 1 capsule by mouth every evening. (Patient not taking: Reported on 8/30/2024), Disp: , Rfl:     ibuprofen (ADVIL,MOTRIN) 800 MG tablet, Take 1 tablet (800 mg total) by mouth every 6 (six) hours as needed for Pain. Alternate with tylenol every 3 hours as needed. (Patient not taking: Reported on 8/30/2024), Disp: 20 tablet, Rfl: 0    melatonin 5 mg Chew, Take 1 tablet by mouth every evening. (Patient not taking: Reported on 8/30/2024), Disp: , Rfl:     NURTEC 75 mg odt, Take 1 tablet (75 mg total) by mouth daily as needed for Migraine. Place ODT tablet on the tongue; alternatively the ODT tablet may be placed under the tongue (Patient not taking: Reported on 8/30/2024), Disp: 12 tablet, Rfl: 2    nystatin-triamcinolone (MYCOLOG II) cream, Apply to affected area 2 times daily for 7 days (Patient not taking: Reported on 5/10/2024), Disp: 30 g, Rfl: 1     pregabalin (LYRICA) 50 MG capsule, Take 1 capsule (50 mg total) by mouth 2 (two) times daily., Disp: 60 capsule, Rfl: 2    progesterone (PROMETRIUM) 100 MG capsule, Take 1 capsule (100 mg total) by mouth nightly., Disp: 30 capsule, Rfl: 11  No current facility-administered medications for this visit.    Facility-Administered Medications Ordered in Other Visits:     haloperidol lactate injection 0.5 mg, 0.5 mg, Intravenous, Q10 Min PRN, Popeye Phelps MD  Review of patient's allergies indicates:   Allergen Reactions    Adhesive Blisters, Hives, Itching, Rash and Swelling    Bee sting kit Hives, Itching, Nausea Only and Swelling    Fluticasone Anxiety, Other (See Comments), Palpitations and Shortness Of Breath    Guaifenesin Anxiety, Hallucinations, Palpitations and Shortness Of Breath    Horseradish root extract Anaphylaxis, Hives, Nausea And Vomiting, Palpitations, Shortness Of Breath and Swelling    Bleach (sodium hypochlorite)     Horseradish     Levofloxacin     Oxycodone Hives and Itching    Latex, natural rubber Hives, Itching and Rash    Penicillins Hives and Nausea And Vomiting    Sulfa (sulfonamide antibiotics) Anxiety, Diarrhea, Hives, Itching, Nausea And Vomiting and Palpitations     OB History    Para Term  AB Living   0 0 0         SAB IAB Ectopic Multiple Live Births                 Social History     Tobacco Use    Smoking status: Every Day     Current packs/day: 0.50     Types: Cigarettes    Smokeless tobacco: Never   Substance Use Topics    Alcohol use: Not Currently    Drug use: Not Currently     Family History   Problem Relation Name Age of Onset    Breast cancer Neg Hx      Colon cancer Neg Hx      Ovarian cancer Neg Hx         Review of Systems   Negative except as in HPI    Physical Exam   Vitals:    24 1432   BP: 106/78     Body mass index is 22.14 kg/m².    Physical Exam  Constitutional:       General: She is not in acute distress.     Appearance: Normal appearance.      Genitourinary:    Vulva, vagina, uterus, right adnexa, left adnexa and urethral meatus normal.   The external female genitalia was normal.   No external genitalia lesions identified,Genitalia hair distrobution normal .   No vaginal discharge or bleeding in the vagina.    No vaginal prolapse present.     No vaginal atrophy present.  Right adnexum displays no mass, no tenderness and no fullness. Left adnexum displays no mass, no tenderness and no fullness. Cervix is normal. Cervix exhibits no motion tenderness and no lesion. Uterus consistancy normal and Uerus contour normal  Uterus is not tender and no mass.    Uterus is anteverted.   HENT:      Head: Normocephalic and atraumatic.   Eyes:      Extraocular Movements: Extraocular movements intact.      Pupils: Pupils are equal, round, and reactive to light.   Pulmonary:      Effort: Pulmonary effort is normal.   Abdominal:      General: Abdomen is flat. There is no distension.      Palpations: Abdomen is soft.      Tenderness: There is no abdominal tenderness. There is no guarding or rebound.   Musculoskeletal:         General: Normal range of motion.      Cervical back: Normal range of motion.   Neurological:      General: No focal deficit present.      Mental Status: She is alert and oriented to person, place, and time.   Skin:     General: Skin is warm and dry.   Psychiatric:         Mood and Affect: Mood normal.         Behavior: Behavior normal.         Thought Content: Thought content normal.   Vitals reviewed.       Labs reviewed: Pap, HPV, MMG    ASSESSMENT:   Patient Active Problem List   Diagnosis    Pituitary cyst    Headache    Visual changes    Intractable migraine without aura and with status migrainosus    Sinus pressure    Chronic sinusitis    Endometriosis    Anosmia    Fatigue    Vulvar atrophy    Perimenopause    Irregular bleeding       PLAN:  Problem List Items Addressed This Visit          Renal/    Endometriosis - Primary    Relevant Orders     US Pelvis Comp with Transvag NON-OB (xpd    Case Request Operating Room: HYSTERECTOMY,TOTAL,LAPAROSCOPIC,WITH SALPINGECTOMY (Completed)    Irregular bleeding    Relevant Orders    US Pelvis Comp with Transvag NON-OB (xpd    Case Request Operating Room: HYSTERECTOMY,TOTAL,LAPAROSCOPIC,WITH SALPINGECTOMY (Completed)    Endometrial biopsy (Completed)     Other Visit Diagnoses       Negative pregnancy test        Relevant Orders    POCT Urine Pregnancy (Completed)    Yeast vaginitis        Relevant Medications    clotrimazole-betamethasone 1-0.05% (LOTRISONE) cream             Total time spent on this encounter was 23 minutes.  This includes preparing to see the patient;  obtaining/reviewing separately obtained history;  performing a medical exam and/or evaluation;   counseling/educating the patient;  ordering medications, tests, of procedures;   referring/communicating with other health care professionals;  EMR documentation;  interpreting/communicating results to the patient;  and/or care coordination.    Follow up in about 4 weeks (around 9/27/2024) for Preop.       Yenny Dang MD  Department of Obstetrics & Gynecology  [facility]

## 2024-09-03 ENCOUNTER — TELEPHONE (OUTPATIENT)
Dept: OBSTETRICS AND GYNECOLOGY | Facility: CLINIC | Age: 44
End: 2024-09-03
Payer: COMMERCIAL

## 2024-09-03 ENCOUNTER — PATIENT MESSAGE (OUTPATIENT)
Dept: OBSTETRICS AND GYNECOLOGY | Facility: CLINIC | Age: 44
End: 2024-09-03
Payer: COMMERCIAL

## 2024-09-03 PROBLEM — Z01.419 ENCOUNTER FOR WELL WOMAN EXAM WITH ROUTINE GYNECOLOGICAL EXAM: Status: RESOLVED | Noted: 2024-05-10 | Resolved: 2024-09-03

## 2024-09-03 NOTE — PROCEDURES
Endometrial biopsy    Date/Time: 8/30/2024 2:30 PM    Performed by: Yenny Dang MD  Authorized by: Yenny Dang MD    Consent:     Consent given by:  Patient    Patient questions answered: yes      Patient agrees, verbalizes understanding, and wants to proceed: yes    Indication:     Indications: Other disorder of menstruation and other abnormal bleeding from female genital tract    Pre-procedure:     Pre-procedure timeout performed: yes    Procedure:     Procedure: endometrial biopsy with Pipelle      Cervix cleaned and prepped: yes      A paracervical block was performed: no      An intracervical block was performed: no      The cervix was dilated: no      Uterus sounded: no      Curettes used:  1    Specimen collected: insufficient sample size      Patient tolerated procedure well with no complications: yes      Unable to perform due to: cervical stenosis      Procedure:  Endometrial biopsy    Diagnosis: AUB/CPP    The risks, benefits, and alternatives were discussed prior to the procedure. Patient signed consents.    UPT: Negative    Procedure note:  The speculum was placed and the cervix prepped with hibiclens.  The anterior lip of the cervix was grasped with a single tooth tenaculum.  The endometrial pipelle was unable to be inserted past a depth of 4 cm, and os finder unable to be inserted either. No tissue was collected. The instruments were removed. Pressure was applied for hemostasis. There were no complications.    Patient desires definitive management of AUB with hysterectomy. EMBx attempted but unsuccessful today due to cervical stenosis. Will repeat TVUS and bring patient back for preop visit to sign consents in early October. Case request placed today.     Assessment and Plan:  Endometriosis  -     US Pelvis Comp with Transvag NON-OB (xpd; Future; Expected date: 08/30/2024  -     Case Request Operating Room: HYSTERECTOMY,TOTAL,LAPAROSCOPIC,WITH SALPINGECTOMY    Negative pregnancy test  -      POCT Urine Pregnancy    Irregular bleeding  -     US Pelvis Comp with Transvag NON-OB (xpd; Future; Expected date: 08/30/2024  -     Case Request Operating Room: HYSTERECTOMY,TOTAL,LAPAROSCOPIC,WITH SALPINGECTOMY    Yeast vaginitis  -     Cancel: Vaginosis Screen by DNA Probe  -     clotrimazole-betamethasone 1-0.05% (LOTRISONE) cream; Apply topically 2 (two) times daily.  Dispense: 15 g; Refill: 0    Other orders  -     Endometrial biopsy      Ibuprofen 600 mg every 6 hours PRN cramping.  Call the office for heavy vaginal bleeding, fever, nausea, vomiting, or severe lower abdominal pain.    Will contact patient with results.      Yenny Dang MD  Department of Obstetrics & Gynecology  [facility]

## 2024-09-03 NOTE — TELEPHONE ENCOUNTER
Called pt and scheduled pre-admit appt. Pt states that she can not come during the week of 10/7-10/11 bc of work.     I spoke w a worker from pre-admit and she stated that 10/4 would be too early bc it is cutting it close w the 2 week window. I explained to pt that we can keep the 10/4 pre-op appt but would need to come to pre-admit closer to her sx date. Pt requesting to keep the appts on separate days since she lives in [place]. Pt scheduled for pre-admit on 10/14 [facility]. Pt satisfied w appts and verbalized understanding.

## 2024-09-04 ENCOUNTER — TELEPHONE (OUTPATIENT)
Dept: NEUROSURGERY | Facility: CLINIC | Age: 44
End: 2024-09-04
Payer: COMMERCIAL

## 2024-09-04 NOTE — TELEPHONE ENCOUNTER
----- Message from Sobeida No sent at 9/3/2024  2:30 PM CDT -----  Regarding: returning missed call  Contact: pt @ [phone]  Missed Callback     Pt returning call from missed call. Requesting to speak with EJ in the office. Please call to further advise. Thank you for all you are doing.

## 2024-09-16 ENCOUNTER — HOSPITAL ENCOUNTER (OUTPATIENT)
Dept: RADIOLOGY | Facility: HOSPITAL | Age: 44
Discharge: HOME | End: 2024-09-16
Attending: NURSE PRACTITIONER
Payer: COMMERCIAL

## 2024-09-16 ENCOUNTER — HOSPITAL ENCOUNTER (OUTPATIENT)
Dept: RADIOLOGY | Facility: HOSPITAL | Age: 44
Discharge: HOME | End: 2024-09-16
Attending: OBSTETRICS & GYNECOLOGY
Payer: COMMERCIAL

## 2024-09-16 DIAGNOSIS — N80.9 ENDOMETRIOSIS: ICD-10-CM

## 2024-09-16 DIAGNOSIS — M54.12 CERVICAL RADICULOPATHY: ICD-10-CM

## 2024-09-16 DIAGNOSIS — M48.02 CERVICAL STENOSIS OF SPINAL CANAL: ICD-10-CM

## 2024-09-16 DIAGNOSIS — N92.6 IRREGULAR BLEEDING: ICD-10-CM

## 2024-09-16 PROCEDURE — 76856 US EXAM PELVIC COMPLETE: CPT | Mod: 26,,, | Performed by: RADIOLOGY

## 2024-09-16 PROCEDURE — 76856 US EXAM PELVIC COMPLETE: CPT | Mod: TC,PO

## 2024-09-16 PROCEDURE — 72050 X-RAY EXAM NECK SPINE 4/5VWS: CPT | Mod: TC,PO

## 2024-09-16 PROCEDURE — 72050 X-RAY EXAM NECK SPINE 4/5VWS: CPT | Mod: 26,,, | Performed by: RADIOLOGY

## 2024-09-16 PROCEDURE — 76830 TRANSVAGINAL US NON-OB: CPT | Mod: TC,PO

## 2024-09-16 PROCEDURE — 76830 TRANSVAGINAL US NON-OB: CPT | Mod: 26,,, | Performed by: RADIOLOGY

## 2024-09-17 DIAGNOSIS — N88.2 CERVICAL STENOSIS (UTERINE CERVIX): ICD-10-CM

## 2024-09-17 DIAGNOSIS — N93.9 ABNORMAL UTERINE BLEEDING (AUB): ICD-10-CM

## 2024-09-17 DIAGNOSIS — N94.89 ENDOMETRIAL MASS: Primary | ICD-10-CM

## 2024-09-17 RX ORDER — MISOPROSTOL 200 UG/1
TABLET ORAL
Qty: 2 TABLET | Refills: 0 | Status: ON HOLD | OUTPATIENT
Start: 2024-09-17 | End: 2024-09-20 | Stop reason: HOSPADM

## 2024-09-17 NOTE — PROGRESS NOTES
Discussed abnormal US results and recommendation to proceed with [facility] D&C for endometrial sampling of possible mass before moving forward with hysterectomy. Spoke with patient and her partner. All questions answered. Will plan for surgery 9/20, case request placed. Will see if phone pre-admit is possible as patient lives in [place].     Will send Rx for Cytotec to take night before and AM of surgery.

## 2024-09-18 ENCOUNTER — ANESTHESIA EVENT (OUTPATIENT)
Dept: SURGERY | Facility: OTHER | Age: 44
End: 2024-09-18
Payer: COMMERCIAL

## 2024-09-19 ENCOUNTER — PATIENT MESSAGE (OUTPATIENT)
Dept: PREADMISSION TESTING | Facility: OTHER | Age: 44
End: 2024-09-19
Payer: COMMERCIAL

## 2024-09-19 ENCOUNTER — ANESTHESIA EVENT (OUTPATIENT)
Dept: SURGERY | Facility: OTHER | Age: 44
End: 2024-09-19
Payer: COMMERCIAL

## 2024-09-19 ENCOUNTER — TELEPHONE (OUTPATIENT)
Dept: OBSTETRICS AND GYNECOLOGY | Facility: CLINIC | Age: 44
End: 2024-09-19
Payer: COMMERCIAL

## 2024-09-19 RX ORDER — MULTIVITAMIN
1 TABLET ORAL DAILY
COMMUNITY

## 2024-09-19 RX ORDER — DIAPER,BRIEF,ADULT, DISPOSABLE
500 EACH MISCELLANEOUS DAILY
COMMUNITY

## 2024-09-19 NOTE — TELEPHONE ENCOUNTER
Called pt to confirm she received the Cytotec Dr. Dang sent in. I reminded pt to take one pill tonight and one pill tomorrow morning. Pt verbalized understanding and stated she had the medication.

## 2024-09-19 NOTE — H&P
[facility]  History & Physical  Gynecology     SUBJECTIVE:      Chief Complaint/Reason for Procedure: Oligomenorrhea, concerning US findings     History of Present Illness:  Patient is a 44 y.o.  with oligomenorrhea, CPP/endometriosis, and US findings concerning for a possible lower uterine segment mass. Unable to sample in the office due to cervical stenosis. Of note, US done last year describes an isoechoic uterine mass along posterior uterine body measuring 12 mm compatible with a fibroid. PMH tobacco use, migraine/chronic HA.       Notes irregular bleeding that has been present for the past several years. Mostly spotting. In the past 6 months menstrual bleeding has decreased, periods are spacing out, cycles are getting lighter, heavy for 1 day instead of 6, however she still c/o intermenstrual spotting (US done last year was normal with EMS 4 mm, 3-4 cm endometrioma on ROV), reports spotting almost every day requiring the use of a pad, blood is occasionally brownish like old blood, occasionally light pink spotting. Family history of early menopause, however hormone workup normal. Was prescribed Aygestin last year for endometriosis/AUB but did not like the side effects and stopped taking it. Was also prescribed oral Prometrium and did not like side effects, stopped taking it.      PSH: diagnostic LSC x2, sinus surgeries     EMBx attempted 24, unable to pass pipelle or os finder past 4 cm.  Pap (2024): NILM/HPV neg  MMG (2024): BIRADS-1   TVUS (2024): Uterus 9x5x5 cm. 1.4 cm x 1 cm oblong hypoechoic mass with internal flow suspicious for endometrial neoplasm in the PEDRO/cervix; multiple fibroids, largest 1.3 cm; EMS 3 mm at the fundus; LOV 4 cm with 2.5 cm simple cyst, 2 cm complex left ovarian cyst with internal echogenic maternal and septation vs two adjacent cysts; ROV not visualized; complex structure in right adnexa suggestive of hydrosalpinx; small volume free fluid in the pelvis           Review of patient's allergies indicates:   Allergen Reactions    Adhesive Blisters, Hives, Itching, Rash and Swelling    Bee sting kit Hives, Itching, Nausea Only and Swelling    Fluticasone Anxiety, Other (See Comments), Palpitations and Shortness Of Breath    Guaifenesin Anxiety, Hallucinations, Palpitations and Shortness Of Breath    Horseradish root extract Anaphylaxis, Hives, Nausea And Vomiting, Palpitations, Shortness Of Breath and Swelling    Bleach (sodium hypochlorite)      Horseradish      Levofloxacin      Oxycodone Hives and Itching    Latex, natural rubber Hives, Itching and Rash    Penicillins Hives and Nausea And Vomiting    Sulfa (sulfonamide antibiotics) Anxiety, Diarrhea, Hives, Itching, Nausea And Vomiting and Palpitations                 OB History    Para Term  AB Living   0 0 0         SAB IAB Ectopic Multiple Live Births                         Past Medical History:   Diagnosis Date    COVID19 long hauler      Endometriosis, unspecified      Malignant melanoma of skin, unspecified      Migraine      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              Past Surgical History:   Procedure Laterality Date    ETHMOIDECTOMY   2022     Procedure: ETHMOIDECTOMY;  Surgeon: Delvis Galloway MD;  Location: [address];  Service: ENT;;    FRONTAL SINUS OBLITERATION   2022     Procedure: SINUSOTOMY, FRONTAL SINUS, OBLITERATIVE;  Surgeon: Delvis Galloway MD;  Location: [address];  Service: ENT;;    FUNCTIONAL ENDOSCOPIC SINUS SURGERY (FESS) USING COMPUTER-ASSISTED NAVIGATION Bilateral 2022     Procedure: FESS, USING COMPUTER-ASSISTED NAVIGATION;  Surgeon: Delvis Galloway MD;  Location: [address];  Service: ENT;  Laterality: Bilateral;    LAPAROSCOPY         x2 for endometriosis    LEG SURGERY         x8    MAXILLARY ANTROSTOMY   2022     Procedure: MAXILLARY ANTROSTOMY;  Surgeon: Delvis Galloway MD;  Location: [address];  Service: ENT;;              Family History   Problem Relation Name Age of Onset    Breast cancer Neg Hx        Colon cancer Neg Hx        Ovarian cancer Neg Hx          Social History   Social History            Tobacco Use    Smoking status: Every Day       Current packs/day: 0.50       Types: Cigarettes    Smokeless tobacco: Never   Substance Use Topics    Alcohol use: Not Currently    Drug use: Not Currently                Outpatient Medications Marked as Taking for the 8/30/24 encounter (Procedure visit) with Yenny Dang MD   Medication Sig Dispense Refill    ascorbic acid, vitamin C, (VITAMIN C) 500 MG tablet Take 500 mg by mouth once daily.        fluconazole (DIFLUCAN) 150 MG Tab Take 150 mg by mouth once.        VITAMIN B COMPLEX ORAL Take 1 tablet by mouth once daily.        vitamin D (VITAMIN D3) 1000 units Tab Take 1,000 Units by mouth once daily.             Review of Systems:  Constitutional: no fever or chills  Respiratory: no cough or shortness of breath  Cardiovascular: no chest pain or palpitations  Gastrointestinal: no nausea or vomiting, tolerating diet  Genitourinary: no hematuria or dysuria  Musculoskeletal: no arthralgias or myalgias  Neurological: no seizures or tremors  Behavioral/Psych: no auditory or visual hallucinations     OBJECTIVE:      Vital Signs (Most Recent):  BP: 106/78 (08/30/24 1432)     Physical Exam:  General: well developed, well nourished  Neck: thyroid not enlarged, symmetric, no tenderness/mass/nodules  Lungs:  clear to auscultation bilaterally and normal respiratory effort  Cardiovascular: Heart: regular rate and rhythm, S1, S2 normal, no murmur, click, rub or gallop. Chest Wall: no tenderness. Extremities: no cyanosis or edema, or clubbing. Pulses: 2+ and symmetric  not examined.  Abdomen/Rectal: Abdomen: soft, non-tender non-distented; bowel sounds normal; no masses,  no organomegaly. Rectal: not examined  Pelvic:  external genitalia normal, urethra without abnormality or discharge,  vagina normal without discharge, cervix normal in appearance, no cervical motion tenderness, uterus normal size, shape, and consistency, no adnexal masses or tenderness    Skin: Skin color, texture, turgor normal. No rashes or lesions  Musculoskeletal:no clubbing, cyanosis  Neurologic: Normal strength and tone. No focal numbness or weakness  Psych/Behavioral:  Alert and oriented, appropriate affect.     Laboratory:        Lab Results   Component Value Date     WBC 6.80 10/07/2022     HGB 13.0 10/07/2022     HCT 39.6 10/07/2022     MCV 94 10/07/2022      10/07/2022      CMP        Sodium   Date Value Ref Range Status   10/26/2021 138 136 - 145 mmol/L Final            Potassium   Date Value Ref Range Status   10/26/2021 4.1 3.5 - 5.1 mmol/L Final            Chloride   Date Value Ref Range Status   10/26/2021 108 95 - 110 mmol/L Final            CO2   Date Value Ref Range Status   10/26/2021 23 23 - 29 mmol/L Final            Glucose   Date Value Ref Range Status   10/26/2021 87 70 - 110 mg/dL Final            BUN   Date Value Ref Range Status   10/26/2021 4 (L) 6 - 20 mg/dL Final            Creatinine   Date Value Ref Range Status   10/26/2021 0.7 0.5 - 1.4 mg/dL Final            Calcium   Date Value Ref Range Status   10/26/2021 9.4 8.7 - 10.5 mg/dL Final            Anion Gap   Date Value Ref Range Status   10/26/2021 7 (L) 8 - 16 mmol/L Final            eGFR if    Date Value Ref Range Status   10/26/2021 >60.0 >60 mL/min/1.73 m^2 Final              eGFR if non    Date Value Ref Range Status   10/26/2021 >60.0 >60 mL/min/1.73 m^2 Final       Comment:       Calculation used to obtain the estimated glomerular filtration  rate (eGFR) is the CKD-EPI equation.             ASSESSMENT/PLAN:   44 y.o.  with oligomenorrhea, CPP/endometriosis, interested in definitive surgical management with hysterectomy but with concerning US findings for possible endometrial mass (called a  possible calcified fibroid on last year's TVUS) and inability to obtain sampling in the office.      Patient counseled on risks of surgery including but not limited to pain, bleeding, infection, damage to surrounding pelvic or abdominal structures such as bowel or bladder, damage to pelvic/abdominal nerves and vasculature, anesthesia complications, and death. Counseled on planned hysteroscopic nature including the risks of uterine perforation, formation of intrauterine synechiae, and fluid overload. Patient consents to a blood transfusion if one becomes medically necessary. Consents to be signed AM of surgery, but patient verbally consented and all questions answered to the patient's apparent satisfaction. To OR 9/20/24 for HSC D&C and other necessary interventions.      Rx for Cytotec sent to pharmacy to take evening before and AM of surgery.      CBC, T&S, UPT to be ordered at preadmit.     RTC 3 weeks for postop visit.        Yenny Dang MD  Department of Obstetrics & Gynecology  [facility]

## 2024-09-19 NOTE — TELEPHONE ENCOUNTER
Called pt in regards to sx tomorrow.     Pt states that she spoke with pre-admit via a phone call about an hour and a half ago.     Informed pt to arrive at 11:00 am for surgery on the 1st floor of the [facility]. Clear liquids and sports drinks such as Gatorade are okay. However, informed not to consume any food after 9:30pm tonight. Pt verbalized understanding.

## 2024-09-19 NOTE — PRE-PROCEDURE INSTRUCTIONS
Information to Prepare you for your Surgery    PRE-ADMIT TESTING -  [phone]    [address]  [facility]          Your surgery has been scheduled at [facility]. We are pleased to have the opportunity to serve you. For Further Information please call [phone].    On the day of surgery please report to the Information Desk on the 1st floor.    CONTACT YOUR PHYSICIAN'S OFFICE THE DAY PRIOR TO YOUR SURGERY TO OBTAIN YOUR ARRIVAL TIME.     The evening before surgery do not eat anything after 9 p.m. ( this includes hard candy, chewing gum and mints).  You may only have GATORADE, POWERADE AND WATER  from 9 p.m. until you leave your home.   DO NOT DRINK ANY LIQUIDS ON THE WAY TO THE HOSPITAL.      Why does your anesthesiologist allow you to drink Gatorade/Powerade before surgery?  Gatorade/Powerade helps to increase your comfort before surgery and to decrease your nausea after surgery. The carbohydrates in Gatorade/Powerade help reduce your body's stress response to surgery.  If you are a diabetic-drink only water prior to surgery.    Outpatient Surgery- May allow 2 adult (18 and older) Support Persons (1 being the designated ) for all surgical/procedural patients. A breastfeeding mother will be allowed her infant and 2 adult Support Persons. No one under the age of 18 will be allowed in the building. No swapping out of visitors in the [facility].      SPECIAL MEDICATION INSTRUCTIONS: TAKE medications checked off by the Anesthesiologist on your Medication List.    Angiogram Patients: Take medications as instructed by your physician, including aspirin.     Surgery Patients:    If you take ASPIRIN - Your PHYSICIAN/SURGEON will need to inform you IF/OR when you need to stop taking aspirin prior to your surgery.     The week prior to surgery do not ot take any medications containing IBUPROFEN or NSAIDS ( Advil, Motrin, Goodys, BC, Aleve, Naproxen etc)  If you are not sure if you should take a medicine please call your surgeon's office.  Ok to take Tylenol    Do Not Wear any make-up (especially eye make-up) to surgery. Please remove any false eyelashes or eyelash extensions. If you arrive the day of surgery with makeup/eyelashes on you will be required to remove prior to surgery. (There is a risk of corneal abrasions if eye makeup/eyelash extensions are not removed)      Leave all valuables at home.   Do Not wear any jewelry or watches, including any metal in body piercings. Jewelry must be removed prior to coming to the hospital.  There is a possibility that rings that are unable to be removed may be cut off if they are on the surgical extremity.    Please remove all hair extensions, wigs, clips and any other metal accessories/ ornaments from your hair.  These items may pose a flammable/fire risk in Surgery and must be removed.    Do not shave your surgical area at least 5 days prior to your surgery. The surgical prep will be performed at the hospital according to Infection Control regulations.    Contact Lens must be removed before surgery. Either do not wear the contact lens or bring a case and solution for storage.  Please bring a container for eyeglasses or dentures as required.  Bring any paperwork your physician has provided, such as consent forms,  history and physicals, doctor's orders, etc.   Bring comfortable clothes that are loose fitting to wear upon discharge. Take into consideration the type of surgery being performed.  Maintain your diet as advised per your physician the day prior to surgery.      Adequate rest the night before surgery is advised.   Park in the Parking lot behind the hospital or in the [facility] across the street from the parking lot. Parking is complimentary.  If you will be discharged the same day as your procedure, please arrange for a responsible adult to drive you home or to accompany you if traveling by taxi.    YOU WILL NOT BE PERMITTED TO DRIVE OR TO LEAVE THE HOSPITAL ALONE AFTER SURGERY.   If you are being discharged the same day, it is strongly recommended that you arrange for someone to remain with you for the first 24 hrs following your surgery.    The Surgeon will speak to your family/visitor after your surgery regarding the outcome of your surgery and post op care.  The Surgeon may speak to you after your surgery, but there is a possibility you may not remember the details.  Please check with your family members regarding the conversation with the Surgeon.    We strongly recommend whoever is bringing you home be present for discharge instructions.  This will ensure a thorough understanding for your post op home care.    If the patient has fever, cough, or signs/symptoms of Flu or Covid please do not come in for your surgery. Contact your surgeon and your primary care physician for further instructions.           Thank you for your cooperation.  The Staff of [facility].            Bathing Instructions with Hibiclens    Shower the evening before and morning of your procedure with Chlorhexidine (Hibiclens)  do not use Chlorhexidine on your face or genitals. Do not get in your eyes.  Wash your face with water and your regular face wash/soap  Use your regular shampoo  Apply Chlorhexidine (Hibiclens) directly on your skin or on a wet washcloth and wash gently. When showering: Move away from the shower stream when applying Chlorhexidine (Hibiclens) to avoid rinsing off too soon.  Rinse thoroughly with warm water  Do not dilute Chlorhexidine (Hibiclens)   Dry off as usual, do not use any deodorant, powder, body lotions, perfume, after shave or cologne.

## 2024-09-20 ENCOUNTER — HOSPITAL ENCOUNTER (OUTPATIENT)
Facility: OTHER | Age: 44
Discharge: HOME | End: 2024-09-20
Attending: OBSTETRICS & GYNECOLOGY | Admitting: OBSTETRICS & GYNECOLOGY
Payer: COMMERCIAL

## 2024-09-20 ENCOUNTER — ANESTHESIA (OUTPATIENT)
Dept: SURGERY | Facility: OTHER | Age: 44
End: 2024-09-20
Payer: COMMERCIAL

## 2024-09-20 DIAGNOSIS — N80.9 ENDOMETRIOSIS: ICD-10-CM

## 2024-09-20 DIAGNOSIS — Z98.890 STATUS POST HYSTEROSCOPY: Primary | ICD-10-CM

## 2024-09-20 DIAGNOSIS — Z01.818 PREOP TESTING: ICD-10-CM

## 2024-09-20 DIAGNOSIS — N92.6 IRREGULAR BLEEDING: ICD-10-CM

## 2024-09-20 LAB
ABO + RH BLD: NORMAL
ANION GAP SERPL CALC-SCNC: 7 MMOL/L (ref 8–16)
B-HCG UR QL: NEGATIVE
BASOPHILS # BLD AUTO: 0.05 K/UL (ref 0–0.2)
BASOPHILS NFR BLD: 0.8 % (ref 0–1.9)
BLD GP AB SCN CELLS X3 SERPL QL: NORMAL
BUN SERPL-MCNC: 6 MG/DL (ref 6–20)
CALCIUM SERPL-MCNC: 9 MG/DL (ref 8.7–10.5)
CHLORIDE SERPL-SCNC: 111 MMOL/L (ref 95–110)
CO2 SERPL-SCNC: 22 MMOL/L (ref 23–29)
CREAT SERPL-MCNC: 0.7 MG/DL (ref 0.5–1.4)
CTP QC/QA: YES
DIFFERENTIAL METHOD BLD: ABNORMAL
EOSINOPHIL # BLD AUTO: 0.2 K/UL (ref 0–0.5)
EOSINOPHIL NFR BLD: 2.4 % (ref 0–8)
ERYTHROCYTE [DISTWIDTH] IN BLOOD BY AUTOMATED COUNT: 13.7 % (ref 11.5–14.5)
EST. GFR  (NO RACE VARIABLE): >60 ML/MIN/1.73 M^2
GLUCOSE SERPL-MCNC: 89 MG/DL (ref 70–110)
HCT VFR BLD AUTO: 35.4 % (ref 37–48.5)
HGB BLD-MCNC: 11.6 G/DL (ref 12–16)
IMM GRANULOCYTES # BLD AUTO: 0.01 K/UL (ref 0–0.04)
IMM GRANULOCYTES NFR BLD AUTO: 0.2 % (ref 0–0.5)
LYMPHOCYTES # BLD AUTO: 2.1 K/UL (ref 1–4.8)
LYMPHOCYTES NFR BLD: 33.4 % (ref 18–48)
MCH RBC QN AUTO: 30.2 PG (ref 27–31)
MCHC RBC AUTO-ENTMCNC: 32.8 G/DL (ref 32–36)
MCV RBC AUTO: 92 FL (ref 82–98)
MONOCYTES # BLD AUTO: 0.5 K/UL (ref 0.3–1)
MONOCYTES NFR BLD: 7.4 % (ref 4–15)
NEUTROPHILS # BLD AUTO: 3.6 K/UL (ref 1.8–7.7)
NEUTROPHILS NFR BLD: 55.8 % (ref 38–73)
NRBC BLD-RTO: 0 /100 WBC
PLATELET # BLD AUTO: 216 K/UL (ref 150–450)
PMV BLD AUTO: 12 FL (ref 9.2–12.9)
POTASSIUM SERPL-SCNC: 4.4 MMOL/L (ref 3.5–5.1)
RBC # BLD AUTO: 3.84 M/UL (ref 4–5.4)
SODIUM SERPL-SCNC: 140 MMOL/L (ref 136–145)
SPECIMEN OUTDATE: NORMAL
WBC # BLD AUTO: 6.38 K/UL (ref 3.9–12.7)

## 2024-09-20 PROCEDURE — 80048 BASIC METABOLIC PNL TOTAL CA: CPT | Performed by: OBSTETRICS & GYNECOLOGY

## 2024-09-20 PROCEDURE — 88305 TISSUE EXAM BY PATHOLOGIST: CPT | Performed by: PATHOLOGY

## 2024-09-20 PROCEDURE — 86850 RBC ANTIBODY SCREEN: CPT | Performed by: OBSTETRICS & GYNECOLOGY

## 2024-09-20 PROCEDURE — 71000016 HC POSTOP RECOV ADDL HR: Performed by: OBSTETRICS & GYNECOLOGY

## 2024-09-20 PROCEDURE — 63600175 PHARM REV CODE 636 W HCPCS: Performed by: NURSE ANESTHETIST, CERTIFIED REGISTERED

## 2024-09-20 PROCEDURE — 81025 URINE PREGNANCY TEST: CPT | Performed by: ANESTHESIOLOGY

## 2024-09-20 PROCEDURE — 36415 COLL VENOUS BLD VENIPUNCTURE: CPT | Performed by: OBSTETRICS & GYNECOLOGY

## 2024-09-20 PROCEDURE — 86901 BLOOD TYPING SEROLOGIC RH(D): CPT | Performed by: OBSTETRICS & GYNECOLOGY

## 2024-09-20 PROCEDURE — 25000003 PHARM REV CODE 250: Performed by: NURSE ANESTHETIST, CERTIFIED REGISTERED

## 2024-09-20 PROCEDURE — 27201423 OPTIME MED/SURG SUP & DEVICES STERILE SUPPLY: Performed by: OBSTETRICS & GYNECOLOGY

## 2024-09-20 PROCEDURE — 25000003 PHARM REV CODE 250: Performed by: ANESTHESIOLOGY

## 2024-09-20 PROCEDURE — 63600175 PHARM REV CODE 636 W HCPCS: Performed by: ANESTHESIOLOGY

## 2024-09-20 PROCEDURE — 58558 HYSTEROSCOPY BIOPSY: CPT | Mod: ,,, | Performed by: OBSTETRICS & GYNECOLOGY

## 2024-09-20 PROCEDURE — 37000009 HC ANESTHESIA EA ADD 15 MINS: Performed by: OBSTETRICS & GYNECOLOGY

## 2024-09-20 PROCEDURE — 25000003 PHARM REV CODE 250: Performed by: OBSTETRICS & GYNECOLOGY

## 2024-09-20 PROCEDURE — C1782 MORCELLATOR: HCPCS | Performed by: OBSTETRICS & GYNECOLOGY

## 2024-09-20 PROCEDURE — 37000008 HC ANESTHESIA 1ST 15 MINUTES: Performed by: OBSTETRICS & GYNECOLOGY

## 2024-09-20 PROCEDURE — 0UDB8ZX EXTRACTION OF ENDOMETRIUM, VIA NATURAL OR ARTIFICIAL OPENING ENDOSCOPIC, DIAGNOSTIC: ICD-10-PCS | Performed by: OBSTETRICS & GYNECOLOGY

## 2024-09-20 PROCEDURE — 88305 TISSUE EXAM BY PATHOLOGIST: CPT | Mod: 26,,, | Performed by: PATHOLOGY

## 2024-09-20 PROCEDURE — 85025 COMPLETE CBC W/AUTO DIFF WBC: CPT | Performed by: OBSTETRICS & GYNECOLOGY

## 2024-09-20 PROCEDURE — 71000015 HC POSTOP RECOV 1ST HR: Performed by: OBSTETRICS & GYNECOLOGY

## 2024-09-20 PROCEDURE — 86900 BLOOD TYPING SEROLOGIC ABO: CPT | Performed by: OBSTETRICS & GYNECOLOGY

## 2024-09-20 PROCEDURE — 71000033 HC RECOVERY, INTIAL HOUR: Performed by: OBSTETRICS & GYNECOLOGY

## 2024-09-20 PROCEDURE — 36000707: Performed by: OBSTETRICS & GYNECOLOGY

## 2024-09-20 PROCEDURE — 36000706: Performed by: OBSTETRICS & GYNECOLOGY

## 2024-09-20 RX ORDER — DIPHENHYDRAMINE HYDROCHLORIDE 50 MG/ML
12.5 INJECTION INTRAMUSCULAR; INTRAVENOUS EVERY 30 MIN PRN
Status: DISCONTINUED | OUTPATIENT
Start: 2024-09-20 | End: 2024-09-20 | Stop reason: HOSPADM

## 2024-09-20 RX ORDER — AMOXICILLIN 250 MG
1 CAPSULE ORAL DAILY
Qty: 14 TABLET | Refills: 0 | Status: ON HOLD | OUTPATIENT
Start: 2024-09-20 | End: 2024-09-25 | Stop reason: HOSPADM

## 2024-09-20 RX ORDER — SILVER NITRATE 38.21; 12.74 MG/1; MG/1
STICK TOPICAL
Status: DISCONTINUED | OUTPATIENT
Start: 2024-09-20 | End: 2024-09-20 | Stop reason: HOSPADM

## 2024-09-20 RX ORDER — DIPHENHYDRAMINE HYDROCHLORIDE 50 MG/ML
INJECTION INTRAMUSCULAR; INTRAVENOUS
Status: DISCONTINUED | OUTPATIENT
Start: 2024-09-20 | End: 2024-09-20

## 2024-09-20 RX ORDER — HYDROCODONE BITARTRATE AND ACETAMINOPHEN 5; 325 MG/1; MG/1
1 TABLET ORAL EVERY 4 HOURS PRN
Qty: 5 TABLET | Refills: 0 | Status: ON HOLD | OUTPATIENT
Start: 2024-09-20 | End: 2024-09-26 | Stop reason: HOSPADM

## 2024-09-20 RX ORDER — PREGABALIN 75 MG/1
75 CAPSULE ORAL ONCE
Status: COMPLETED | OUTPATIENT
Start: 2024-09-20 | End: 2024-09-20

## 2024-09-20 RX ORDER — LIDOCAINE HYDROCHLORIDE 20 MG/ML
INJECTION INTRAVENOUS
Status: DISCONTINUED | OUTPATIENT
Start: 2024-09-20 | End: 2024-09-20

## 2024-09-20 RX ORDER — DEXTROMETHORPHAN HYDROBROMIDE, GUAIFENESIN 5; 100 MG/5ML; MG/5ML
650 LIQUID ORAL EVERY 6 HOURS PRN
Qty: 30 TABLET | Refills: 0 | Status: SHIPPED | OUTPATIENT
Start: 2024-09-20

## 2024-09-20 RX ORDER — SODIUM CHLORIDE, SODIUM LACTATE, POTASSIUM CHLORIDE, CALCIUM CHLORIDE 600; 310; 30; 20 MG/100ML; MG/100ML; MG/100ML; MG/100ML
INJECTION, SOLUTION INTRAVENOUS CONTINUOUS
Status: DISCONTINUED | OUTPATIENT
Start: 2024-09-20 | End: 2024-09-20 | Stop reason: HOSPADM

## 2024-09-20 RX ORDER — DEXAMETHASONE SODIUM PHOSPHATE 4 MG/ML
INJECTION, SOLUTION INTRA-ARTICULAR; INTRALESIONAL; INTRAMUSCULAR; INTRAVENOUS; SOFT TISSUE
Status: DISCONTINUED | OUTPATIENT
Start: 2024-09-20 | End: 2024-09-20

## 2024-09-20 RX ORDER — MIDAZOLAM HYDROCHLORIDE 1 MG/ML
INJECTION INTRAMUSCULAR; INTRAVENOUS
Status: DISCONTINUED | OUTPATIENT
Start: 2024-09-20 | End: 2024-09-20

## 2024-09-20 RX ORDER — LIDOCAINE HYDROCHLORIDE 10 MG/ML
0.5 INJECTION, SOLUTION EPIDURAL; INFILTRATION; INTRACAUDAL; PERINEURAL ONCE
Status: DISCONTINUED | OUTPATIENT
Start: 2024-09-20 | End: 2024-09-20 | Stop reason: HOSPADM

## 2024-09-20 RX ORDER — ONDANSETRON HYDROCHLORIDE 2 MG/ML
INJECTION, SOLUTION INTRAVENOUS
Status: DISCONTINUED | OUTPATIENT
Start: 2024-09-20 | End: 2024-09-20

## 2024-09-20 RX ORDER — PSEUDOEPHEDRINE HCL 30 MG
30 TABLET ORAL EVERY 4 HOURS PRN
Status: ON HOLD | COMMUNITY
End: 2024-09-25 | Stop reason: HOSPADM

## 2024-09-20 RX ORDER — PROCHLORPERAZINE EDISYLATE 5 MG/ML
5 INJECTION INTRAMUSCULAR; INTRAVENOUS EVERY 30 MIN PRN
Status: DISCONTINUED | OUTPATIENT
Start: 2024-09-20 | End: 2024-09-20 | Stop reason: HOSPADM

## 2024-09-20 RX ORDER — HYDROMORPHONE HYDROCHLORIDE 2 MG/ML
0.4 INJECTION, SOLUTION INTRAMUSCULAR; INTRAVENOUS; SUBCUTANEOUS EVERY 5 MIN PRN
Status: DISCONTINUED | OUTPATIENT
Start: 2024-09-20 | End: 2024-09-20 | Stop reason: HOSPADM

## 2024-09-20 RX ORDER — SODIUM CHLORIDE 0.9 % (FLUSH) 0.9 %
3 SYRINGE (ML) INJECTION
Status: DISCONTINUED | OUTPATIENT
Start: 2024-09-20 | End: 2024-09-20 | Stop reason: HOSPADM

## 2024-09-20 RX ORDER — FENTANYL CITRATE 50 UG/ML
INJECTION, SOLUTION INTRAMUSCULAR; INTRAVENOUS
Status: DISCONTINUED | OUTPATIENT
Start: 2024-09-20 | End: 2024-09-20

## 2024-09-20 RX ORDER — PROPOFOL 10 MG/ML
VIAL (ML) INTRAVENOUS
Status: DISCONTINUED | OUTPATIENT
Start: 2024-09-20 | End: 2024-09-20

## 2024-09-20 RX ORDER — IBUPROFEN 600 MG/1
600 TABLET ORAL EVERY 6 HOURS PRN
Qty: 30 TABLET | Refills: 0 | Status: ON HOLD | OUTPATIENT
Start: 2024-09-20 | End: 2024-09-26 | Stop reason: HOSPADM

## 2024-09-20 RX ORDER — MEPERIDINE HYDROCHLORIDE 25 MG/ML
12.5 INJECTION INTRAMUSCULAR; INTRAVENOUS; SUBCUTANEOUS ONCE AS NEEDED
Status: DISCONTINUED | OUTPATIENT
Start: 2024-09-20 | End: 2024-09-20 | Stop reason: HOSPADM

## 2024-09-20 RX ORDER — GLUCAGON 1 MG
1 KIT INJECTION
Status: DISCONTINUED | OUTPATIENT
Start: 2024-09-20 | End: 2024-09-20 | Stop reason: HOSPADM

## 2024-09-20 RX ORDER — ACETAMINOPHEN 500 MG
1000 TABLET ORAL
Status: COMPLETED | OUTPATIENT
Start: 2024-09-20 | End: 2024-09-20

## 2024-09-20 RX ORDER — HYDROCODONE BITARTRATE AND ACETAMINOPHEN 5; 325 MG/1; MG/1
1 TABLET ORAL ONCE
Status: COMPLETED | OUTPATIENT
Start: 2024-09-20 | End: 2024-09-20

## 2024-09-20 RX ORDER — FAMOTIDINE 20 MG/1
20 TABLET, FILM COATED ORAL
Status: COMPLETED | OUTPATIENT
Start: 2024-09-20 | End: 2024-09-20

## 2024-09-20 RX ORDER — EPHEDRINE SULFATE 50 MG/ML
INJECTION, SOLUTION INTRAVENOUS
Status: DISCONTINUED | OUTPATIENT
Start: 2024-09-20 | End: 2024-09-20

## 2024-09-20 RX ADMIN — HYDROCODONE BITARTRATE AND ACETAMINOPHEN 1 TABLET: 5; 325 TABLET ORAL at 02:09

## 2024-09-20 RX ADMIN — PROPOFOL 200 MG: 10 INJECTION, EMULSION INTRAVENOUS at 12:09

## 2024-09-20 RX ADMIN — MIDAZOLAM HYDROCHLORIDE 2 MG: 1 INJECTION INTRAMUSCULAR; INTRAVENOUS at 12:09

## 2024-09-20 RX ADMIN — SODIUM CHLORIDE, SODIUM LACTATE, POTASSIUM CHLORIDE, AND CALCIUM CHLORIDE: 600; 310; 30; 20 INJECTION, SOLUTION INTRAVENOUS at 11:09

## 2024-09-20 RX ADMIN — DEXAMETHASONE SODIUM PHOSPHATE 4 MG: 4 INJECTION, SOLUTION INTRAMUSCULAR; INTRAVENOUS at 12:09

## 2024-09-20 RX ADMIN — FENTANYL CITRATE 100 MCG: 50 INJECTION, SOLUTION INTRAMUSCULAR; INTRAVENOUS at 12:09

## 2024-09-20 RX ADMIN — FENTANYL CITRATE 25 MCG: 50 INJECTION, SOLUTION INTRAMUSCULAR; INTRAVENOUS at 01:09

## 2024-09-20 RX ADMIN — ACETAMINOPHEN 1000 MG: 500 TABLET ORAL at 11:09

## 2024-09-20 RX ADMIN — FAMOTIDINE 20 MG: 20 TABLET ORAL at 11:09

## 2024-09-20 RX ADMIN — ONDANSETRON HYDROCHLORIDE 4 MG: 2 INJECTION INTRAMUSCULAR; INTRAVENOUS at 12:09

## 2024-09-20 RX ADMIN — EPHEDRINE SULFATE 15 MG: 50 INJECTION INTRAVENOUS at 12:09

## 2024-09-20 RX ADMIN — PREGABALIN 75 MG: 75 CAPSULE ORAL at 11:09

## 2024-09-20 RX ADMIN — DIPHENHYDRAMINE HYDROCHLORIDE 25 MG: 50 INJECTION, SOLUTION INTRAMUSCULAR; INTRAVENOUS at 01:09

## 2024-09-20 RX ADMIN — LIDOCAINE HYDROCHLORIDE 100 MG: 20 INJECTION, SOLUTION INTRAVENOUS at 12:09

## 2024-09-20 NOTE — DISCHARGE SUMMARY
Discharge Summary  Gynecology      Admit Date: 2024    Discharge Date and Time: 2024     Attending Physician: Yenny Dang MD    Principal Diagnoses: Status post hysteroscopy    Active Hospital Problems    Diagnosis  POA    *s/p hysteroscopy D&C [Z98.890]  Not Applicable      Resolved Hospital Problems   No resolved problems to display.       Procedures: Procedure(s) (LRB):  HYSTEROSCOPY, WITH DILATION AND CURETTAGE OF UTERUS (N/A)    Discharged Condition: good    Hospital Course:   Wanda Singleton is a 44 y.o. y.o.  female who presented on 2024   for above procedures 2/2 oligomenorrhea, CPP/ednometriosis, and possible PEDRO/cervical mass. Patient tolerated procedure. Post-operative course was uncomplicated.  On day of discharge, patient was urinating, ambulating, and tolerating a regular diet without difficulty. Pain was well controlled on PO medication. She was discharged home on POD#0 in stable condition with instructions to follow up with Dr. Dang in 3 weeks.     Consults: None    Significant Diagnostic Studies:  Recent Labs   Lab 24  1122   WBC 6.38   HGB 11.6*   HCT 35.4*   MCV 92           Treatments:   1. Surgery as above    Disposition: Home or Self Care    Patient Instructions:   Current Discharge Medication List        START taking these medications    Details   acetaminophen (TYLENOL) 650 MG TbSR Take 1 tablet (650 mg total) by mouth every 6 (six) hours as needed.  Qty: 30 tablet, Refills: 0      HYDROcodone-acetaminophen (NORCO) 5-325 mg per tablet Take 1 tablet by mouth every 4 (four) hours as needed for Pain.  Qty: 5 tablet, Refills: 0    Comments: n/a       senna-docusate 8.6-50 mg (SENNA WITH DOCUSATE SODIUM) 8.6-50 mg per tablet Take 1 tablet by mouth once daily.  Qty: 14 tablet, Refills: 0           CONTINUE these medications which have CHANGED    Details   ibuprofen (ADVIL,MOTRIN) 600 MG tablet Take 1 tablet (600 mg total) by mouth every 6 (six)  hours as needed for Pain.  Qty: 30 tablet, Refills: 0           CONTINUE these medications which have NOT CHANGED    Details   albuterol (PROVENTIL/VENTOLIN HFA) 90 mcg/actuation inhaler Inhale 2 puffs into the lungs every 6 (six) hours as needed.      ascorbic acid, vitamin C, (VITAMIN C) 500 MG tablet Take 500 mg by mouth once daily.      diphenhydrAMINE (BENADRYL) 25 mg capsule Take 1 capsule by mouth every evening.      lysine (L-LYSINE) 500 mg Tab Take 500 mg by mouth once daily.      melatonin 5 mg Chew Take 1 tablet by mouth every evening.      multivitamin (THERAGRAN) per tablet Take 1 tablet by mouth once daily.      nystatin-triamcinolone (MYCOLOG II) cream Apply to affected area 2 times daily for 7 days  Qty: 30 g, Refills: 1    Associated Diagnoses: Yeast vaginitis      pseudoephedrine (SUDAFED) 30 MG tablet Take 30 mg by mouth every 4 (four) hours as needed for Congestion.      clotrimazole-betamethasone 1-0.05% (LOTRISONE) cream Apply topically 2 (two) times daily.  Qty: 15 g, Refills: 0    Associated Diagnoses: Yeast vaginitis           STOP taking these medications       miSOPROStoL (CYTOTEC) 200 MCG Tab Comments:   Reason for Stopping:         fluconazole (DIFLUCAN) 150 MG Tab Comments:   Reason for Stopping:         NURTEC 75 mg odt Comments:   Reason for Stopping:         VITAMIN B COMPLEX ORAL Comments:   Reason for Stopping:         vitamin D (VITAMIN D3) 1000 units Tab Comments:   Reason for Stopping:               Discharge Procedure Orders   Diet Adult Regular     Pelvic Rest   Order Comments: Nothing in the vagina including tampons and intercourse for 2 weeks.     Notify your health care provider if you experience any of the following:  temperature >100.4     Notify your health care provider if you experience any of the following:  persistent nausea and vomiting or diarrhea     Notify your health care provider if you experience any of the following:  severe uncontrolled pain     Notify your  health care provider if you experience any of the following:  redness, tenderness, or signs of infection (pain, swelling, redness, odor or green/yellow discharge around incision site)     Notify your health care provider if you experience any of the following:  severe persistent headache     Notify your health care provider if you experience any of the following:  persistent dizziness, light-headedness, or visual disturbances     Notify your health care provider if you experience any of the following:  increased confusion or weakness     Notify your health care provider if you experience any of the following:   Order Comments: BLEEDING PRECAUTIONS: Please report to the Emergency Room or contact your physician if you are saturating 1 thick overnight pad per hour for 2 consecutive hours.    CONSTIPATION REMEDIES: Patients are often constipated after surgery or with use of narcotic pain medicine. Remain adequately hydrated by consuming 8 standard water bottles daily. Take a stool softener (Colace or Sennakot) daily, or Mirilax if you prefer. If you have not had a bowel movement for 3 days after surgery, or are uncomfortable and unable to pass stool, please try one or all of the following measures:  1.  Milk of Magnesia - 30 cc by mouth every 12 hours   2.  Dulcolax suppository - one suppository per rectum every 4-6 hours   3.  Metamucil, Fibercon or other bulk former - use as directed on packaging  4.  Fleet enema     Activity as tolerated     Shower on day dressing removed (No bath)   Order Comments: You may shower on day of surgery, with assistance if needed. Use mild unscented soap. Avoid submerging in water (baths, pools, hot tubs) for 4 weeks.        Follow-up Information       Yenny Dang MD Follow up in 3 week(s).    Specialty: Obstetrics and Gynecology  Why: Post-op follow up  Contact information:  [address] [address]  [phone]

## 2024-09-20 NOTE — ANESTHESIA PROCEDURE NOTES
Intubation    Date/Time: 9/20/2024 12:44 PM    Performed by: Pablo Zhao CRNA  Authorized by: Pipe Mack MD    Intubation:     Induction:  Intravenous    Intubated:  Postinduction    Mask Ventilation:  Easy mask    Attempts:  1    Attempted By:  CRNA    Difficult Airway Encountered?: No      Airway Device:  Supraglottic airway/LMA    Airway Device Size:  4.0    Style/Cuff Inflation:  Uncuffed    Secured at:  The lips    Placement Verified By:  Capnometry    Complicating Factors:  None    Findings Post-Intubation:  BS equal bilateral

## 2024-09-20 NOTE — DISCHARGE INSTRUCTIONS

## 2024-09-20 NOTE — INTERVAL H&P NOTE
The patient has been examined and the H&P has been reviewed:    I concur with the findings and no changes have occurred since H&P was written.    Surgery risks, benefits and alternative options discussed and understood by patient/family.    Wanda Singleton is 44 y.o.  presenting for scheduled hysteroscopy D&C.    Temp:  [97.7 °F (36.5 °C)] 97.7 °F (36.5 °C)  Pulse:  [61] 61  Resp:  [18] 18  SpO2:  [100 %] 100 %  BP: (119)/(55) 119/55    General: NAD, alert, oriented, cooperative  HEENT: NCAT, EOM grossly intact  Lungs: Normal WOB  Heart: regular rate  Abdomen: soft, nondistended, nontender, no rebound or guarding    Consents in chart. All questions answered and concerns addressed. To OR for planned procedure.

## 2024-09-20 NOTE — TRANSFER OF CARE
"Anesthesia Transfer of Care Note    Patient: Wanda Singleton    Procedure(s) Performed: Procedure(s) (LRB):  HYSTEROSCOPY, WITH DILATION AND CURETTAGE OF UTERUS (N/A)    Patient location: PACU    Anesthesia Type: general    Transport from OR: Transported from OR on 2-3 L/min O2 by NC with adequate spontaneous ventilation    Post pain: adequate analgesia    Post assessment: no apparent anesthetic complications    Post vital signs: stable    Level of consciousness: awake    Nausea/Vomiting: no nausea/vomiting    Complications: none    Transfer of care protocol was followed      Last vitals: Visit Vitals  /80 (BP Location: Right arm, Patient Position: Lying)   Pulse 102   Temp 36.6 °C (97.8 °F) (Temporal)   Resp 18   Ht 5' 3" (1.6 m)   Wt 55.8 kg (123 lb)   LMP 06/15/2024 (Exact Date)   SpO2 100%   Breastfeeding No   BMI 21.79 kg/m²     "

## 2024-09-20 NOTE — ANESTHESIA PREPROCEDURE EVALUATION
09/20/2024  Wanda Singleton is a 44 y.o., female.      Pre-op Assessment    I have reviewed the Patient Summary Reports.     I have reviewed the Nursing Notes. I have reviewed the NPO Status.   I have reviewed the Medications.     Review of Systems  Anesthesia Hx:  No problems with previous Anesthesia             Denies Family Hx of Anesthesia complications.    Denies Personal Hx of Anesthesia complications.                    Social:  Smoker       Hematology/Oncology:                        --  Cancer in past history:       Other (see Oncology comments)          Oncology Comments: Skin CA     EENT/Dental:  chronic allergic rhinitis           Cardiovascular:  Cardiovascular Normal                                            Pulmonary:    Asthma mild                   Renal/:  Renal/ Normal                 Hepatic/GI:  Hepatic/GI Normal                 Musculoskeletal:  Musculoskeletal Normal                Neurological:      Headaches                                 Endocrine:  Endocrine Normal            Psych:   anxiety             Physical Exam  General: Well nourished and Cooperative    Airway:  Mallampati: II   Mouth Opening: Normal  TM Distance: Normal  Neck ROM: Extension Painful    Dental:  Intact    Anesthesia Plan  Type of Anesthesia, risks & benefits discussed:    Anesthesia Type: Gen Supraglottic Airway  Intra-op Monitoring Plan: Standard ASA Monitors  Post Op Pain Control Plan: multimodal analgesia  Induction:  IV  Airway Plan: Video  Informed Consent: Informed consent signed with the Patient and all parties understand the risks and agree with anesthesia plan.  All questions answered.   ASA Score: 2  Day of Surgery Review of History & Physical: H&P Update referred to the surgeon/provider.    Ready For Surgery From Anesthesia Perspective.   .

## 2024-09-20 NOTE — PLAN OF CARE
Wanda Henry Areli has met all discharge criteria from Phase II. Vital Signs are stable, ambulating  without difficulty.Pain is now under control and tolerable for the pt. Pain score 4 at this time.  Discharge instructions given, patient verbalized understanding. Discharged from facility via wheelchair in stable condition.

## 2024-09-20 NOTE — OP NOTE
Operative Report    Procedure: Dilation and Curettage, Hysteroscopy    Date of Surgery 09/20/2024    Surgeon: Yenny Dang MD    Assistant: None    Pre-Op Diagnosis:   Oligomenorrhea/abnormal uterine bleeding  Chronic pelvic pain/endometriosis  US with concerns for endometrial mass  Unable to obtain endometrial biopsy in the office    Post-op Diagnosis:    1-4. Same  5. Lower uterine segment fibroid vs polyp extending into the cervix, obstructing internal os    EBL: 10     IVF: See anesthesia report    Urine Output: 75 cc     Specimen:   Myosure curettage  Endometrial curettage    Findings:   Normal external female genitalia. Narrow vagina, no descensus of uterus with gentle traction on the tenaculum. Normal cervix with small atrophic external os. Only able to sound to 3 cm initially. 3 mm diagnostic scope inserted and polypoid mass vs fibroid visualized extending into the cervix, obstructing internal os. Operative scope inserted and Myosure reach used to remove tissue. Operative scope then able to be inserted into endometrial cavity. Normal bilateral ostia. Fluffy endometrium seen diffusely. Uterus sounded to 8 cm. Sharp curettage performed. Specimens obtained and sent separately to pathology.  Hemostasis obtained at the end of the procedure, fluid deficit 170 cc.    Procedure in Detail:  The patient was taken to the Operating Room where general anesthesia was obtained, the patient was placed in the lithotomy position, with her legs in Yellofin stirrups. The patient was then prepped and draped in the normal sterile fashion. Two right angle retractors were placed in the vagina, the cervix was visualized and a single-tooth tenaculum was placed on the anterior aspect of the cervix.  Sound unable to be passed beyond 3 cm. The diagnostic hysteroscope was then white balanced and the line cleared of air, it was then inserted into the cervical os and the aforementioned findings were noted. Hegar dilators were used to  dilate the external os to accommodate the operative hysteroscope, and the Myosure reach device was used to excise the aforementioned polyp vs mass. At this time the endometrial cavity was entered and the aforementioned findings were noted. Small area of polypoid tissue seen near the right cornua, sampled with Myosure. 3-4 cm posterior fibroid vs polypoid mass removed with Myosure. The hysteroscope was then removed and sharp currettage of the uterus was performed and specimens were sent separately to pathology for evaluation. The single tooth tenaculum was then removed and the cervix was made hemostatic with pressure and silver nitrate. Hemostasis was excellent.    Sponge, lap and needle counts were correct x 2.  The patient tolerated the procedure well and was taken to recovery in stable condition.      Yenny Dang MD  Department of Obstetrics & Gynecology  [facility]

## 2024-09-20 NOTE — ANESTHESIA POSTPROCEDURE EVALUATION
Anesthesia Post Evaluation    Patient: Wanda Singleton    Procedure(s) Performed: Procedure(s) (LRB):  HYSTEROSCOPY, WITH DILATION AND CURETTAGE OF UTERUS (N/A)    Final Anesthesia Type: general      Patient location during evaluation: PACU  Patient participation: Yes- Able to Participate  Level of consciousness: awake and alert  Post-procedure vital signs: reviewed and stable  Pain management: adequate  Airway patency: patent    PONV status at discharge: No PONV  Anesthetic complications: no      Cardiovascular status: blood pressure returned to baseline  Respiratory status: unassisted and spontaneous ventilation  Hydration status: euvolemic  Follow-up not needed.              Vitals Value Taken Time   /60 09/20/24 1447   Temp 36.7 °C (98 °F) 09/20/24 1417   Pulse 84 09/20/24 1447   Resp 16 09/20/24 1447   SpO2 100 % 09/20/24 1447         Event Time   Out of Recovery 14:14:36         Pain/Anthony Score: Pain Rating Prior to Med Admin: 6 (9/20/2024  2:47 PM)  Anthony Score: 10 (9/20/2024  2:47 PM)

## 2024-09-21 VITALS
DIASTOLIC BLOOD PRESSURE: 60 MMHG | TEMPERATURE: 98 F | OXYGEN SATURATION: 100 % | HEIGHT: 63 IN | BODY MASS INDEX: 21.79 KG/M2 | SYSTOLIC BLOOD PRESSURE: 113 MMHG | HEART RATE: 84 BPM | RESPIRATION RATE: 16 BRPM | WEIGHT: 123 LBS

## 2024-09-22 ENCOUNTER — HOSPITAL ENCOUNTER (INPATIENT)
Facility: OTHER | Age: 44
LOS: 4 days | Discharge: HOME | DRG: 872 | End: 2024-09-26
Attending: EMERGENCY MEDICINE | Admitting: STUDENT IN AN ORGANIZED HEALTH CARE EDUCATION/TRAINING PROGRAM
Payer: COMMERCIAL

## 2024-09-22 DIAGNOSIS — N70.93 TUBO-OVARIAN ABSCESS: ICD-10-CM

## 2024-09-22 DIAGNOSIS — N70.93 TOA (TUBO-OVARIAN ABSCESS): Primary | ICD-10-CM

## 2024-09-22 DIAGNOSIS — Z98.890 STATUS POST HYSTEROSCOPY: ICD-10-CM

## 2024-09-22 DIAGNOSIS — R50.9 FEVER: ICD-10-CM

## 2024-09-22 LAB
ABO + RH BLD: NORMAL
ALBUMIN SERPL BCP-MCNC: 3.7 G/DL (ref 3.5–5.2)
ALP SERPL-CCNC: 83 U/L (ref 55–135)
ALT SERPL W/O P-5'-P-CCNC: 58 U/L (ref 10–44)
ANION GAP SERPL CALC-SCNC: 8 MMOL/L (ref 8–16)
AST SERPL-CCNC: 68 U/L (ref 10–40)
B-HCG UR QL: NEGATIVE
BASOPHILS # BLD AUTO: 0.04 K/UL (ref 0–0.2)
BASOPHILS NFR BLD: 0.3 % (ref 0–1.9)
BILIRUB SERPL-MCNC: 0.7 MG/DL (ref 0.1–1)
BILIRUB UR QL STRIP: NEGATIVE
BLD GP AB SCN CELLS X3 SERPL QL: NORMAL
BUN SERPL-MCNC: 7 MG/DL (ref 6–20)
CALCIUM SERPL-MCNC: 8.6 MG/DL (ref 8.7–10.5)
CHLORIDE SERPL-SCNC: 106 MMOL/L (ref 95–110)
CLARITY UR: ABNORMAL
CO2 SERPL-SCNC: 23 MMOL/L (ref 23–29)
COLOR UR: YELLOW
CREAT SERPL-MCNC: 0.7 MG/DL (ref 0.5–1.4)
CTP QC/QA: YES
DIFFERENTIAL METHOD BLD: ABNORMAL
EOSINOPHIL # BLD AUTO: 0 K/UL (ref 0–0.5)
EOSINOPHIL NFR BLD: 0.3 % (ref 0–8)
ERYTHROCYTE [DISTWIDTH] IN BLOOD BY AUTOMATED COUNT: 13.9 % (ref 11.5–14.5)
EST. GFR  (NO RACE VARIABLE): >60 ML/MIN/1.73 M^2
GLUCOSE SERPL-MCNC: 92 MG/DL (ref 70–110)
GLUCOSE UR QL STRIP: NEGATIVE
HCT VFR BLD AUTO: 30 % (ref 37–48.5)
HCV AB SERPL QL IA: NEGATIVE
HGB BLD-MCNC: 9.8 G/DL (ref 12–16)
HGB UR QL STRIP: ABNORMAL
HIV 1+2 AB+HIV1 P24 AG SERPL QL IA: NEGATIVE
IMM GRANULOCYTES # BLD AUTO: 0.04 K/UL (ref 0–0.04)
IMM GRANULOCYTES NFR BLD AUTO: 0.3 % (ref 0–0.5)
KETONES UR QL STRIP: NEGATIVE
LACTATE SERPL-SCNC: 0.6 MMOL/L (ref 0.5–2.2)
LEUKOCYTE ESTERASE UR QL STRIP: ABNORMAL
LIPASE SERPL-CCNC: 3 U/L (ref 4–60)
LYMPHOCYTES # BLD AUTO: 1.6 K/UL (ref 1–4.8)
LYMPHOCYTES NFR BLD: 11.9 % (ref 18–48)
MCH RBC QN AUTO: 30.2 PG (ref 27–31)
MCHC RBC AUTO-ENTMCNC: 32.7 G/DL (ref 32–36)
MCV RBC AUTO: 93 FL (ref 82–98)
MICROSCOPIC COMMENT: NORMAL
MONOCYTES # BLD AUTO: 1 K/UL (ref 0.3–1)
MONOCYTES NFR BLD: 6.9 % (ref 4–15)
NEUTROPHILS # BLD AUTO: 11 K/UL (ref 1.8–7.7)
NEUTROPHILS NFR BLD: 80.3 % (ref 38–73)
NITRITE UR QL STRIP: NEGATIVE
NRBC BLD-RTO: 0 /100 WBC
PH UR STRIP: 6 [PH] (ref 5–8)
PLATELET # BLD AUTO: 154 K/UL (ref 150–450)
PMV BLD AUTO: 12.8 FL (ref 9.2–12.9)
POTASSIUM SERPL-SCNC: 4.1 MMOL/L (ref 3.5–5.1)
PROT SERPL-MCNC: 6.7 G/DL (ref 6–8.4)
PROT UR QL STRIP: ABNORMAL
RBC # BLD AUTO: 3.24 M/UL (ref 4–5.4)
RBC #/AREA URNS HPF: 4 /HPF (ref 0–4)
SODIUM SERPL-SCNC: 137 MMOL/L (ref 136–145)
SP GR UR STRIP: 1.01 (ref 1–1.03)
SQUAMOUS #/AREA URNS HPF: 3 /HPF
URN SPEC COLLECT METH UR: ABNORMAL
UROBILINOGEN UR STRIP-ACNC: NEGATIVE EU/DL
WBC # BLD AUTO: 13.73 K/UL (ref 3.9–12.7)
WBC #/AREA URNS HPF: 4 /HPF (ref 0–5)

## 2024-09-22 PROCEDURE — 86850 RBC ANTIBODY SCREEN: CPT | Performed by: PHYSICIAN ASSISTANT

## 2024-09-22 PROCEDURE — 81000 URINALYSIS NONAUTO W/SCOPE: CPT | Performed by: PHYSICIAN ASSISTANT

## 2024-09-22 PROCEDURE — 36415 COLL VENOUS BLD VENIPUNCTURE: CPT | Performed by: PHYSICIAN ASSISTANT

## 2024-09-22 PROCEDURE — 36415 COLL VENOUS BLD VENIPUNCTURE: CPT

## 2024-09-22 PROCEDURE — 96375 TX/PRO/DX INJ NEW DRUG ADDON: CPT

## 2024-09-22 PROCEDURE — 96361 HYDRATE IV INFUSION ADD-ON: CPT

## 2024-09-22 PROCEDURE — 86803 HEPATITIS C AB TEST: CPT | Performed by: EMERGENCY MEDICINE

## 2024-09-22 PROCEDURE — 83605 ASSAY OF LACTIC ACID: CPT | Performed by: EMERGENCY MEDICINE

## 2024-09-22 PROCEDURE — 63600175 PHARM REV CODE 636 W HCPCS: Performed by: PHYSICIAN ASSISTANT

## 2024-09-22 PROCEDURE — 87389 HIV-1 AG W/HIV-1&-2 AB AG IA: CPT | Performed by: EMERGENCY MEDICINE

## 2024-09-22 PROCEDURE — 80053 COMPREHEN METABOLIC PANEL: CPT | Performed by: PHYSICIAN ASSISTANT

## 2024-09-22 PROCEDURE — 25000003 PHARM REV CODE 250

## 2024-09-22 PROCEDURE — 96374 THER/PROPH/DIAG INJ IV PUSH: CPT

## 2024-09-22 PROCEDURE — 81025 URINE PREGNANCY TEST: CPT | Performed by: PHYSICIAN ASSISTANT

## 2024-09-22 PROCEDURE — 25500020 PHARM REV CODE 255: Performed by: EMERGENCY MEDICINE

## 2024-09-22 PROCEDURE — 86901 BLOOD TYPING SEROLOGIC RH(D): CPT | Performed by: PHYSICIAN ASSISTANT

## 2024-09-22 PROCEDURE — 96376 TX/PRO/DX INJ SAME DRUG ADON: CPT

## 2024-09-22 PROCEDURE — 87040 BLOOD CULTURE FOR BACTERIA: CPT

## 2024-09-22 PROCEDURE — 83690 ASSAY OF LIPASE: CPT | Performed by: PHYSICIAN ASSISTANT

## 2024-09-22 PROCEDURE — 99223 1ST HOSP IP/OBS HIGH 75: CPT | Mod: ,,, | Performed by: STUDENT IN AN ORGANIZED HEALTH CARE EDUCATION/TRAINING PROGRAM

## 2024-09-22 PROCEDURE — 85025 COMPLETE CBC W/AUTO DIFF WBC: CPT

## 2024-09-22 PROCEDURE — 86900 BLOOD TYPING SEROLOGIC ABO: CPT | Performed by: PHYSICIAN ASSISTANT

## 2024-09-22 PROCEDURE — 25000003 PHARM REV CODE 250: Performed by: PHYSICIAN ASSISTANT

## 2024-09-22 PROCEDURE — 99285 EMERGENCY DEPT VISIT HI MDM: CPT | Mod: 25

## 2024-09-22 PROCEDURE — 11000001 HC ACUTE MED/SURG PRIVATE ROOM

## 2024-09-22 PROCEDURE — 63600175 PHARM REV CODE 636 W HCPCS: Mod: JZ,JG

## 2024-09-22 PROCEDURE — 63600175 PHARM REV CODE 636 W HCPCS

## 2024-09-22 RX ORDER — KETOROLAC TROMETHAMINE 30 MG/ML
10 INJECTION, SOLUTION INTRAMUSCULAR; INTRAVENOUS
Status: COMPLETED | OUTPATIENT
Start: 2024-09-22 | End: 2024-09-22

## 2024-09-22 RX ORDER — HYDROMORPHONE HYDROCHLORIDE 2 MG/ML
2 INJECTION, SOLUTION INTRAMUSCULAR; INTRAVENOUS; SUBCUTANEOUS EVERY 6 HOURS PRN
Status: DISCONTINUED | OUTPATIENT
Start: 2024-09-22 | End: 2024-09-22

## 2024-09-22 RX ORDER — SODIUM CHLORIDE, SODIUM LACTATE, POTASSIUM CHLORIDE, CALCIUM CHLORIDE 600; 310; 30; 20 MG/100ML; MG/100ML; MG/100ML; MG/100ML
INJECTION, SOLUTION INTRAVENOUS CONTINUOUS
Status: DISCONTINUED | OUTPATIENT
Start: 2024-09-22 | End: 2024-09-25

## 2024-09-22 RX ORDER — GABAPENTIN 300 MG/1
300 CAPSULE ORAL 3 TIMES DAILY
Status: DISCONTINUED | OUTPATIENT
Start: 2024-09-22 | End: 2024-09-26

## 2024-09-22 RX ORDER — SODIUM CHLORIDE 9 MG/ML
1000 INJECTION, SOLUTION INTRAVENOUS
Status: COMPLETED | OUTPATIENT
Start: 2024-09-22 | End: 2024-09-22

## 2024-09-22 RX ORDER — ONDANSETRON 8 MG/1
8 TABLET, ORALLY DISINTEGRATING ORAL EVERY 8 HOURS PRN
Status: DISCONTINUED | OUTPATIENT
Start: 2024-09-22 | End: 2024-09-25

## 2024-09-22 RX ORDER — KETOROLAC TROMETHAMINE 30 MG/ML
30 INJECTION, SOLUTION INTRAMUSCULAR; INTRAVENOUS EVERY 6 HOURS
Status: COMPLETED | OUTPATIENT
Start: 2024-09-23 | End: 2024-09-23

## 2024-09-22 RX ORDER — METRONIDAZOLE 500 MG/100ML
500 INJECTION, SOLUTION INTRAVENOUS
Status: DISCONTINUED | OUTPATIENT
Start: 2024-09-22 | End: 2024-09-24

## 2024-09-22 RX ORDER — SODIUM CHLORIDE 0.9 % (FLUSH) 0.9 %
10 SYRINGE (ML) INJECTION
Status: DISCONTINUED | OUTPATIENT
Start: 2024-09-22 | End: 2024-09-26 | Stop reason: HOSPADM

## 2024-09-22 RX ORDER — ACETAMINOPHEN 325 MG/1
650 TABLET ORAL EVERY 6 HOURS PRN
Status: DISCONTINUED | OUTPATIENT
Start: 2024-09-22 | End: 2024-09-23

## 2024-09-22 RX ORDER — IBUPROFEN 600 MG/1
600 TABLET ORAL EVERY 6 HOURS
Status: DISCONTINUED | OUTPATIENT
Start: 2024-09-24 | End: 2024-09-26 | Stop reason: HOSPADM

## 2024-09-22 RX ORDER — DIPHENHYDRAMINE HYDROCHLORIDE 50 MG/ML
12.5 INJECTION INTRAMUSCULAR; INTRAVENOUS
Status: COMPLETED | OUTPATIENT
Start: 2024-09-22 | End: 2024-09-22

## 2024-09-22 RX ORDER — HYDROMORPHONE HYDROCHLORIDE 1 MG/ML
1 INJECTION, SOLUTION INTRAMUSCULAR; INTRAVENOUS; SUBCUTANEOUS
Status: COMPLETED | OUTPATIENT
Start: 2024-09-22 | End: 2024-09-22

## 2024-09-22 RX ORDER — HYDROMORPHONE HYDROCHLORIDE 2 MG/ML
2 INJECTION, SOLUTION INTRAMUSCULAR; INTRAVENOUS; SUBCUTANEOUS EVERY 4 HOURS PRN
Status: DISCONTINUED | OUTPATIENT
Start: 2024-09-22 | End: 2024-09-23

## 2024-09-22 RX ORDER — HYDROMORPHONE HYDROCHLORIDE 2 MG/ML
2 INJECTION, SOLUTION INTRAMUSCULAR; INTRAVENOUS; SUBCUTANEOUS EVERY 4 HOURS PRN
Status: DISCONTINUED | OUTPATIENT
Start: 2024-09-22 | End: 2024-09-22

## 2024-09-22 RX ORDER — KETOROLAC TROMETHAMINE 30 MG/ML
15 INJECTION, SOLUTION INTRAMUSCULAR; INTRAVENOUS EVERY 6 HOURS
Status: DISCONTINUED | OUTPATIENT
Start: 2024-09-23 | End: 2024-09-22

## 2024-09-22 RX ADMIN — DIPHENHYDRAMINE HYDROCHLORIDE 12.5 MG: 50 INJECTION, SOLUTION INTRAMUSCULAR; INTRAVENOUS at 06:09

## 2024-09-22 RX ADMIN — HYDROMORPHONE HYDROCHLORIDE 2 MG: 2 INJECTION, SOLUTION INTRAMUSCULAR; INTRAVENOUS; SUBCUTANEOUS at 09:09

## 2024-09-22 RX ADMIN — CEFTRIAXONE SODIUM 1 G: 1 INJECTION, POWDER, FOR SOLUTION INTRAMUSCULAR; INTRAVENOUS at 06:09

## 2024-09-22 RX ADMIN — SODIUM CHLORIDE, POTASSIUM CHLORIDE, SODIUM LACTATE AND CALCIUM CHLORIDE: 600; 310; 30; 20 INJECTION, SOLUTION INTRAVENOUS at 07:09

## 2024-09-22 RX ADMIN — METRONIDAZOLE 500 MG: 5 INJECTION, SOLUTION INTRAVENOUS at 08:09

## 2024-09-22 RX ADMIN — GABAPENTIN 300 MG: 300 CAPSULE ORAL at 10:09

## 2024-09-22 RX ADMIN — HYDROMORPHONE HYDROCHLORIDE 2 MG: 2 INJECTION INTRAMUSCULAR; INTRAVENOUS; SUBCUTANEOUS at 04:09

## 2024-09-22 RX ADMIN — IOHEXOL 75 ML: 350 INJECTION, SOLUTION INTRAVENOUS at 04:09

## 2024-09-22 RX ADMIN — HYDROMORPHONE HYDROCHLORIDE 1 MG: 1 INJECTION, SOLUTION INTRAMUSCULAR; INTRAVENOUS; SUBCUTANEOUS at 02:09

## 2024-09-22 RX ADMIN — DOXYCYCLINE 100 MG: 100 INJECTION, POWDER, LYOPHILIZED, FOR SOLUTION INTRAVENOUS at 09:09

## 2024-09-22 RX ADMIN — KETOROLAC TROMETHAMINE 10 MG: 30 INJECTION, SOLUTION INTRAMUSCULAR; INTRAVENOUS at 03:09

## 2024-09-22 RX ADMIN — SODIUM CHLORIDE 1000 ML: 9 INJECTION, SOLUTION INTRAVENOUS at 02:09

## 2024-09-22 NOTE — ED PROVIDER NOTES
Source of History:  Patient, medical records and medical chart    Chief complaint:  Post-op Problem (Transfer from [facility] for OBGYN. )      HPI:  Wanda Singleton is a 44 y.o. female presenting from outside hospital with concern of postoperative infection.  Patient underwent hysteroscopy with biopsy, D&C on 09/20.  Patient does have a history of endometriosis and follows with OBGYN.  Patient states that she began with marked lower pelvic pain that radiates to the upper abdomen this morning around 5.  She was seen at outside ER where she was found to have low-grade temperature at 100.2°.  Initial labs from the site were concerning for elevated white blood cell count and ultrasound concerning for infectious process and she was transferred here for higher level of care in to be evaluated by OBGYN.  She denies any nausea or vomiting.  Does report some headache.      This is the extent to the patients complaints today here in the emergency department.    ROS: As per HPI    Review of patient's allergies indicates:   Allergen Reactions    Bee's [allergen ext-venom-honey bee] Anaphylaxis    Fluticasone Anxiety, Other (See Comments), Palpitations and Shortness Of Breath    Guaifenesin Anxiety, Hallucinations, Palpitations and Shortness Of Breath    Horseradish root extract Anaphylaxis, Hives, Nausea And Vomiting, Palpitations, Shortness Of Breath and Swelling    Adhesive Hives, Itching and Rash    Oxycodone Hives and Itching     Tolerates Dilaudid, morphine, hydrocodone.   Metabolizes pain meds quickly (redhead) does better with lower dose meds that can be taken more frequently.     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      Past Surgical History:   Procedure Laterality Date    ETHMOIDECTOMY  7/21/2022    Procedure: ETHMOIDECTOMY;  Surgeon: Delvis Galloway MD;  Location: [facility] OR [address] [place] [address];  Service: ENT;;    FRONTAL SINUS OBLITERATION  7/21/2022    Procedure: SINUSOTOMY, FRONTAL SINUS, OBLITERATIVE;  Surgeon: Delvis Galloway MD;  Location: [facility] OR [address] [place] [address];  Service: ENT;;    FUNCTIONAL ENDOSCOPIC SINUS SURGERY (FESS) USING COMPUTER-ASSISTED NAVIGATION Bilateral 7/21/2022    Procedure: FESS, USING COMPUTER-ASSISTED NAVIGATION;  Surgeon: Delvis Galloway MD;  Location: [facility] OR [address] [place] [address];  Service: ENT;  Laterality: Bilateral;    LAPAROSCOPY      x2 for endometriosis    LEG SURGERY      x8    MAXILLARY ANTROSTOMY  7/21/2022    Procedure: MAXILLARY ANTROSTOMY;  Surgeon: Delvis Galloway MD;  Location: [facility] OR [address] [place] [address];  Service: ENT;;       Social History     Tobacco Use    Smoking status: Every Day     Current packs/day: 0.50     Types: Cigarettes    Smokeless tobacco: Never   Substance Use Topics    Alcohol use: Not Currently    Drug use: Not Currently       Physical Exam:    BP (!) 93/54   Pulse 91   Temp 99.3 °F (37.4 °C) (Oral)   Resp 16   Ht 5' 3" (1.6 m)   Wt 63.8 kg (140 lb 10.5 oz)   LMP 09/14/2024 (Exact Date)   SpO2 97%   Breastfeeding No   BMI 24.92 kg/m²   Nursing note and vital signs reviewed.  Constitutional:  Appears pale.  Appears uncomfortable secondary to pain.   Eyes: No conjunctival injection.Extraocular muscles are intact.  ENT: Oropharynx clear.  Normal phonation.  Mucous membranes are dry  Cardiovascular: Regular rate and rhythm.  No murmurs. No gallops. No rubs  Respiratory: Clear to auscultation bilaterally.  Good air movement.  No wheezes.  No rhonchi. No rales. No accessory muscle use..  Abdomen: Soft.  Not distended.  Nontender.  No guarding.  No " rebound. Non-peritoneal.  Musculoskeletal: Good range of motion all joints.  No deformities.  Neck supple.  No meningismus.  Skin: No rashes seen.  Good turgor.  No abrasions.  No ecchymoses.  Neurologic: Motor intact.  Sensation intact.  No ataxia. No focal neurological deficits.  Psych: Appropriate, conversant    Labs that have been ordered have been independently reviewed and interpreted by myself.    I decided to obtain the patient's medical records.  Summary of Medical Records:  Reviewed outside records from [facility] with white count at 17.  Anemia noted with hemoglobin at 10.  Urine poor specimen however no overt signs of infection.  Will repeat here to follow up.        ED Course as of 09/22/24 2248   Sun Sep 22, 2024   1354 Evaluated patient EMS stretcher as she was a transfer.  Appears uncomfortable.  Continues to complain of upper and lower abdominal pain.  Did discuss with gyn.  Will add type and screen.  Will give analgesics and continue to monitor.  Reviewed out side chart with ultrasound and antibiotics administered [LC]      ED Course User Index  [LC] Delores Linda PA Jessica Lynn Alcumbrack 44 y.o. presented to the ED with c/o  complaint. Physical exam reveals ill and uncomfortable appearing female presents via transfer for further evaluation by gyn.  Abdomen is tenderness to palpation.  No guarding, rebound or rigidity.    Differential Diagnosis includes, but is not limited to:  STI, HSV, BV, PID, TOA, vaginal foreign body, vaginal trauma, ovarian torsion, ovarian cyst, UTI/pyelonephritis, pregnancy complication, dysmenorrhea, mittelschmertz, appendicitis, nephrolithiasis, appendicitis, colitis, diverticulitis,      ED management:  Reviewed previous labs and could not see blood cultures.  I do recognize antibiotics have been hung however given the postoperative state, elevated temperature and leukocytosis will continue with septic workup including blood cultures.  Initial  lactic was normal at outside hospital.  Repeat within normal limits.  Patient continues with leukocytosis.      Creatinine noted to be 0.71.  Gyn did evaluate the patient.  Recommending CT abdomen pelvis.  Initial lactic was negative however 2nd was sent which is negative as well.    Chest x-ray unremarkable.  CT abdomen reveals free fluid in the pelvis with enhancing bilateral adnexal tubular structures.  Differential of TOA, pyocele or hydro salpinx.  Additional findings noted.  Gyn recommended admission to their service for IV antibiotics and continued monitoring.  Discuss with patient she was agreeable to plan.      Results for orders placed or performed during the hospital encounter of 09/22/24   HIV 1/2 Ag/Ab (4th Gen)   Result Value Ref Range    HIV 1/2 Ag/Ab Negative Negative   Hepatitis C Antibody   Result Value Ref Range    Hepatitis C Ab Negative Negative   CBC auto differential   Result Value Ref Range    WBC 13.73 (H) 3.90 - 12.70 K/uL    RBC 3.24 (L) 4.00 - 5.40 M/uL    Hemoglobin 9.8 (L) 12.0 - 16.0 g/dL    Hematocrit 30.0 (L) 37.0 - 48.5 %    MCV 93 82 - 98 fL    MCH 30.2 27.0 - 31.0 pg    MCHC 32.7 32.0 - 36.0 g/dL    RDW 13.9 11.5 - 14.5 %    Platelets 154 150 - 450 K/uL    MPV 12.8 9.2 - 12.9 fL    Immature Granulocytes 0.3 0.0 - 0.5 %    Gran # (ANC) 11.0 (H) 1.8 - 7.7 K/uL    Immature Grans (Abs) 0.04 0.00 - 0.04 K/uL    Lymph # 1.6 1.0 - 4.8 K/uL    Mono # 1.0 0.3 - 1.0 K/uL    Eos # 0.0 0.0 - 0.5 K/uL    Baso # 0.04 0.00 - 0.20 K/uL    nRBC 0 0 /100 WBC    Gran % 80.3 (H) 38.0 - 73.0 %    Lymph % 11.9 (L) 18.0 - 48.0 %    Mono % 6.9 4.0 - 15.0 %    Eosinophil % 0.3 0.0 - 8.0 %    Basophil % 0.3 0.0 - 1.9 %    Differential Method Automated    Lactic acid, plasma   Result Value Ref Range    Lactate (Lactic Acid) 0.6 0.5 - 2.2 mmol/L   Urinalysis, Reflex to Urine Culture Urine, Clean Catch    Specimen: Urine   Result Value Ref Range    Specimen UA Urine, Clean Catch     Color, UA Yellow Yellow,  Straw, Funmilayo    Appearance, UA Hazy (A) Clear    pH, UA 6.0 5.0 - 8.0    Specific Gravity, UA 1.015 1.005 - 1.030    Protein, UA Trace (A) Negative    Glucose, UA Negative Negative    Ketones, UA Negative Negative    Bilirubin (UA) Negative Negative    Occult Blood UA 3+ (A) Negative    Nitrite, UA Negative Negative    Urobilinogen, UA Negative <2.0 EU/dL    Leukocytes, UA 2+ (A) Negative   Comprehensive metabolic panel   Result Value Ref Range    Sodium 137 136 - 145 mmol/L    Potassium 4.1 3.5 - 5.1 mmol/L    Chloride 106 95 - 110 mmol/L    CO2 23 23 - 29 mmol/L    Glucose 92 70 - 110 mg/dL    BUN 7 6 - 20 mg/dL    Creatinine 0.7 0.5 - 1.4 mg/dL    Calcium 8.6 (L) 8.7 - 10.5 mg/dL    Total Protein 6.7 6.0 - 8.4 g/dL    Albumin 3.7 3.5 - 5.2 g/dL    Total Bilirubin 0.7 0.1 - 1.0 mg/dL    Alkaline Phosphatase 83 55 - 135 U/L    AST 68 (H) 10 - 40 U/L    ALT 58 (H) 10 - 44 U/L    eGFR >60 >60 mL/min/1.73 m^2    Anion Gap 8 8 - 16 mmol/L   Lipase   Result Value Ref Range    Lipase 3 (L) 4 - 60 U/L   Urinalysis Microscopic   Result Value Ref Range    RBC, UA 4 0 - 4 /hpf    WBC, UA 4 0 - 5 /hpf    Squam Epithel, UA 3 /hpf    Microscopic Comment SEE COMMENT    POCT urine pregnancy   Result Value Ref Range    POC Preg Test, Ur Negative Negative     Acceptable Yes    Type And Screen Preop   Result Value Ref Range    Group & Rh A POS     Indirect Corby NEG      Imaging Results              X-Ray Chest AP Portable (Final result)  Result time 09/22/24 17:15:04      Final result by Deni Torrez MD (09/22/24 17:15:04)                   Impression:      No acute abnormality.      Electronically signed by: Deni Torrez  Date:    09/22/2024  Time:    17:15               Narrative:    EXAMINATION:  XR CHEST AP PORTABLE    CLINICAL HISTORY:  Fever, unspecified    TECHNIQUE:  Single frontal view of the chest was performed.    COMPARISON:  None    FINDINGS:  The lungs are clear, with normal appearance of  pulmonary vasculature and no pleural effusion or pneumothorax.    The cardiac silhouette is normal in size. The hilar and mediastinal contours are unremarkable.    Bones are intact.                                       CT Abdomen Pelvis With IV Contrast NO Oral Contrast (Final result)  Result time 09/22/24 17:04:21      Final result by Ash Sanchez MD (09/22/24 17:04:21)                   Impression:      Large volume free fluid in the pelvis with enhancing bilateral adnexal tubular structures concerning for tubo-ovarian abscess, pyocele, or hydrosalpinx.  Further evaluation pelvic ultrasound is recommended.  Clinical correlation is recommended.      Electronically signed by: Ash Sanchez MD  Date:    09/22/2024  Time:    17:04               Narrative:    EXAMINATION:  CT ABDOMEN PELVIS WITH IV CONTRAST    CLINICAL HISTORY:  Abdominal abscess/infection suspected;    TECHNIQUE:  Axial images of the abdomen and pelvis were acquired  after the use of 75 cc [id number] IV contrast.  Coronal and sagittal reconstructions were also obtained    COMPARISON:  None    FINDINGS:  Heart: Normal in size. No pericardial effusion.    Lungs: Atelectasis of the lower lobes bilaterally.    Liver: Normal in size and contour.  No focal hepatic lesion.    Gallbladder: No calcified gallstones.    Bile Ducts: No evidence of dilated ducts.    Pancreas: No mass or peripancreatic fat stranding.    Spleen: Unremarkable.    Stomach and duodenum: Unremarkable.    Adrenals: Unremarkable.    Kidneys/ Ureters: Normal in size and location. Normal enhancement. No hydronephrosis or nephrolithiasis. No ureteral dilatation.    Bladder: No evidence of wall thickening.    Reproductive organs: Uterus demonstrates heterogeneous enhancement.  Fluid within the endometrial canal.  1.2 cm enhancing focus in the myometrium likely representing a fibroid.  There is a significant amount of free fluid in the pelvis with bilateral tubular enhancing lesions  of the adnexal regions.  Findings are suspicious for tubo-ovarian abscesses or pyosalpinx.    Bowel/Mesentery: Small bowel is normal in caliber with no evidence of obstruction. No evidence of inflammation or wall thickening.  Normal appendix.  Colon demonstrates no focal wall thickening.  Moderate volume of stool throughout the colon.    Lymph nodes: No lymphadenapathy.    Vasculature: No aneurysm. No significant calcific atherosclerosis.    Abdominal wall:  Unremarkable.    Bones: No acute fracture. No suspicious osseous lesions.                                           Diagnostic Impression:    1. Fever    2. Tubo-ovarian abscess         ED Disposition Condition    Admit                   Delores Linda PA  09/22/24 [id number]

## 2024-09-22 NOTE — H&P
"H&P   Gynecology      SUBJECTIVE:     History of Present Illness:  Patient is a 44 y.o.  POD#2 hysteroscope DC (AUB) transferred from [place] for sepsis  TOA.      Patient reports she underwent hysteroscope DC on  was discharged same day. Per operative note, removal of endocervical polyp vs fibroid and then endometrium polypoid in appearance. Final path pending. Uncomplicated. She went home and took Tylenol, Ibuprofen and Norco for pain control. She denies any nausea or vomiting however reports decreased appetite. Normal urination. Has not had a BM in 1 week however this is her baseline. Has not passed gas since prior to surgery. Reports fever 101.2 at home. Reports sudden onset abdominal and back pain that woke her in her sleep  morning. Presented to [facility] ER. They transferred her to [facility] due to suspected sepsis (WBC 17, fever) secondary to suspected bilateral TOA seen on TVUS. She received IV gent and IV vanc.     On arrival to [facility], she received IV Dilaudid 1mg and IV Toradol 30mg with minimal improvement in pain. She reports h/o stage IV endometriosis diagnosed by laparoscopy. Desires a hysterectomy. She is frustrated with her pain and "wants everything out." She reports she metabolizes narcotics very quickly and has difficulty with pain control.     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        PSHx:   Past Surgical History:   Procedure Laterality Date    ETHMOIDECTOMY  2022    Procedure: ETHMOIDECTOMY;  Surgeon: Delvis Galloway MD;  Location: [facility] OR [address] [place] [address];  Service: ENT;;    FRONTAL SINUS OBLITERATION  2022    Procedure: SINUSOTOMY, FRONTAL SINUS, OBLITERATIVE;  " Surgeon: Delvis Galloway MD;  Location: [facility] OR [address] [place] [address];  Service: ENT;;    FUNCTIONAL ENDOSCOPIC SINUS SURGERY (FESS) USING COMPUTER-ASSISTED NAVIGATION Bilateral 7/21/2022    Procedure: FESS, USING COMPUTER-ASSISTED NAVIGATION;  Surgeon: Delvis Galloway MD;  Location: [facility] OR [address] [place] [address];  Service: ENT;  Laterality: Bilateral;    LAPAROSCOPY      x2 for endometriosis    LEG SURGERY      x8    MAXILLARY ANTROSTOMY  7/21/2022    Procedure: MAXILLARY ANTROSTOMY;  Surgeon: Delvis Galloway MD;  Location: [facility] OR [address] [place] [address];  Service: ENT;;       All:   Review of patient's allergies indicates:   Allergen Reactions    Bee's [allergen ext-venom-honey bee] Anaphylaxis    Fluticasone Anxiety, Other (See Comments), Palpitations and Shortness Of Breath    Guaifenesin Anxiety, Hallucinations, Palpitations and Shortness Of Breath    Horseradish root extract Anaphylaxis, Hives, Nausea And Vomiting, Palpitations, Shortness Of Breath and Swelling    Adhesive Hives, Itching and Rash    Oxycodone Hives and Itching     Tolerates Dilaudid, morphine, hydrocodone.   Metabolizes pain meds quickly (redhead) does better with lower dose meds that can be taken more frequently.     Bleach (sodium hypochlorite)     Levofloxacin     Latex, natural rubber Hives, Itching and Rash    Penicillins Hives and Nausea And Vomiting    Sulfa (sulfonamide antibiotics) Anxiety, Diarrhea, Hives, Itching, Nausea And Vomiting and Palpitations       Meds:   Medications Prior to Admission   Medication Sig Dispense Refill Last Dose    acetaminophen (TYLENOL) 650 MG TbSR Take 1 tablet (650 mg total) by mouth every 6 (six) hours as needed. 30 tablet 0     albuterol (PROVENTIL/VENTOLIN HFA) 90 mcg/actuation inhaler Inhale 2 puffs into the lungs every 6 (six) hours as needed.       ascorbic acid, vitamin C, (VITAMIN C) 500 MG tablet Take 500 mg by mouth once daily.       clotrimazole-betamethasone 1-0.05% (LOTRISONE) cream Apply topically 2 (two) times daily. 15 g 0      diphenhydrAMINE (BENADRYL) 25 mg capsule Take 1 capsule by mouth every evening.       HYDROcodone-acetaminophen (NORCO) 5-325 mg per tablet Take 1 tablet by mouth every 4 (four) hours as needed for Pain. 5 tablet 0     ibuprofen (ADVIL,MOTRIN) 600 MG tablet Take 1 tablet (600 mg total) by mouth every 6 (six) hours as needed for Pain. 30 tablet 0     lysine (L-LYSINE) 500 mg Tab Take 500 mg by mouth once daily.       melatonin 5 mg Chew Take 1 tablet by mouth every evening.       multivitamin (THERAGRAN) per tablet Take 1 tablet by mouth once daily.       nystatin-triamcinolone (MYCOLOG II) cream Apply to affected area 2 times daily for 7 days 30 g 1     pseudoephedrine (SUDAFED) 30 MG tablet Take 30 mg by mouth every 4 (four) hours as needed for Congestion.       senna-docusate 8.6-50 mg (SENNA WITH DOCUSATE SODIUM) 8.6-50 mg per tablet Take 1 tablet by mouth once daily. 14 tablet 0        SH:   Social History     Socioeconomic History    Marital status:    Tobacco Use    Smoking status: Every Day     Current packs/day: 0.50     Types: Cigarettes    Smokeless tobacco: Never   Substance and Sexual Activity    Alcohol use: Not Currently    Drug use: Not Currently    Sexual activity: Yes     Partners: Male     Birth control/protection: None     Social Determinants of Health     Financial Resource Strain: Low Risk  (1/30/2024)    Overall Financial Resource Strain (CARDIA)     Difficulty of Paying Living Expenses: Not hard at all   Food Insecurity: No Food Insecurity (1/30/2024)    Hunger Vital Sign     Worried About Running Out of Food in the Last Year: Never true     Ran Out of Food in the Last Year: Never true   Transportation Needs: No Transportation Needs (1/30/2024)    PRAPARE - Transportation     Lack of Transportation (Medical): No     Lack of Transportation (Non-Medical): No   Physical Activity: Sufficiently Active (1/30/2024)    Exercise Vital Sign     Days of Exercise per Week: 4 days     Minutes of  Exercise per Session: 60 min   Stress: Stress Concern Present (1/30/2024)    [facility] - Occupational Stress Questionnaire     Feeling of Stress : To some extent       FH:   Family History   Problem Relation Name Age of Onset    Breast cancer Neg Hx      Colon cancer Neg Hx      Ovarian cancer Neg Hx         Review of Systems:  Constitutional: no fever or chills  Respiratory: no cough or shortness of breath  Cardiovascular: no chest pain or palpitations  Gastrointestinal: Abdominal pain, decreased appetite, constipation. No nausea or vomiting, tolerating diet, negative for change in bowel habits  Genitourinary: No hematuria or dysuria, negative for urinary incontinence     OBJECTIVE:     Temp:  [98.8 °F (37.1 °C)-100.1 °F (37.8 °C)] 99.3 °F (37.4 °C)  Pulse:  [] 91  Resp:  [16-18] 16  SpO2:  [94 %-100 %] 97 %  BP: ()/(53-75) 93/54    Recent Labs   Lab 09/20/24  1122 09/22/24  1558   WBC 6.38 13.73*   HGB 11.6* 9.8*   HCT 35.4* 30.0*   MCV 92 93    154      Recent Labs   Lab 09/20/24  1122 09/22/24  1839    137   K 4.4 4.1   * 106   CO2 22* 23   BUN 6 7   CREATININE 0.7 0.7   GLU 89 92   PROT  --  6.7   BILITOT  --  0.7   ALKPHOS  --  83   ALT  --  58*   AST  --  68*            Physical Exam:  GEN: No apparent distress. Alert and oriented. Tearful.   CV: Regular rate and rhythm. Extremities warm and well perfused.   LUNGS: Breathing comfortably on RA, no increased WOB.   ABDOMEN: Mildly distended, tender in LLQ, voluntary guarding. No peritoneal signs and no rebound tenderness.  EXT: No erythema, no edema  : declined    Diagnostics:   Imaging Results              X-Ray Chest AP Portable (Final result)  Result time 09/22/24 17:15:04      Final result by Deni Torrez MD (09/22/24 17:15:04)                   Impression:      No acute abnormality.      Electronically signed by: Deni Torrez  Date:    09/22/2024  Time:    17:15               Narrative:     EXAMINATION:  XR CHEST AP PORTABLE    CLINICAL HISTORY:  Fever, unspecified    TECHNIQUE:  Single frontal view of the chest was performed.    COMPARISON:  None    FINDINGS:  The lungs are clear, with normal appearance of pulmonary vasculature and no pleural effusion or pneumothorax.    The cardiac silhouette is normal in size. The hilar and mediastinal contours are unremarkable.    Bones are intact.                                       CT Abdomen Pelvis With IV Contrast NO Oral Contrast (Final result)  Result time 09/22/24 17:04:21      Final result by Ash Sanchez MD (09/22/24 17:04:21)                   Impression:      Large volume free fluid in the pelvis with enhancing bilateral adnexal tubular structures concerning for tubo-ovarian abscess, pyocele, or hydrosalpinx.  Further evaluation pelvic ultrasound is recommended.  Clinical correlation is recommended.      Electronically signed by: Ash Sanchez MD  Date:    09/22/2024  Time:    17:04               Narrative:    EXAMINATION:  CT ABDOMEN PELVIS WITH IV CONTRAST    CLINICAL HISTORY:  Abdominal abscess/infection suspected;    TECHNIQUE:  Axial images of the abdomen and pelvis were acquired  after the use of 75 cc [id number] IV contrast.  Coronal and sagittal reconstructions were also obtained    COMPARISON:  None    FINDINGS:  Heart: Normal in size. No pericardial effusion.    Lungs: Atelectasis of the lower lobes bilaterally.    Liver: Normal in size and contour.  No focal hepatic lesion.    Gallbladder: No calcified gallstones.    Bile Ducts: No evidence of dilated ducts.    Pancreas: No mass or peripancreatic fat stranding.    Spleen: Unremarkable.    Stomach and duodenum: Unremarkable.    Adrenals: Unremarkable.    Kidneys/ Ureters: Normal in size and location. Normal enhancement. No hydronephrosis or nephrolithiasis. No ureteral dilatation.    Bladder: No evidence of wall thickening.    Reproductive organs: Uterus demonstrates heterogeneous  enhancement.  Fluid within the endometrial canal.  1.2 cm enhancing focus in the myometrium likely representing a fibroid.  There is a significant amount of free fluid in the pelvis with bilateral tubular enhancing lesions of the adnexal regions.  Findings are suspicious for tubo-ovarian abscesses or pyosalpinx.    Bowel/Mesentery: Small bowel is normal in caliber with no evidence of obstruction. No evidence of inflammation or wall thickening.  Normal appendix.  Colon demonstrates no focal wall thickening.  Moderate volume of stool throughout the colon.    Lymph nodes: No lymphadenapathy.    Vasculature: No aneurysm. No significant calcific atherosclerosis.    Abdominal wall:  Unremarkable.    Bones: No acute fracture. No suspicious osseous lesions.                                         ASSESSMENT/PLAN:     There are no hospital problems to display for this patient.      Wanda Singleton is a 44 y.o.  who presents with sepsis 2/2 bilateral TOA in setting of stage IV endometriosis. She remains hemodynamically stable without an acute abdomen. Admitting for IV abx and serial abdominal exams.     Sepsis 2/2 bilateral TOA   - On presentation to OSH; reported fever 101.2 at home however afebrile at OSH; WBC 17, lactate WNL. TVUS with concern for bilateral TOA however ovaries not well visualized. Received IV gent/vanc.   - On transfer to [facility], presented afebrile, normal HR and normotensive (MAPs 70s).   - Exam; abdomen tender most in LLQ. Patient declined a pelvic exam.   - Labs: WBC 13. Lactate, lipase WNL. Blood and urine cultures pending.   - CT AP as above; notable free fluid in abdomen and concern for bilateral TOAs.     Patient admitted for sepsis 2/2 suspected bilateral TOA possibly superimposed on stage IV endometriosis. Discussed we recommend conservative management with 24-48H of IV antibiotics. Discussed possibility of surgical management if signs of ruptured TOA. Imaging with free  "fluid however patient is HDS and abdomen remains non acute. In setting of known endometriosis patient is high risk for intra operative injury and conversion to laparotomy. Patient ultimately desires hysterectomy however she understands importance of "cooling off" with IV antibiotics.   - IV rocephin/doxy/flagyl   - IV toradol 30mg Q6H and IV dilaudid 1mg Q4H PRN   - Reg diet   - Trend CBC   - Serial abdominal exams   - Follow up urine and blood cultures     2. Post operative care; POD#2 hysteroscope DC (9/20)  - Operative findings below; performed by Dr Dang for AUB   - Final path pending   - Patient reports she did well immediately post op; no vaginal complaints. Declined pelvic exam so unable to fully assess.     Findings:   Normal external female genitalia. Narrow vagina, no descensus of uterus with gentle traction on the tenaculum. Normal cervix with small atrophic external os. Only able to sound to 3 cm initially. 3 mm diagnostic scope inserted and polypoid mass vs fibroid visualized extending into the cervix, obstructing internal os. Operative scope inserted and Myosure reach used to remove tissue. Operative scope then able to be inserted into endometrial cavity. Normal bilateral ostia. Fluffy endometrium seen diffusely. Uterus sounded to 8 cm. Sharp curettage performed. Specimens obtained and sent separately to pathology.  Hemostasis obtained at the end of the procedure, fluid deficit 170 cc.    Dr Dang notified of admission.     Sweetie Allen MD  PGY 4  Obstetrics and Gynecology   "

## 2024-09-23 ENCOUNTER — TELEPHONE (OUTPATIENT)
Dept: OBSTETRICS AND GYNECOLOGY | Facility: CLINIC | Age: 44
End: 2024-09-23
Payer: COMMERCIAL

## 2024-09-23 LAB
BASOPHILS # BLD AUTO: 0.02 K/UL (ref 0–0.2)
BASOPHILS NFR BLD: 0.3 % (ref 0–1.9)
DIFFERENTIAL METHOD BLD: ABNORMAL
EOSINOPHIL # BLD AUTO: 0.1 K/UL (ref 0–0.5)
EOSINOPHIL NFR BLD: 0.7 % (ref 0–8)
ERYTHROCYTE [DISTWIDTH] IN BLOOD BY AUTOMATED COUNT: 13.8 % (ref 11.5–14.5)
HCT VFR BLD AUTO: 30.8 % (ref 37–48.5)
HGB BLD-MCNC: 9.9 G/DL (ref 12–16)
IMM GRANULOCYTES # BLD AUTO: 0.01 K/UL (ref 0–0.04)
IMM GRANULOCYTES NFR BLD AUTO: 0.1 % (ref 0–0.5)
INR PPP: 1.1 (ref 0.8–1.2)
LYMPHOCYTES # BLD AUTO: 0.5 K/UL (ref 1–4.8)
LYMPHOCYTES NFR BLD: 6.5 % (ref 18–48)
MCH RBC QN AUTO: 30 PG (ref 27–31)
MCHC RBC AUTO-ENTMCNC: 32.1 G/DL (ref 32–36)
MCV RBC AUTO: 93 FL (ref 82–98)
MONOCYTES # BLD AUTO: 0.2 K/UL (ref 0.3–1)
MONOCYTES NFR BLD: 3.3 % (ref 4–15)
NEUTROPHILS # BLD AUTO: 6.1 K/UL (ref 1.8–7.7)
NEUTROPHILS NFR BLD: 89.1 % (ref 38–73)
NRBC BLD-RTO: 0 /100 WBC
PLATELET # BLD AUTO: 143 K/UL (ref 150–450)
PLATELET BLD QL SMEAR: ABNORMAL
PMV BLD AUTO: 12.5 FL (ref 9.2–12.9)
PROTHROMBIN TIME: 12 SEC (ref 9–12.5)
RBC # BLD AUTO: 3.3 M/UL (ref 4–5.4)
WBC # BLD AUTO: 6.89 K/UL (ref 3.9–12.7)

## 2024-09-23 PROCEDURE — 25000003 PHARM REV CODE 250

## 2024-09-23 PROCEDURE — 85025 COMPLETE CBC W/AUTO DIFF WBC: CPT

## 2024-09-23 PROCEDURE — 85610 PROTHROMBIN TIME: CPT

## 2024-09-23 PROCEDURE — 63600175 PHARM REV CODE 636 W HCPCS

## 2024-09-23 PROCEDURE — 25000003 PHARM REV CODE 250: Performed by: STUDENT IN AN ORGANIZED HEALTH CARE EDUCATION/TRAINING PROGRAM

## 2024-09-23 PROCEDURE — 36415 COLL VENOUS BLD VENIPUNCTURE: CPT

## 2024-09-23 PROCEDURE — 11000001 HC ACUTE MED/SURG PRIVATE ROOM

## 2024-09-23 PROCEDURE — 99233 SBSQ HOSP IP/OBS HIGH 50: CPT | Mod: ,,, | Performed by: OBSTETRICS & GYNECOLOGY

## 2024-09-23 RX ORDER — HYDROCODONE BITARTRATE AND ACETAMINOPHEN 10; 325 MG/1; MG/1
1 TABLET ORAL EVERY 4 HOURS PRN
Status: DISCONTINUED | OUTPATIENT
Start: 2024-09-23 | End: 2024-09-26 | Stop reason: HOSPADM

## 2024-09-23 RX ORDER — POLYETHYLENE GLYCOL 3350 17 G/17G
17 POWDER, FOR SOLUTION ORAL DAILY
Status: DISCONTINUED | OUTPATIENT
Start: 2024-09-24 | End: 2024-09-23

## 2024-09-23 RX ORDER — MUPIROCIN 20 MG/G
OINTMENT TOPICAL 2 TIMES DAILY
Status: DISCONTINUED | OUTPATIENT
Start: 2024-09-23 | End: 2024-09-26 | Stop reason: HOSPADM

## 2024-09-23 RX ORDER — LANOLIN ALCOHOL/MO/W.PET/CERES
400 CREAM (GRAM) TOPICAL NIGHTLY
Status: DISCONTINUED | OUTPATIENT
Start: 2024-09-23 | End: 2024-09-26 | Stop reason: HOSPADM

## 2024-09-23 RX ORDER — ACETAMINOPHEN 325 MG/1
650 TABLET ORAL EVERY 6 HOURS
Status: DISCONTINUED | OUTPATIENT
Start: 2024-09-23 | End: 2024-09-23

## 2024-09-23 RX ORDER — ONDANSETRON HYDROCHLORIDE 2 MG/ML
4 INJECTION, SOLUTION INTRAVENOUS ONCE
Status: COMPLETED | OUTPATIENT
Start: 2024-09-23 | End: 2024-09-23

## 2024-09-23 RX ORDER — HYDROCODONE BITARTRATE AND ACETAMINOPHEN 5; 325 MG/1; MG/1
1 TABLET ORAL EVERY 4 HOURS PRN
Status: DISCONTINUED | OUTPATIENT
Start: 2024-09-23 | End: 2024-09-26 | Stop reason: HOSPADM

## 2024-09-23 RX ORDER — BISACODYL 10 MG/1
10 SUPPOSITORY RECTAL ONCE
Status: COMPLETED | OUTPATIENT
Start: 2024-09-23 | End: 2024-09-23

## 2024-09-23 RX ORDER — DOCUSATE SODIUM 100 MG/1
100 CAPSULE, LIQUID FILLED ORAL 2 TIMES DAILY
Status: DISCONTINUED | OUTPATIENT
Start: 2024-09-23 | End: 2024-09-26 | Stop reason: HOSPADM

## 2024-09-23 RX ORDER — SIMETHICONE 80 MG
1 TABLET,CHEWABLE ORAL 3 TIMES DAILY
Status: DISCONTINUED | OUTPATIENT
Start: 2024-09-23 | End: 2024-09-26 | Stop reason: HOSPADM

## 2024-09-23 RX ORDER — HYDROMORPHONE HYDROCHLORIDE 2 MG/ML
2 INJECTION, SOLUTION INTRAMUSCULAR; INTRAVENOUS; SUBCUTANEOUS EVERY 4 HOURS PRN
Status: DISCONTINUED | OUTPATIENT
Start: 2024-09-23 | End: 2024-09-24

## 2024-09-23 RX ORDER — POLYETHYLENE GLYCOL 3350 17 G/17G
17 POWDER, FOR SOLUTION ORAL DAILY
Status: DISCONTINUED | OUTPATIENT
Start: 2024-09-23 | End: 2024-09-24

## 2024-09-23 RX ADMIN — METRONIDAZOLE 500 MG: 5 INJECTION, SOLUTION INTRAVENOUS at 11:09

## 2024-09-23 RX ADMIN — Medication 400 MG: at 08:09

## 2024-09-23 RX ADMIN — METRONIDAZOLE 500 MG: 5 INJECTION, SOLUTION INTRAVENOUS at 08:09

## 2024-09-23 RX ADMIN — SIMETHICONE 80 MG: 80 TABLET, CHEWABLE ORAL at 08:09

## 2024-09-23 RX ADMIN — GABAPENTIN 300 MG: 300 CAPSULE ORAL at 08:09

## 2024-09-23 RX ADMIN — MUPIROCIN: 20 OINTMENT TOPICAL at 09:09

## 2024-09-23 RX ADMIN — METRONIDAZOLE 500 MG: 5 INJECTION, SOLUTION INTRAVENOUS at 03:09

## 2024-09-23 RX ADMIN — ONDANSETRON 8 MG: 8 TABLET, ORALLY DISINTEGRATING ORAL at 02:09

## 2024-09-23 RX ADMIN — ACETAMINOPHEN 650 MG: 325 TABLET, FILM COATED ORAL at 11:09

## 2024-09-23 RX ADMIN — KETOROLAC TROMETHAMINE 30 MG: 30 INJECTION, SOLUTION INTRAMUSCULAR at 12:09

## 2024-09-23 RX ADMIN — MUPIROCIN: 20 OINTMENT TOPICAL at 10:09

## 2024-09-23 RX ADMIN — DOXYCYCLINE 100 MG: 100 INJECTION, POWDER, LYOPHILIZED, FOR SOLUTION INTRAVENOUS at 10:09

## 2024-09-23 RX ADMIN — HYDROMORPHONE HYDROCHLORIDE 2 MG: 2 INJECTION, SOLUTION INTRAMUSCULAR; INTRAVENOUS; SUBCUTANEOUS at 03:09

## 2024-09-23 RX ADMIN — HYDROMORPHONE HYDROCHLORIDE 2 MG: 2 INJECTION, SOLUTION INTRAMUSCULAR; INTRAVENOUS; SUBCUTANEOUS at 07:09

## 2024-09-23 RX ADMIN — BISACODYL 10 MG: 10 SUPPOSITORY RECTAL at 09:09

## 2024-09-23 RX ADMIN — HYDROMORPHONE HYDROCHLORIDE 2 MG: 2 INJECTION, SOLUTION INTRAMUSCULAR; INTRAVENOUS; SUBCUTANEOUS at 10:09

## 2024-09-23 RX ADMIN — CEFTRIAXONE SODIUM 1 G: 1 INJECTION, POWDER, FOR SOLUTION INTRAMUSCULAR; INTRAVENOUS at 05:09

## 2024-09-23 RX ADMIN — KETOROLAC TROMETHAMINE 30 MG: 30 INJECTION, SOLUTION INTRAMUSCULAR at 05:09

## 2024-09-23 RX ADMIN — ONDANSETRON 4 MG: 2 INJECTION INTRAMUSCULAR; INTRAVENOUS at 09:09

## 2024-09-23 RX ADMIN — DOXYCYCLINE 100 MG: 100 INJECTION, POWDER, LYOPHILIZED, FOR SOLUTION INTRAVENOUS at 08:09

## 2024-09-23 RX ADMIN — MAGNESIUM CITRATE: 1.75 LIQUID ORAL at 05:09

## 2024-09-23 RX ADMIN — DOCUSATE SODIUM 100 MG: 100 CAPSULE, LIQUID FILLED ORAL at 09:09

## 2024-09-23 RX ADMIN — GABAPENTIN 300 MG: 300 CAPSULE ORAL at 05:09

## 2024-09-23 RX ADMIN — SIMETHICONE 80 MG: 80 TABLET, CHEWABLE ORAL at 05:09

## 2024-09-23 RX ADMIN — KETOROLAC TROMETHAMINE 30 MG: 30 INJECTION, SOLUTION INTRAMUSCULAR at 11:09

## 2024-09-23 RX ADMIN — HYDROMORPHONE HYDROCHLORIDE 2 MG: 2 INJECTION, SOLUTION INTRAMUSCULAR; INTRAVENOUS; SUBCUTANEOUS at 12:09

## 2024-09-23 RX ADMIN — HYDROCODONE BITARTRATE AND ACETAMINOPHEN 1 TABLET: 10; 325 TABLET ORAL at 08:09

## 2024-09-23 RX ADMIN — DOCUSATE SODIUM 100 MG: 100 CAPSULE, LIQUID FILLED ORAL at 08:09

## 2024-09-23 NOTE — CONSULTS
See H&P. In short admitted for sepsis 2/2 bilateral TOA. Patient non acute and HDS. Treating conservatively with IV abx, serial abdominal exams, trending WBC.     Sweetie lAlen MD  PGY 4  Obstetrics and Gynecology

## 2024-09-23 NOTE — PLAN OF CARE
Problem: Adult Inpatient Plan of Care  Goal: Plan of Care Review  Outcome: Progressing  Goal: Patient-Specific Goal (Individualized)  Outcome: Progressing  Goal: Absence of Hospital-Acquired Illness or Injury  Outcome: Progressing  Goal: Optimal Comfort and Wellbeing  Outcome: Progressing  Goal: Readiness for Transition of Care  Outcome: Progressing     Problem: Wound  Goal: Optimal Coping  Outcome: Progressing  Goal: Optimal Functional Ability  Outcome: Progressing  Goal: Absence of Infection Signs and Symptoms  Outcome: Progressing  Goal: Improved Oral Intake  Outcome: Progressing  Goal: Optimal Pain Control and Function  Outcome: Progressing  Goal: Skin Health and Integrity  Outcome: Progressing  Goal: Optimal Wound Healing  Outcome: Progressing     POC reviewed with patient. Patient verbalized understanding. AAOX4. VSS. Pain managed per prn and scheduled medication orders. LR IV fluids infusing at 100 mL/hr. Call bell left within reach. Bed lowered and wheels locked. Patient free of falls and injury

## 2024-09-23 NOTE — CARE UPDATE
"MD to bedside for PM assessment    S: Resting in bed in no acute distress. Patient is attempting to eat some potatoes, able to tolerate a few bites. Reports no active nausea/vomiting. Complains of pain that remains stable. She does report an increase in abdominal distension and feelings of bloating. She had a small bowel movement following her suppository this morning but strongly desires relief from her constipation. She continues to endorse feelings of pressure in her pelvis.    O: /61   Pulse (!) 115   Temp 97.5 °F (36.4 °C)   Resp 13   Ht 5' 3" (1.6 m)   Wt 63.8 kg (140 lb 10.5 oz)   LMP 09/14/2024 (Exact Date)   SpO2 97%   Breastfeeding No   BMI 24.92 kg/m²     Physical Exam  NAD  Respirations even and unlabored  Mild bilateral pedal edema, stable from this AM  Upper abdomen soft, slightly more distended than morning assessment. Lower abdomen diffusely TTP. No rebound or guarding    A/P:  - Discussed constipation management options with patient including laxatives (Miralax, milk of magnesia, magnesium citrate), enemas. She is s/p Dulcolax suppository this morning with small BM. Additionally added on Colace BID and mag oxide QHS.  - Patient reports that she desires enema. Brown bomb enema ordered  - Pain regimen changed as follows:   - Norco 5/10 q4h   - IV Dilaudid for breakthrough pain   - acetaminophen discontinued   - will continue with one more dose of IV Toradol then transition to PO ibuprofen   - continue gabapentin TID   - offered lidocaine patch however I have low suspicion that this will provide patient relief  - Discussed with patient can change antiemetics if she wants to try something else, especially as Zofran can contribute to constipation. Patient reports she is not currently feeling nauseous or needing antiemetics.  - Reviewed TVUS from outside hospital (in media) which appears overall stable compared to pre-surgery TVUS  - Discussed likely etiology of pain being multifactorial " including fluid in uterus/pelvis following hysteroscopy, constipation, and endometriosis  - Will follow up after enema to re-evaluate patient's pain    Dione Silvestre MD  OB/GYN PGY-1

## 2024-09-23 NOTE — TELEPHONE ENCOUNTER
Called to check in on pt's post-op status. Pt is currently admitted at [facility]. I told pt to keep us updated, and that I would check in on her later this week. Pt verbalized understanding and appreciated the call.

## 2024-09-23 NOTE — PLAN OF CARE
LMSW met with the patient at the bedside. Patient is alert and oriented with no communication barriers. Prior to admission, the patient is independent. Patient denies the use of HH or DME. Patients PCP is correct on the face sheet. Patient choice pharmacy is bedside. Patient's family will transport the patient home at discharge.     [facility] ([address])  Initial Discharge Assessment       Primary Care Provider: Jas Gusman (Inactive)    Admission Diagnosis: Tubo-ovarian abscess [N70.93]  Fever [R50.9]    Admission Date: 9/22/2024  Expected Discharge Date: 9/25/2024    Transition of Care Barriers: (P) None    Payor: [facility] / Plan: [facility] CHOICE PLUS / Product Type: Commercial /     Extended Emergency Contact Information  Primary Emergency Contact: Tanner Singleton  Address: [address]           [place] [address] [facility]  Home Phone: [phone]  Relation: Spouse    Discharge Plan A: (P) Home with family, Home  Discharge Plan B: (P) Home      [facility]  [address] [facility] [address]  [facility] [address]  Phone: [phone] Fax: [phone]    [id number] - [place], [place] - [address]  [address]  [place] [address]  Phone: [phone] Fax: [phone]    [id number] - [place], [place] - [address]  [address] [facility] [place] [address]  Phone: [phone] Fax: [phone]    [facility]  [address]  [place] [address]  Phone: [phone] Fax: [phone]      Initial Assessment (most recent)       Adult Discharge Assessment - 09/23/24 1148          Discharge Assessment    Assessment Type Discharge Planning Assessment (P)      Confirmed/corrected address, phone number and insurance Yes (P)      Confirmed Demographics Correct on Facesheet (P)      Source of Information patient;health record (P)      People in Home spouse (P)      Do you expect to  return to your current living situation? Yes (P)      Do you have help at home or someone to help you manage your care at home? Yes (P)      Prior to hospitilization cognitive status: Alert/Oriented;No Deficits (P)      Current cognitive status: Alert/Oriented;No Deficits (P)      Equipment Currently Used at Home none (P)      Readmission within 30 days? No (P)      Patient currently being followed by outpatient case management? No (P)      Do you currently have service(s) that help you manage your care at home? No (P)      Do you take prescription medications? Yes (P)      Do you have prescription coverage? Yes (P)      Do you have any problems affording any of your prescribed medications? No (P)      Is the patient taking medications as prescribed? yes (P)      How do you get to doctors appointments? family or friend will provide;car, drives self (P)      Are you on dialysis? No (P)      Do you take coumadin? No (P)      Discharge Plan A Home with family;Home (P)      Discharge Plan B Home (P)      Discharge Plan discussed with: Patient (P)      Transition of Care Barriers None (P)

## 2024-09-23 NOTE — PROGRESS NOTES
"Formerly [facility] ([address]  Obstetrics & Gynecology  Progress Note    Patient Name: Wanda Singleton  MRN: [id number]  Admission Date: 2024  Primary Care Provider: Jas Gusman  Principal Problem: <principal problem not specified>    Subjective:     History of Present Illness:  Patient is a 44 y.o.  POD#2 hysteroscope DC (AUB) transferred from [place] for sepsis  TOA.       Patient reports she underwent hysteroscope DC on  was discharged same day. Per operative note, removal of endocervical polyp vs fibroid and then endometrium polypoid in appearance. Final path pending. Uncomplicated. She went home and took Tylenol, Ibuprofen and Norco for pain control. She denies any nausea or vomiting however reports decreased appetite. Normal urination. Has not had a BM in 1 week however this is her baseline. Has not passed gas since prior to surgery. Reports fever 101.2 at home. Reports sudden onset abdominal and back pain that woke her in her sleep  morning. Presented to [facility] ER. They transferred her to [facility] due to suspected sepsis (WBC 17, fever) secondary to suspected bilateral TOA seen on TVUS. She received IV gent and IV vanc.      On arrival to [facility], she received IV Dilaudid 1mg and IV Toradol 30mg with minimal improvement in pain. She reports h/o stage IV endometriosis diagnosed by laparoscopy. Desires a hysterectomy. She is frustrated with her pain and "wants everything out." She reports she metabolizes narcotics very quickly and has difficulty with pain control.      PMH: endometriosis stage IV, basal cell carcinoma (denies melanoma)  PSH: hys DC, laparoscopy x2      OB: denies     Interval History: NAEON. Patient reports issues with pain control overnight and feels as though her medications help her pain initially but then wear off rapidly. She has sudden onset of pain that lasts around 20 minutes which causes her to writhe around in the " bed. She reports an increase in abdominal bloating, last BM Thursday although she reports irregular BM is her baseline. She is ambulating and voiding without difficulty although she has a sensation of incomplete voiding associated with pelvic pressure. She has noticed an increase in swelling of her feet additionally since being hospitalized. She denies fever, chills, vomiting. She has not eaten since she has been in the hospital but reports an appetite and would like to eat soon but is having nausea.    Scheduled Meds:   cefTRIAXone (Rocephin) IV (PEDS and ADULTS)  1 g Intravenous Q24H    doxycycline IV (PEDS and ADULTS)  100 mg Intravenous Q12H    gabapentin  300 mg Oral TID    ketorolac  30 mg Intravenous Q6H    Followed by    [START ON 9/24/2024] ibuprofen  600 mg Oral Q6H    metroNIDAZOLE IV (PEDS and ADULTS)  500 mg Intravenous Q8H     Continuous Infusions:   lactated ringers   Intravenous Continuous 100 mL/hr at 09/22/24 1959 New Bag at 09/22/24 1959     PRN Meds:  Current Facility-Administered Medications:     acetaminophen, 650 mg, Oral, Q6H PRN    HYDROmorphone, 2 mg, Intravenous, Q4H PRN    ondansetron, 8 mg, Oral, Q8H PRN    sodium chloride 0.9%, 10 mL, Intravenous, PRN    Review of patient's allergies indicates:   Allergen Reactions    Bee's [allergen ext-venom-honey bee] Anaphylaxis    Fluticasone Anxiety, Other (See Comments), Palpitations and Shortness Of Breath    Guaifenesin Anxiety, Hallucinations, Palpitations and Shortness Of Breath    Horseradish root extract Anaphylaxis, Hives, Nausea And Vomiting, Palpitations, Shortness Of Breath and Swelling    Adhesive Hives, Itching and Rash    Oxycodone Hives and Itching     Tolerates Dilaudid, morphine, hydrocodone.   Metabolizes pain meds quickly (redhead) does better with lower dose meds that can be taken more frequently.     Bleach (sodium hypochlorite)     Levofloxacin     Latex, natural rubber Hives, Itching and Rash    Penicillins Hives and Nausea  And Vomiting    Sulfa (sulfonamide antibiotics) Anxiety, Diarrhea, Hives, Itching, Nausea And Vomiting and Palpitations       Objective:     Vital Signs (Most Recent):  Temp: 99.3 °F (37.4 °C) (09/23/24 0530)  Pulse: 104 (09/23/24 0534)  Resp: 12 (09/23/24 0530)  BP: (!) 112/58 (09/23/24 0530)  SpO2: (!) 94 % (09/23/24 0534) Vital Signs (24h Range):  Temp:  [98.2 °F (36.8 °C)-100.1 °F (37.8 °C)] 99.3 °F (37.4 °C)  Pulse:  [] 104  Resp:  [12-18] 12  SpO2:  [88 %-100 %] 94 %  BP: ()/(53-81) 112/58     Weight: 63.8 kg (140 lb 10.5 oz)  Body mass index is 24.92 kg/m².  Patient's last menstrual period was 09/14/2024 (exact date).    I&O (Last 24H):    Intake/Output Summary (Last 24 hours) at 9/23/2024 0616  Last data filed at 9/23/2024 0512  Gross per 24 hour   Intake 3020.66 ml   Output 600 ml   Net 2420.66 ml     Physical Exam:  GEN: Mild distress secondary to pain. Alert and oriented. Tearful.   CV: Regular rate and rhythm. Extremities warm and well perfused.   LUNGS: Breathing comfortably on RA, no increased WOB.   ABDOMEN: Mildly distended, tender in LLQ/suprapubic region, voluntary guarding. No peritoneal signs and no rebound tenderness.  EXT: No erythema, no edema  : declined on admission    Recent Labs   Lab 09/20/24  1122 09/22/24  1558 09/23/24  0521   WBC 6.38 13.73* 6.89   HGB 11.6* 9.8* 9.9*   HCT 35.4* 30.0* 30.8*   MCV 92 93 93    154 143*            Recent Labs   Lab 09/20/24  1122 09/22/24  1839    137   K 4.4 4.1   * 106   CO2 22* 23   BUN 6 7   CREATININE 0.7 0.7   GLU 89 92   PROT  --  6.7   BILITOT  --  0.7   ALKPHOS  --  83   ALT  --  58*   AST  --  68*       Imaging Results                  X-Ray Chest AP Portable (Final result)  Result time 09/22/24 17:15:04            Final result by Deni Torrez MD (09/22/24 17:15:04)                           Impression:        No acute abnormality.        Electronically signed by:Deni Torrez  Date:                                             09/22/2024  Time:                                            17:15                     Narrative:     EXAMINATION:  XR CHEST AP PORTABLE     CLINICAL HISTORY:  Fever, unspecified     TECHNIQUE:  Single frontal view of the chest was performed.     COMPARISON:  None     FINDINGS:  The lungs are clear, with normal appearance of pulmonary vasculature and no pleural effusion or pneumothorax.     The cardiac silhouette is normal in size. The hilar and mediastinal contours are unremarkable.     Bones are intact.                                                CT Abdomen Pelvis With IV Contrast NO Oral Contrast (Final result)  Result time 09/22/24 17:04:21            Final result by Ash Sanchez MD (09/22/24 17:04:21)                           Impression:        Large volume free fluid in the pelvis with enhancing bilateral adnexal tubular structures concerning for tubo-ovarian abscess, pyocele, or hydrosalpinx.  Further evaluation pelvic ultrasound is recommended.  Clinical correlation is recommended.        Electronically signed by:Ash Sanchez MD  Date:                                            09/22/2024  Time:                                            17:04                     Narrative:     EXAMINATION:  CT ABDOMEN PELVIS WITH IV CONTRAST     CLINICAL HISTORY:  Abdominal abscess/infection suspected;     TECHNIQUE:  Axial images of the abdomen and pelvis were acquired  after the use of 75 cc [id number] IV contrast.  Coronal and sagittal reconstructions were also obtained     COMPARISON:  None     FINDINGS:  Heart: Normal in size. No pericardial effusion.     Lungs: Atelectasis of the lower lobes bilaterally.     Liver: Normal in size and contour.  No focal hepatic lesion.     Gallbladder: No calcified gallstones.     Bile Ducts: No evidence of dilated ducts.     Pancreas: No mass or peripancreatic fat stranding.     Spleen: Unremarkable.     Stomach and duodenum: Unremarkable.      Adrenals: Unremarkable.     Kidneys/ Ureters: Normal in size and location. Normal enhancement. No hydronephrosis or nephrolithiasis. No ureteral dilatation.     Bladder: No evidence of wall thickening.     Reproductive organs: Uterus demonstrates heterogeneous enhancement.  Fluid within the endometrial canal.  1.2 cm enhancing focus in the myometrium likely representing a fibroid.  There is a significant amount of free fluid in the pelvis with bilateral tubular enhancing lesions of the adnexal regions.  Findings are suspicious for tubo-ovarian abscesses or pyosalpinx.     Bowel/Mesentery: Small bowel is normal in caliber with no evidence of obstruction. No evidence of inflammation or wall thickening.  Normal appendix.  Colon demonstrates no focal wall thickening.  Moderate volume of stool throughout the colon.     Lymph nodes: No lymphadenapathy.     Vasculature: No aneurysm. No significant calcific atherosclerosis.     Abdominal wall:  Unremarkable.     Bones: No acute fracture. No suspicious osseous lesions.                   Assessment/Plan:     Wanda Singleton is a 44 y.o.  who presents with sepsis 2/2 bilateral TOA in setting of stage IV endometriosis. She is POD3 hysteroscope D&C. She remains hemodynamically stable without an acute abdomen. Admitting for IV abx and serial abdominal exams.     Sepsis 2/2 bilateral TOA   - On presentation to OSH; reported fever 101.2 at home however afebrile at OSH; WBC 17, lactate WNL. TVUS with concern for bilateral TOA however ovaries not well visualized. Received IV gent/vanc.   - On transfer to [facility], presented afebrile, normal HR and normotensive (MAPs 70s).   - Exam; abdomen tender most in LLQ. Patient declined a pelvic exam.   - Labs: WBC 13. Lactate, lipase WNL. Blood and urine cultures pending.   - CT AP as above; notable free fluid in abdomen and concern for bilateral TOAs.  - On admission, discussed recommendation of conservative management  of 24-48h IV antibiotics and possible surgical management if signs of ruptured TOA. Imaging with free fluid in pelvis, however patient has remained hemodynamically stable without signs of acute abdomen. In setting of known endometriosis patient is high risk for intra operative injury and conversion to laparotomy. Patient ultimately desires hysterectomy however she understands importance of initial treatment with IV antibiotics.   - Continue IV rocephin/doxy/flagyl   - IV toradol 30mg Q6H and IV dilaudid 1mg Q4H PRN > plan to transition to PO pain medications today once able to tolerate PO  - acetaminophen changed from PRN to scheduled  - WBC downtrended today (13 > 6)  - Reg diet   - Serial abdominal exams   - Follow up urine and blood cultures   - Will contact [facility] for TVUS records from yesterday before ordering repeat TVUS     2. Constipation  - Dulcolax suppository now  - Colace BID, mag oxide QHS    3. Post operative care; POD#3 hysteroscope DC (9/20)  - Operative findings below; performed by Dr Dang for AUB   - Final path pending   - Patient reports she did well immediately post op; no vaginal complaints. Declined pelvic exam so unable to fully assess.      Findings:   Normal external female genitalia. Narrow vagina, no descensus of uterus with gentle traction on the tenaculum. Normal cervix with small atrophic external os. Only able to sound to 3 cm initially. 3 mm diagnostic scope inserted and polypoid mass vs fibroid visualized extending into the cervix, obstructing internal os. Operative scope inserted and Myosure reach used to remove tissue. Operative scope then able to be inserted into endometrial cavity. Normal bilateral ostia. Fluffy endometrium seen diffusely. Uterus sounded to 8 cm. Sharp curettage performed. Specimens obtained and sent separately to pathology.  Hemostasis obtained at the end of the procedure, fluid deficit 170 cc.    Dione Silvestre MD  Obstetrics &  Gynecology  [facility] ([address])

## 2024-09-23 NOTE — CARE UPDATE
Resident at bedside for serial abdominal exam.    She reports her pain is well controlled with current PRN meds.     Temp:  [98.8 °F (37.1 °C)-100.1 °F (37.8 °C)] 99 °F (37.2 °C)  Pulse:  [] 101  Resp:  [14-18] 16  SpO2:  [93 %-100 %] 96 %  BP: ()/(53-75) 107/57    VSS as above. HR   Exam: abdomen is soft, not tender, non distended, no guarding no rebound.   Extremities: no leg edema    Plan: continue current abx Rocephin/Doxy/flagyl, continue serial abdominal exams.    Howard Fernandez MD  Obstetrics and Gynecology- PGY2

## 2024-09-24 LAB
BASOPHILS # BLD AUTO: ABNORMAL K/UL (ref 0–0.2)
BASOPHILS NFR BLD: 0 % (ref 0–1.9)
DIFFERENTIAL METHOD BLD: ABNORMAL
EOSINOPHIL # BLD AUTO: ABNORMAL K/UL (ref 0–0.5)
EOSINOPHIL NFR BLD: 0 % (ref 0–8)
ERYTHROCYTE [DISTWIDTH] IN BLOOD BY AUTOMATED COUNT: 13.5 % (ref 11.5–14.5)
FINAL PATHOLOGIC DIAGNOSIS: NORMAL
GROSS: NORMAL
HCT VFR BLD AUTO: 28.5 % (ref 37–48.5)
HGB BLD-MCNC: 9.5 G/DL (ref 12–16)
IMM GRANULOCYTES # BLD AUTO: ABNORMAL K/UL (ref 0–0.04)
IMM GRANULOCYTES NFR BLD AUTO: ABNORMAL % (ref 0–0.5)
LYMPHOCYTES # BLD AUTO: ABNORMAL K/UL (ref 1–4.8)
LYMPHOCYTES NFR BLD: 11 % (ref 18–48)
Lab: NORMAL
MCH RBC QN AUTO: 30.6 PG (ref 27–31)
MCHC RBC AUTO-ENTMCNC: 33.3 G/DL (ref 32–36)
MCV RBC AUTO: 92 FL (ref 82–98)
MONOCYTES # BLD AUTO: ABNORMAL K/UL (ref 0.3–1)
MONOCYTES NFR BLD: 5 % (ref 4–15)
NEUTROPHILS NFR BLD: 80 % (ref 38–73)
NEUTS BAND NFR BLD MANUAL: 4 %
NRBC BLD-RTO: 0 /100 WBC
PLATELET # BLD AUTO: 155 K/UL (ref 150–450)
PLATELET BLD QL SMEAR: ABNORMAL
PMV BLD AUTO: 12.7 FL (ref 9.2–12.9)
RBC # BLD AUTO: 3.1 M/UL (ref 4–5.4)
WBC # BLD AUTO: 9.47 K/UL (ref 3.9–12.7)

## 2024-09-24 PROCEDURE — 25000003 PHARM REV CODE 250

## 2024-09-24 PROCEDURE — 63600175 PHARM REV CODE 636 W HCPCS: Mod: JZ,JG

## 2024-09-24 PROCEDURE — 25000003 PHARM REV CODE 250: Performed by: OBSTETRICS & GYNECOLOGY

## 2024-09-24 PROCEDURE — 11000001 HC ACUTE MED/SURG PRIVATE ROOM

## 2024-09-24 PROCEDURE — S4991 NICOTINE PATCH NONLEGEND: HCPCS | Performed by: OBSTETRICS & GYNECOLOGY

## 2024-09-24 PROCEDURE — 85007 BL SMEAR W/DIFF WBC COUNT: CPT | Performed by: OBSTETRICS & GYNECOLOGY

## 2024-09-24 PROCEDURE — 36415 COLL VENOUS BLD VENIPUNCTURE: CPT | Performed by: OBSTETRICS & GYNECOLOGY

## 2024-09-24 PROCEDURE — 99233 SBSQ HOSP IP/OBS HIGH 50: CPT | Mod: ,,, | Performed by: OBSTETRICS & GYNECOLOGY

## 2024-09-24 PROCEDURE — 85027 COMPLETE CBC AUTOMATED: CPT | Performed by: OBSTETRICS & GYNECOLOGY

## 2024-09-24 RX ORDER — DIPHENHYDRAMINE HYDROCHLORIDE 50 MG/ML
25 INJECTION INTRAMUSCULAR; INTRAVENOUS EVERY 6 HOURS PRN
Status: DISCONTINUED | OUTPATIENT
Start: 2024-09-24 | End: 2024-09-24

## 2024-09-24 RX ORDER — METRONIDAZOLE 500 MG/100ML
500 INJECTION, SOLUTION INTRAVENOUS
Status: DISCONTINUED | OUTPATIENT
Start: 2024-09-24 | End: 2024-09-25

## 2024-09-24 RX ORDER — FAMOTIDINE 10 MG/ML
40 INJECTION INTRAVENOUS ONCE
Status: COMPLETED | OUTPATIENT
Start: 2024-09-24 | End: 2024-09-24

## 2024-09-24 RX ORDER — DOXYCYCLINE HYCLATE 100 MG
100 TABLET ORAL EVERY 12 HOURS
Status: DISCONTINUED | OUTPATIENT
Start: 2024-09-24 | End: 2024-09-24

## 2024-09-24 RX ORDER — ONDANSETRON HYDROCHLORIDE 2 MG/ML
4 INJECTION, SOLUTION INTRAVENOUS EVERY 6 HOURS PRN
Status: DISCONTINUED | OUTPATIENT
Start: 2024-09-24 | End: 2024-09-24

## 2024-09-24 RX ORDER — BISACODYL 10 MG/1
10 SUPPOSITORY RECTAL ONCE
Status: COMPLETED | OUTPATIENT
Start: 2024-09-24 | End: 2024-09-24

## 2024-09-24 RX ORDER — DIPHENHYDRAMINE HYDROCHLORIDE 50 MG/ML
25 INJECTION INTRAMUSCULAR; INTRAVENOUS ONCE AS NEEDED
Status: COMPLETED | OUTPATIENT
Start: 2024-09-24 | End: 2024-09-24

## 2024-09-24 RX ORDER — FAMOTIDINE 20 MG/1
40 TABLET, FILM COATED ORAL DAILY
Status: DISCONTINUED | OUTPATIENT
Start: 2024-09-25 | End: 2024-09-26 | Stop reason: HOSPADM

## 2024-09-24 RX ORDER — IBUPROFEN 200 MG
1 TABLET ORAL DAILY
Status: DISCONTINUED | OUTPATIENT
Start: 2024-09-24 | End: 2024-09-26 | Stop reason: HOSPADM

## 2024-09-24 RX ORDER — METRONIDAZOLE 250 MG/1
500 TABLET ORAL EVERY 12 HOURS
Status: DISCONTINUED | OUTPATIENT
Start: 2024-09-24 | End: 2024-09-24

## 2024-09-24 RX ORDER — HYDROMORPHONE HYDROCHLORIDE 1 MG/ML
0.5 INJECTION, SOLUTION INTRAMUSCULAR; INTRAVENOUS; SUBCUTANEOUS EVERY 4 HOURS PRN
Status: DISCONTINUED | OUTPATIENT
Start: 2024-09-24 | End: 2024-09-24

## 2024-09-24 RX ORDER — ONDANSETRON HYDROCHLORIDE 2 MG/ML
4 INJECTION, SOLUTION INTRAVENOUS ONCE
Status: COMPLETED | OUTPATIENT
Start: 2024-09-24 | End: 2024-09-24

## 2024-09-24 RX ADMIN — GABAPENTIN 300 MG: 300 CAPSULE ORAL at 08:09

## 2024-09-24 RX ADMIN — SIMETHICONE 80 MG: 80 TABLET, CHEWABLE ORAL at 08:09

## 2024-09-24 RX ADMIN — NICOTINE 1 PATCH: 14 PATCH TRANSDERMAL at 09:09

## 2024-09-24 RX ADMIN — HYDROCODONE BITARTRATE AND ACETAMINOPHEN 1 TABLET: 10; 325 TABLET ORAL at 08:09

## 2024-09-24 RX ADMIN — IBUPROFEN 600 MG: 600 TABLET, FILM COATED ORAL at 05:09

## 2024-09-24 RX ADMIN — ONDANSETRON 8 MG: 8 TABLET, ORALLY DISINTEGRATING ORAL at 08:09

## 2024-09-24 RX ADMIN — IBUPROFEN 600 MG: 600 TABLET, FILM COATED ORAL at 11:09

## 2024-09-24 RX ADMIN — DIPHENHYDRAMINE HYDROCHLORIDE 25 MG: 50 INJECTION, SOLUTION INTRAMUSCULAR; INTRAVENOUS at 03:09

## 2024-09-24 RX ADMIN — METRONIDAZOLE 500 MG: 5 INJECTION, SOLUTION INTRAVENOUS at 05:09

## 2024-09-24 RX ADMIN — HYDROCODONE BITARTRATE AND ACETAMINOPHEN 1 TABLET: 10; 325 TABLET ORAL at 11:09

## 2024-09-24 RX ADMIN — DIPHENHYDRAMINE HYDROCHLORIDE 25 MG: 50 INJECTION, SOLUTION INTRAMUSCULAR; INTRAVENOUS at 08:09

## 2024-09-24 RX ADMIN — HYDROCODONE BITARTRATE AND ACETAMINOPHEN 1 TABLET: 10; 325 TABLET ORAL at 01:09

## 2024-09-24 RX ADMIN — HYDROCODONE BITARTRATE AND ACETAMINOPHEN 1 TABLET: 10; 325 TABLET ORAL at 05:09

## 2024-09-24 RX ADMIN — METRONIDAZOLE 500 MG: 5 INJECTION, SOLUTION INTRAVENOUS at 12:09

## 2024-09-24 RX ADMIN — HYDROMORPHONE HYDROCHLORIDE 2 MG: 2 INJECTION, SOLUTION INTRAMUSCULAR; INTRAVENOUS; SUBCUTANEOUS at 02:09

## 2024-09-24 RX ADMIN — CEFTRIAXONE SODIUM 1 G: 1 INJECTION, POWDER, FOR SOLUTION INTRAMUSCULAR; INTRAVENOUS at 05:09

## 2024-09-24 RX ADMIN — HYDROMORPHONE HYDROCHLORIDE 2 MG: 2 INJECTION, SOLUTION INTRAMUSCULAR; INTRAVENOUS; SUBCUTANEOUS at 06:09

## 2024-09-24 RX ADMIN — DOCUSATE SODIUM 100 MG: 100 CAPSULE, LIQUID FILLED ORAL at 09:09

## 2024-09-24 RX ADMIN — Medication 400 MG: at 08:09

## 2024-09-24 RX ADMIN — METRONIDAZOLE 500 MG: 5 INJECTION, SOLUTION INTRAVENOUS at 08:09

## 2024-09-24 RX ADMIN — GABAPENTIN 300 MG: 300 CAPSULE ORAL at 02:09

## 2024-09-24 RX ADMIN — DOXYCYCLINE 100 MG: 100 INJECTION, POWDER, LYOPHILIZED, FOR SOLUTION INTRAVENOUS at 09:09

## 2024-09-24 RX ADMIN — ONDANSETRON 4 MG: 2 INJECTION INTRAMUSCULAR; INTRAVENOUS at 09:09

## 2024-09-24 RX ADMIN — GABAPENTIN 300 MG: 300 CAPSULE ORAL at 09:09

## 2024-09-24 RX ADMIN — HYDROMORPHONE HYDROCHLORIDE 2 MG: 2 INJECTION, SOLUTION INTRAMUSCULAR; INTRAVENOUS; SUBCUTANEOUS at 12:09

## 2024-09-24 RX ADMIN — SIMETHICONE 80 MG: 80 TABLET, CHEWABLE ORAL at 09:09

## 2024-09-24 RX ADMIN — DOCUSATE SODIUM 100 MG: 100 CAPSULE, LIQUID FILLED ORAL at 08:09

## 2024-09-24 RX ADMIN — FAMOTIDINE 40 MG: 10 INJECTION, SOLUTION INTRAVENOUS at 09:09

## 2024-09-24 RX ADMIN — SIMETHICONE 80 MG: 80 TABLET, CHEWABLE ORAL at 02:09

## 2024-09-24 RX ADMIN — MUPIROCIN: 20 OINTMENT TOPICAL at 09:09

## 2024-09-24 RX ADMIN — HYDROCODONE BITARTRATE AND ACETAMINOPHEN 1 TABLET: 10; 325 TABLET ORAL at 03:09

## 2024-09-24 RX ADMIN — BISACODYL 10 MG: 10 SUPPOSITORY RECTAL at 09:09

## 2024-09-24 NOTE — NURSING
Patient did not tolerate brown bomb, only was able to insert 50-75cc.  Small to moderate brown stool noted right after, loose. GYN team notified. Will continue to monitor

## 2024-09-24 NOTE — PROGRESS NOTES
"[facility] ([address])  Obstetrics & Gynecology  Progress Note    Patient Name: Wanda Singleton  MRN: [id number]  Admission Date: 2024  Primary Care Provider: Jas Gusman (Inactive)  Principal Problem: <principal problem not specified>    Subjective:     History of Present Illness:  Patient is a 44 y.o.  POD#2 hysteroscope DC (AUB) transferred from [place] for sepsis  TOA.       Patient reports she underwent hysteroscope DC on  was discharged same day. Per operative note, removal of endocervical polyp vs fibroid and then endometrium polypoid in appearance. Final path pending. Uncomplicated. She went home and took Tylenol, Ibuprofen and Norco for pain control. She denies any nausea or vomiting however reports decreased appetite. Normal urination. Has not had a BM in 1 week however this is her baseline. Has not passed gas since prior to surgery. Reports fever 101.2 at home. Reports sudden onset abdominal and back pain that woke her in her sleep  morning. Presented to [facility] ER. They transferred her to [facility] due to suspected sepsis (WBC 17, fever) secondary to suspected bilateral TOA seen on TVUS. She received IV gent and IV vanc.      On arrival to [facility], she received IV Dilaudid 1mg and IV Toradol 30mg with minimal improvement in pain. She reports h/o stage IV endometriosis diagnosed by laparoscopy. Desires a hysterectomy. She is frustrated with her pain and "wants everything out." She reports she metabolizes narcotics very quickly and has difficulty with pain control.      PMH: endometriosis stage IV, basal cell carcinoma (denies melanoma)  PSH: hys DC, laparoscopy x2      OB: denies     Interval History: NAEON. Attempted to give enema per patient request last night, but patient was only able to tolerate 50-75 cc of enema with resultant loose bowel movement. She continues to pass gas and is having small frequent bowel movements. She " reports a significant improvement in her pain and abdominal distension overnight. Ambulating, voiding without issue. Continues to have intermittent nausea and decreased appetite but is wanting a cup of coffee this morning. Denies fever, chills, emesis.    Scheduled Meds:   cefTRIAXone (Rocephin) IV (PEDS and ADULTS)  1 g Intravenous Q24H    docusate sodium  100 mg Oral BID    doxycycline IV (PEDS and ADULTS)  100 mg Intravenous Q12H    gabapentin  300 mg Oral TID    ibuprofen  600 mg Oral Q6H    magnesium oxide  400 mg Oral QHS    metroNIDAZOLE IV (PEDS and ADULTS)  500 mg Intravenous Q8H    mupirocin   Nasal BID    polyethylene glycol  17 g Oral Daily    simethicone  1 tablet Oral TID     Continuous Infusions:   lactated ringers   Intravenous Continuous 100 mL/hr at 09/22/24 1959 New Bag at 09/22/24 1959     PRN Meds:  Current Facility-Administered Medications:     HYDROcodone-acetaminophen, 1 tablet, Oral, Q4H PRN    HYDROcodone-acetaminophen, 1 tablet, Oral, Q4H PRN    HYDROmorphone, 2 mg, Intravenous, Q4H PRN    ondansetron, 8 mg, Oral, Q8H PRN    sodium chloride 0.9%, 10 mL, Intravenous, PRN    Review of patient's allergies indicates:   Allergen Reactions    Bee's [allergen ext-venom-honey bee] Anaphylaxis    Fluticasone Anxiety, Other (See Comments), Palpitations and Shortness Of Breath    Guaifenesin Anxiety, Hallucinations, Palpitations and Shortness Of Breath    Horseradish root extract Anaphylaxis, Hives, Nausea And Vomiting, Palpitations, Shortness Of Breath and Swelling    Adhesive Hives, Itching and Rash    Oxycodone Hives and Itching     Tolerates Dilaudid, morphine, hydrocodone.   Metabolizes pain meds quickly (redhead) does better with lower dose meds that can be taken more frequently.     Bleach (sodium hypochlorite)     Levofloxacin     Latex, natural rubber Hives, Itching and Rash    Penicillins Hives and Nausea And Vomiting    Sulfa (sulfonamide antibiotics) Anxiety, Diarrhea, Hives, Itching,  Nausea And Vomiting and Palpitations       Objective:     Vital Signs (Most Recent):  Temp: 98.2 °F (36.8 °C) (09/24/24 0200)  Pulse: 104 (09/24/24 0200)  Resp: 18 (09/24/24 0541)  BP: (!) 100/55 (09/24/24 0200)  SpO2: 99 % (09/24/24 0200) Vital Signs (24h Range):  Temp:  [97.3 °F (36.3 °C)-98.2 °F (36.8 °C)] 98.2 °F (36.8 °C)  Pulse:  [] 104  Resp:  [13-18] 18  SpO2:  [96 %-99 %] 99 %  BP: ()/(51-65) 100/55     Weight: 63.8 kg (140 lb 10.5 oz)  Body mass index is 24.92 kg/m².  Patient's last menstrual period was 09/14/2024 (exact date).    I&O (Last 24H):    Intake/Output Summary (Last 24 hours) at 9/24/2024 0623  Last data filed at 9/23/2024 2045  Gross per 24 hour   Intake 340 ml   Output 550 ml   Net -210 ml     Physical Exam:  GEN: Mild distress secondary to pain. Alert and oriented. Tearful.   CV: Regular rate and rhythm. Extremities warm and well perfused.   LUNGS: Breathing comfortably on RA, no increased WOB.   ABDOMEN: Soft, non-distended, non-tender, no guarding, no rebound tenderness. No peritoneal signs.  EXT: No erythema, no edema  : declined on admission    Recent Labs   Lab 09/20/24  1122 09/22/24  1558 09/23/24  0521   WBC 6.38 13.73* 6.89   HGB 11.6* 9.8* 9.9*   HCT 35.4* 30.0* 30.8*   MCV 92 93 93    154 143*            Recent Labs   Lab 09/20/24  1122 09/22/24  1839    137   K 4.4 4.1   * 106   CO2 22* 23   BUN 6 7   CREATININE 0.7 0.7   GLU 89 92   PROT  --  6.7   BILITOT  --  0.7   ALKPHOS  --  83   ALT  --  58*   AST  --  68*     Blood Culture: NGTD x2    Imaging Results                  X-Ray Chest AP Portable (Final result)  Result time 09/22/24 17:15:04            Final result by Deni Torrez MD (09/22/24 17:15:04)                           Impression:        No acute abnormality.        Electronically signed by:Deni Torrez  Date:                                            09/22/2024  Time:                                            17:15                      Narrative:     EXAMINATION:  XR CHEST AP PORTABLE     CLINICAL HISTORY:  Fever, unspecified     TECHNIQUE:  Single frontal view of the chest was performed.     COMPARISON:  None     FINDINGS:  The lungs are clear, with normal appearance of pulmonary vasculature and no pleural effusion or pneumothorax.     The cardiac silhouette is normal in size. The hilar and mediastinal contours are unremarkable.     Bones are intact.                                                CT Abdomen Pelvis With IV Contrast NO Oral Contrast (Final result)  Result time 09/22/24 17:04:21            Final result by Ash Sanchez MD (09/22/24 17:04:21)                           Impression:        Large volume free fluid in the pelvis with enhancing bilateral adnexal tubular structures concerning for tubo-ovarian abscess, pyocele, or hydrosalpinx.  Further evaluation pelvic ultrasound is recommended.  Clinical correlation is recommended.        Electronically signed by:Ash Sanchez MD  Date:                                            09/22/2024  Time:                                            17:04                     Narrative:     EXAMINATION:  CT ABDOMEN PELVIS WITH IV CONTRAST     CLINICAL HISTORY:  Abdominal abscess/infection suspected;     TECHNIQUE:  Axial images of the abdomen and pelvis were acquired  after the use of 75 cc [id number] IV contrast.  Coronal and sagittal reconstructions were also obtained     COMPARISON:  None     FINDINGS:  Heart: Normal in size. No pericardial effusion.     Lungs: Atelectasis of the lower lobes bilaterally.     Liver: Normal in size and contour.  No focal hepatic lesion.     Gallbladder: No calcified gallstones.     Bile Ducts: No evidence of dilated ducts.     Pancreas: No mass or peripancreatic fat stranding.     Spleen: Unremarkable.     Stomach and duodenum: Unremarkable.     Adrenals: Unremarkable.     Kidneys/ Ureters: Normal in size and location. Normal enhancement. No  hydronephrosis or nephrolithiasis. No ureteral dilatation.     Bladder: No evidence of wall thickening.     Reproductive organs: Uterus demonstrates heterogeneous enhancement.  Fluid within the endometrial canal.  1.2 cm enhancing focus in the myometrium likely representing a fibroid.  There is a significant amount of free fluid in the pelvis with bilateral tubular enhancing lesions of the adnexal regions.  Findings are suspicious for tubo-ovarian abscesses or pyosalpinx.     Bowel/Mesentery: Small bowel is normal in caliber with no evidence of obstruction. No evidence of inflammation or wall thickening.  Normal appendix.  Colon demonstrates no focal wall thickening.  Moderate volume of stool throughout the colon.     Lymph nodes: No lymphadenapathy.     Vasculature: No aneurysm. No significant calcific atherosclerosis.     Abdominal wall:  Unremarkable.     Bones: No acute fracture. No suspicious osseous lesions.                 Outside Hospital TVUS 2024:    The uterus is 9.6 x 5.6 x 9.3 cm in length. Heterogeneous area within the uterus measuring 3.5 x 1.6 x 3.0 cm with mild hyperemia.    The ovaries are not clearly identified. Complex collections within the bilateral adnexa.    There is no abnormal intraperitoneal fluid collection.    CONCLUSION:    Heterogeneous collection within the uterus with mild hyperemia. Findings can represent retained products of conception, blood products or endometrial nidus.    Heterogeneous collections involving the bilateral adnexa. Findings can suggest pelvic inflammatory disease. Abscess is within the differential diagnosis.      Assessment/Plan:     Wanda Singleton is a 44 y.o.  who presents with sepsis 2/2 bilateral TOA in setting of stage IV endometriosis. She is POD4 hysteroscope D&C. She remains hemodynamically stable without an acute abdomen. Admitting for IV abx, pain control, and serial abdominal exams.     Sepsis 2/2 bilateral TOA   - On presentation  to OSH; reported fever 101.2 at home however afebrile at OSH; WBC 17, lactate WNL. TVUS with concern for bilateral TOA however ovaries not well visualized. Received IV gent/vanc.   - On transfer to [facility], presented afebrile, normal HR and normotensive (MAPs 70s).   - Exam; abdomen tender most in LLQ. Patient declined a pelvic exam.   - Labs: WBC 13. Lactate, lipase WNL. Blood and urine cultures pending.   - CT AP as above; notable free fluid in abdomen and concern for bilateral TOAs.  - On admission, discussed recommendation of conservative management of 24-48h IV antibiotics and possible surgical management if signs of ruptured TOA. Imaging with free fluid in pelvis, however patient has remained hemodynamically stable without signs of acute abdomen. In setting of known endometriosis patient is high risk for intra operative injury and conversion to laparotomy. Patient ultimately desires hysterectomy however she understands importance of initial treatment with IV antibiotics.   - WBC downtrended (13 > 6)  - Continue IV rocephin/doxy/flagyl until 48hrs then transition to PO  - Transitioned from IV to PO pain medications yesterday   - gabapentin 300 mg TID   - ibuprofen 600 mg q6h   - Norco 5/10 q4h PRN   - IV Dilaudid PRN breakthrough pain - recommend weaning off IV pain medications as tolerated to progress toward discharge goals  - TVUS not repeated as results from OSH in media, read as above  - Blood cultures NGTD x2  - UA did not reflex to culture as WBCs <10 per inpatient lab  - Reg diet  - Serial abdominal exams  - Encouraged ambulation    2. Constipation  - scheduled Colace BID, mag oxide QHS, simethicone TID  - s/p Dulcolax suppository, patient desires another this AM  - s/p brown bomb enema, only able to tolerate small portion    3. Post operative care; POD#3 hysteroscope DC (9/20)  - Operative findings below; performed by Dr Dang for AUB   - Final path pending   - Patient reports she did well  immediately post op; no vaginal complaints. Declined pelvic exam so unable to fully assess.      Findings:   Normal external female genitalia. Narrow vagina, no descensus of uterus with gentle traction on the tenaculum. Normal cervix with small atrophic external os. Only able to sound to 3 cm initially. 3 mm diagnostic scope inserted and polypoid mass vs fibroid visualized extending into the cervix, obstructing internal os. Operative scope inserted and Myosure reach used to remove tissue. Operative scope then able to be inserted into endometrial cavity. Normal bilateral ostia. Fluffy endometrium seen diffusely. Uterus sounded to 8 cm. Sharp curettage performed. Specimens obtained and sent separately to pathology.  Hemostasis obtained at the end of the procedure, fluid deficit 170 cc.    Dione Silvestre MD  Obstetrics & Gynecology  [facility] ([address])

## 2024-09-24 NOTE — PROGRESS NOTES
"Presented to bedside with gynecology staff Dr. Vilchis for PM assessment.    S: Patient reports she "feels like death". She continues to feel nauseous and have trouble tolerating PO intake but declines episodes of emesis. Continues to have small frequent bowel movements with passage of flatus. Reports diffuse body itching and specifically requesting Benadryl. Reports she feels an increase in bloating since this afternoon and stable lower pelvic pain.    O: BP (!) 102/59 (BP Location: Left arm, Patient Position: Sitting)   Pulse 91   Temp 97.3 °F (36.3 °C) (Oral)   Resp 16   Ht 5' 3" (1.6 m)   Wt 63.8 kg (140 lb 10.5 oz)   LMP 09/14/2024 (Exact Date)   SpO2 98%   Breastfeeding No   BMI 24.92 kg/m²     Physical Exam  General: alert and oriented, resting in bed in no acute distress. Eyes are heavy throughout interview  Skin: warm and dry without pallor, no erythema, rash, or excoriations. Extremities warm and well perfused without erythema  Abdomen: soft, non-distended, mild TTP in lower abdomen/pelvic region but overall improved to prior exams, active bowel sounds in all quadrants    Recent Labs   Lab 09/24/24  0943   WBC 9.47   RBC 3.10*   HGB 9.5*   HCT 28.5*      MCV 92   MCH 30.6   MCHC 33.3     Assessment/Plan:  - Patient and partner with concern about CBC from this AM. Ordered CBC per patient request. Discussed that results are stable, hemoglobin from four days likely hemo-concentrated sample and now becoming diluted as patient is on IV fluids.  - IV Benadryl 25 mg once for itching  - Decrease dose of PRN Dilaudid for breakthrough pain to aid in weaning IV narcotics prior to planned discharge. Additionally, frequent opioids with limited PO intake likely contributing to nausea.  - Repeat TVUS ordered per patient request, previously declined due to pain but now reports she will be able to tolerate it  - Continue bowel regimen  - Zofran PRN nausea  - Continue to advance diet as tolerated  - Continue IV " antibiotics for full 48 hours    Dione Silvestre MD  OB/GYN PGY-1

## 2024-09-25 LAB
ANION GAP SERPL CALC-SCNC: 9 MMOL/L (ref 8–16)
BUN SERPL-MCNC: 6 MG/DL (ref 6–20)
CALCIUM SERPL-MCNC: 8.9 MG/DL (ref 8.7–10.5)
CHLORIDE SERPL-SCNC: 101 MMOL/L (ref 95–110)
CO2 SERPL-SCNC: 25 MMOL/L (ref 23–29)
CREAT SERPL-MCNC: 0.7 MG/DL (ref 0.5–1.4)
EST. GFR  (NO RACE VARIABLE): >60 ML/MIN/1.73 M^2
GLUCOSE SERPL-MCNC: 87 MG/DL (ref 70–110)
POTASSIUM SERPL-SCNC: 3.6 MMOL/L (ref 3.5–5.1)
SODIUM SERPL-SCNC: 135 MMOL/L (ref 136–145)

## 2024-09-25 PROCEDURE — 99233 SBSQ HOSP IP/OBS HIGH 50: CPT | Mod: ,,, | Performed by: OBSTETRICS & GYNECOLOGY

## 2024-09-25 PROCEDURE — 36415 COLL VENOUS BLD VENIPUNCTURE: CPT

## 2024-09-25 PROCEDURE — 25000003 PHARM REV CODE 250

## 2024-09-25 PROCEDURE — 25000003 PHARM REV CODE 250: Performed by: OBSTETRICS & GYNECOLOGY

## 2024-09-25 PROCEDURE — 63600175 PHARM REV CODE 636 W HCPCS

## 2024-09-25 PROCEDURE — 80048 BASIC METABOLIC PNL TOTAL CA: CPT

## 2024-09-25 PROCEDURE — S4991 NICOTINE PATCH NONLEGEND: HCPCS | Performed by: OBSTETRICS & GYNECOLOGY

## 2024-09-25 PROCEDURE — 11000001 HC ACUTE MED/SURG PRIVATE ROOM

## 2024-09-25 RX ORDER — DOCUSATE SODIUM 100 MG/1
100 CAPSULE, LIQUID FILLED ORAL 2 TIMES DAILY
Qty: 60 CAPSULE | Refills: 1 | Status: ON HOLD | OUTPATIENT
Start: 2024-09-25 | End: 2024-10-18 | Stop reason: HOSPADM

## 2024-09-25 RX ORDER — ONDANSETRON 8 MG/1
8 TABLET, ORALLY DISINTEGRATING ORAL EVERY 8 HOURS PRN
Status: DISCONTINUED | OUTPATIENT
Start: 2024-09-25 | End: 2024-09-26 | Stop reason: HOSPADM

## 2024-09-25 RX ORDER — SIMETHICONE 80 MG
80 TABLET,CHEWABLE ORAL 3 TIMES DAILY
Qty: 30 TABLET | Refills: 0 | Status: ON HOLD | OUTPATIENT
Start: 2024-09-25 | End: 2024-10-18 | Stop reason: HOSPADM

## 2024-09-25 RX ORDER — ONDANSETRON 8 MG/1
8 TABLET, ORALLY DISINTEGRATING ORAL EVERY 8 HOURS PRN
Qty: 30 TABLET | Refills: 1 | Status: SHIPPED | OUTPATIENT
Start: 2024-09-25

## 2024-09-25 RX ORDER — FAMOTIDINE 40 MG/1
40 TABLET, FILM COATED ORAL DAILY
Qty: 30 TABLET | Refills: 3 | Status: SHIPPED | OUTPATIENT
Start: 2024-09-26 | End: 2025-01-24

## 2024-09-25 RX ORDER — IBUPROFEN 600 MG/1
600 TABLET ORAL EVERY 6 HOURS
Qty: 60 TABLET | Refills: 1 | Status: ON HOLD | OUTPATIENT
Start: 2024-09-25 | End: 2024-10-18 | Stop reason: HOSPADM

## 2024-09-25 RX ORDER — GABAPENTIN 300 MG/1
300 CAPSULE ORAL 3 TIMES DAILY
Qty: 90 CAPSULE | Refills: 0 | Status: SHIPPED | OUTPATIENT
Start: 2024-09-25 | End: 2024-09-26 | Stop reason: HOSPADM

## 2024-09-25 RX ORDER — METRONIDAZOLE 500 MG/1
500 TABLET ORAL EVERY 12 HOURS
Qty: 26 TABLET | Refills: 0 | Status: SHIPPED | OUTPATIENT
Start: 2024-09-25 | End: 2024-10-09

## 2024-09-25 RX ORDER — METRONIDAZOLE 250 MG/1
500 TABLET ORAL EVERY 12 HOURS
Status: DISCONTINUED | OUTPATIENT
Start: 2024-09-25 | End: 2024-09-26 | Stop reason: HOSPADM

## 2024-09-25 RX ORDER — HYDROMORPHONE HYDROCHLORIDE 1 MG/ML
1 INJECTION, SOLUTION INTRAMUSCULAR; INTRAVENOUS; SUBCUTANEOUS ONCE
Status: COMPLETED | OUTPATIENT
Start: 2024-09-25 | End: 2024-09-25

## 2024-09-25 RX ORDER — METOCLOPRAMIDE 10 MG/1
10 TABLET ORAL EVERY 6 HOURS PRN
Status: DISCONTINUED | OUTPATIENT
Start: 2024-09-25 | End: 2024-09-25

## 2024-09-25 RX ORDER — METOCLOPRAMIDE 10 MG/1
10 TABLET ORAL
Status: DISCONTINUED | OUTPATIENT
Start: 2024-09-25 | End: 2024-09-26 | Stop reason: HOSPADM

## 2024-09-25 RX ORDER — DIPHENHYDRAMINE HYDROCHLORIDE 50 MG/ML
25 INJECTION INTRAMUSCULAR; INTRAVENOUS EVERY 6 HOURS PRN
Status: DISCONTINUED | OUTPATIENT
Start: 2024-09-25 | End: 2024-09-25

## 2024-09-25 RX ORDER — METOCLOPRAMIDE 10 MG/1
10 TABLET ORAL EVERY 6 HOURS
Qty: 56 TABLET | Refills: 0 | Status: SHIPPED | OUTPATIENT
Start: 2024-09-25 | End: 2024-10-10

## 2024-09-25 RX ORDER — DIPHENHYDRAMINE HCL 25 MG
25 CAPSULE ORAL ONCE
Status: COMPLETED | OUTPATIENT
Start: 2024-09-25 | End: 2024-09-25

## 2024-09-25 RX ORDER — HYDROCODONE BITARTRATE AND ACETAMINOPHEN 5; 325 MG/1; MG/1
1 TABLET ORAL EVERY 4 HOURS PRN
Qty: 30 TABLET | Refills: 0 | Status: SHIPPED | OUTPATIENT
Start: 2024-09-25 | End: 2024-10-09 | Stop reason: SDUPTHER

## 2024-09-25 RX ORDER — DOXYCYCLINE HYCLATE 100 MG
100 TABLET ORAL EVERY 12 HOURS
Qty: 26 TABLET | Refills: 0 | Status: SHIPPED | OUTPATIENT
Start: 2024-09-25 | End: 2024-10-09

## 2024-09-25 RX ORDER — BISACODYL 10 MG/1
10 SUPPOSITORY RECTAL DAILY PRN
Qty: 8 SUPPOSITORY | Refills: 0 | Status: ON HOLD | OUTPATIENT
Start: 2024-09-25 | End: 2024-10-18 | Stop reason: HOSPADM

## 2024-09-25 RX ORDER — LANOLIN ALCOHOL/MO/W.PET/CERES
400 CREAM (GRAM) TOPICAL NIGHTLY PRN
Qty: 30 TABLET | Refills: 0 | Status: ON HOLD | OUTPATIENT
Start: 2024-09-25 | End: 2024-10-18 | Stop reason: HOSPADM

## 2024-09-25 RX ORDER — DOXYCYCLINE HYCLATE 100 MG
100 TABLET ORAL EVERY 12 HOURS
Status: DISCONTINUED | OUTPATIENT
Start: 2024-09-25 | End: 2024-09-26 | Stop reason: HOSPADM

## 2024-09-25 RX ORDER — BISACODYL 10 MG/1
10 SUPPOSITORY RECTAL DAILY PRN
Status: DISCONTINUED | OUTPATIENT
Start: 2024-09-25 | End: 2024-09-26 | Stop reason: HOSPADM

## 2024-09-25 RX ADMIN — SIMETHICONE 80 MG: 80 TABLET, CHEWABLE ORAL at 08:09

## 2024-09-25 RX ADMIN — GABAPENTIN 300 MG: 300 CAPSULE ORAL at 02:09

## 2024-09-25 RX ADMIN — METRONIDAZOLE 500 MG: 250 TABLET ORAL at 08:09

## 2024-09-25 RX ADMIN — DIPHENHYDRAMINE HYDROCHLORIDE 25 MG: 25 CAPSULE ORAL at 04:09

## 2024-09-25 RX ADMIN — DOCUSATE SODIUM 100 MG: 100 CAPSULE, LIQUID FILLED ORAL at 09:09

## 2024-09-25 RX ADMIN — SIMETHICONE 80 MG: 80 TABLET, CHEWABLE ORAL at 02:09

## 2024-09-25 RX ADMIN — METOCLOPRAMIDE 10 MG: 10 TABLET ORAL at 02:09

## 2024-09-25 RX ADMIN — HYDROMORPHONE HYDROCHLORIDE 1 MG: 1 INJECTION, SOLUTION INTRAMUSCULAR; INTRAVENOUS; SUBCUTANEOUS at 08:09

## 2024-09-25 RX ADMIN — DOXYCYCLINE HYCLATE 100 MG: 100 TABLET, COATED ORAL at 08:09

## 2024-09-25 RX ADMIN — METRONIDAZOLE 500 MG: 5 INJECTION, SOLUTION INTRAVENOUS at 04:09

## 2024-09-25 RX ADMIN — IBUPROFEN 600 MG: 600 TABLET, FILM COATED ORAL at 11:09

## 2024-09-25 RX ADMIN — HYDROCODONE BITARTRATE AND ACETAMINOPHEN 1 TABLET: 10; 325 TABLET ORAL at 04:09

## 2024-09-25 RX ADMIN — METOCLOPRAMIDE 10 MG: 10 TABLET ORAL at 08:09

## 2024-09-25 RX ADMIN — SODIUM CHLORIDE, POTASSIUM CHLORIDE, SODIUM LACTATE AND CALCIUM CHLORIDE: 600; 310; 30; 20 INJECTION, SOLUTION INTRAVENOUS at 04:09

## 2024-09-25 RX ADMIN — Medication 400 MG: at 08:09

## 2024-09-25 RX ADMIN — DIPHENHYDRAMINE HYDROCHLORIDE 25 MG: 50 INJECTION, SOLUTION INTRAMUSCULAR; INTRAVENOUS at 09:09

## 2024-09-25 RX ADMIN — GABAPENTIN 300 MG: 300 CAPSULE ORAL at 08:09

## 2024-09-25 RX ADMIN — DOCUSATE SODIUM 100 MG: 100 CAPSULE, LIQUID FILLED ORAL at 08:09

## 2024-09-25 RX ADMIN — NICOTINE 1 PATCH: 14 PATCH TRANSDERMAL at 08:09

## 2024-09-25 RX ADMIN — BISACODYL 10 MG: 10 SUPPOSITORY RECTAL at 08:09

## 2024-09-25 RX ADMIN — IBUPROFEN 600 MG: 600 TABLET, FILM COATED ORAL at 05:09

## 2024-09-25 RX ADMIN — FAMOTIDINE 40 MG: 20 TABLET ORAL at 08:09

## 2024-09-25 RX ADMIN — IBUPROFEN 600 MG: 600 TABLET, FILM COATED ORAL at 04:09

## 2024-09-25 RX ADMIN — DIPHENHYDRAMINE HYDROCHLORIDE 25 MG: 50 INJECTION, SOLUTION INTRAMUSCULAR; INTRAVENOUS at 08:09

## 2024-09-25 RX ADMIN — MUPIROCIN: 20 OINTMENT TOPICAL at 08:09

## 2024-09-25 RX ADMIN — HYDROCODONE BITARTRATE AND ACETAMINOPHEN 1 TABLET: 10; 325 TABLET ORAL at 08:09

## 2024-09-25 NOTE — PROGRESS NOTES
"[facility] ([address])  Obstetrics & Gynecology  Progress Note    Patient Name: Wanda Singleton  MRN: [id number]  Admission Date: 2024  Primary Care Provider: Jas Gusman (Inactive)  Principal Problem: <principal problem not specified>    Subjective:     History of Present Illness:  Patient is a 44 y.o.  POD#2 hysteroscope DC (AUB) transferred from [place] for sepsis  TOA.       Patient reports she underwent hysteroscope DC on  was discharged same day. Per operative note, removal of endocervical polyp vs fibroid and then endometrium polypoid in appearance. Final path pending. Uncomplicated. She went home and took Tylenol, Ibuprofen and Norco for pain control. She denies any nausea or vomiting however reports decreased appetite. Normal urination. Has not had a BM in 1 week however this is her baseline. Has not passed gas since prior to surgery. Reports fever 101.2 at home. Reports sudden onset abdominal and back pain that woke her in her sleep  morning. Presented to [facility] ER. They transferred her to [facility] due to suspected sepsis (WBC 17, fever) secondary to suspected bilateral TOA seen on TVUS. She received IV gent and IV vanc.      On arrival to [facility], she received IV Dilaudid 1mg and IV Toradol 30mg with minimal improvement in pain. She reports h/o stage IV endometriosis diagnosed by laparoscopy. Desires a hysterectomy. She is frustrated with her pain and "wants everything out." She reports she metabolizes narcotics very quickly and has difficulty with pain control.      PMH: endometriosis stage IV, basal cell carcinoma (denies melanoma)  PSH: hys DC, laparoscopy x2      OB: denies     Interval History: NAEON. Reports she slept more than usual last night. Continues to struggle with nausea and symptoms of GERD. Pain controlled overnight on oral medications. Requesting IV pain medications due to her nausea. Complains of continued " constipation and reports she really wants to have more bowel function. She is drinking water by mouth but having trouble tolerating food. Denies emesis. Abdominal pain is stable. Requesting IV Benadryl for itching.    Scheduled Meds:   docusate sodium  100 mg Oral BID    doxycycline  100 mg Oral Q12H    famotidine  40 mg Oral Daily    gabapentin  300 mg Oral TID    ibuprofen  600 mg Oral Q6H    magnesium oxide  400 mg Oral QHS    metroNIDAZOLE  500 mg Oral Q12H    mupirocin   Nasal BID    nicotine  1 patch Transdermal Daily    simethicone  1 tablet Oral TID     Continuous Infusions:   lactated ringers   Intravenous Continuous   Stopped at 09/25/24 0407     PRN Meds:  Current Facility-Administered Medications:     bisacodyL, 10 mg, Rectal, Daily PRN    diphenhydrAMINE, 25 mg, Intravenous, Q6H PRN    HYDROcodone-acetaminophen, 1 tablet, Oral, Q4H PRN    HYDROcodone-acetaminophen, 1 tablet, Oral, Q4H PRN    metoclopramide HCl, 10 mg, Oral, Q6H PRN    ondansetron, 8 mg, Oral, Q8H PRN    sodium chloride 0.9%, 10 mL, Intravenous, PRN    Review of patient's allergies indicates:   Allergen Reactions    Bee's [allergen ext-venom-honey bee] Anaphylaxis    Fluticasone Anxiety, Other (See Comments), Palpitations and Shortness Of Breath    Guaifenesin Anxiety, Hallucinations, Palpitations and Shortness Of Breath    Horseradish root extract Anaphylaxis, Hives, Nausea And Vomiting, Palpitations, Shortness Of Breath and Swelling    Adhesive Hives, Itching and Rash    Oxycodone Hives and Itching     Tolerates Dilaudid, morphine, hydrocodone.   Metabolizes pain meds quickly (redhead) does better with lower dose meds that can be taken more frequently.     Bleach (sodium hypochlorite)     Levofloxacin     Latex, natural rubber Hives, Itching and Rash    Penicillins Hives and Nausea And Vomiting    Sulfa (sulfonamide antibiotics) Anxiety, Diarrhea, Hives, Itching, Nausea And Vomiting and Palpitations       Objective:     Vital Signs  (Most Recent):  Temp: 97.6 °F (36.4 °C) (09/25/24 0442)  Pulse: 88 (09/25/24 0442)  Resp: 16 (09/25/24 0442)  BP: (!) 93/54 (09/25/24 0442)  SpO2: 98 % (09/25/24 0442) Vital Signs (24h Range):  Temp:  [97.3 °F (36.3 °C)-98 °F (36.7 °C)] 97.6 °F (36.4 °C)  Pulse:  [] 88  Resp:  [10-20] 16  SpO2:  [78 %-99 %] 98 %  BP: ()/(51-61) 93/54     Weight: 63.8 kg (140 lb 10.5 oz)  Body mass index is 24.92 kg/m².  Patient's last menstrual period was 09/14/2024 (exact date).    I&O (Last 24H):    Intake/Output Summary (Last 24 hours) at 9/25/2024 0659  Last data filed at 9/25/2024 0452  Gross per 24 hour   Intake 5306.22 ml   Output 0 ml   Net 5306.22 ml     Physical Exam:  GEN: Mild distress secondary to pain. Alert and oriented. Tearful.   CV: Regular rate and rhythm. Extremities warm and well perfused.   LUNGS: Breathing comfortably on RA, no increased WOB.   ABDOMEN: Soft, non-distended, non-tender, no guarding, no rebound tenderness. No peritoneal signs. Active bowel sounds.  EXT: No erythema, no edema  : declined on admission    Recent Labs   Lab 09/22/24  1558 09/23/24  0521 09/24/24  0943   WBC 13.73* 6.89 9.47   HGB 9.8* 9.9* 9.5*   HCT 30.0* 30.8* 28.5*   MCV 93 93 92    143* 155            Recent Labs   Lab 09/20/24  1122 09/22/24  1839    137   K 4.4 4.1   * 106   CO2 22* 23   BUN 6 7   CREATININE 0.7 0.7   GLU 89 92   PROT  --  6.7   BILITOT  --  0.7   ALKPHOS  --  83   ALT  --  58*   AST  --  68*     Blood Culture: NGTD x2    Imaging Results                  X-Ray Chest AP Portable (Final result)  Result time 09/22/24 17:15:04            Final result by Deni Torrez MD (09/22/24 17:15:04)                           Impression:        No acute abnormality.        Electronically signed by:Deni Torrez  Date:                                            09/22/2024  Time:                                            17:15                     Narrative:     EXAMINATION:  XR CHEST AP  PORTABLE     CLINICAL HISTORY:  Fever, unspecified     TECHNIQUE:  Single frontal view of the chest was performed.     COMPARISON:  None     FINDINGS:  The lungs are clear, with normal appearance of pulmonary vasculature and no pleural effusion or pneumothorax.     The cardiac silhouette is normal in size. The hilar and mediastinal contours are unremarkable.     Bones are intact.                                                CT Abdomen Pelvis With IV Contrast NO Oral Contrast (Final result)  Result time 09/22/24 17:04:21            Final result by Ash Sanchez MD (09/22/24 17:04:21)                           Impression:        Large volume free fluid in the pelvis with enhancing bilateral adnexal tubular structures concerning for tubo-ovarian abscess, pyocele, or hydrosalpinx.  Further evaluation pelvic ultrasound is recommended.  Clinical correlation is recommended.        Electronically signed by:Ash Sanchez MD  Date:                                            09/22/2024  Time:                                            17:04                     Narrative:     EXAMINATION:  CT ABDOMEN PELVIS WITH IV CONTRAST     CLINICAL HISTORY:  Abdominal abscess/infection suspected;     TECHNIQUE:  Axial images of the abdomen and pelvis were acquired  after the use of 75 cc [id number] IV contrast.  Coronal and sagittal reconstructions were also obtained     COMPARISON:  None     FINDINGS:  Heart: Normal in size. No pericardial effusion.     Lungs: Atelectasis of the lower lobes bilaterally.     Liver: Normal in size and contour.  No focal hepatic lesion.     Gallbladder: No calcified gallstones.     Bile Ducts: No evidence of dilated ducts.     Pancreas: No mass or peripancreatic fat stranding.     Spleen: Unremarkable.     Stomach and duodenum: Unremarkable.     Adrenals: Unremarkable.     Kidneys/ Ureters: Normal in size and location. Normal enhancement. No hydronephrosis or nephrolithiasis. No ureteral  dilatation.     Bladder: No evidence of wall thickening.     Reproductive organs: Uterus demonstrates heterogeneous enhancement.  Fluid within the endometrial canal.  1.2 cm enhancing focus in the myometrium likely representing a fibroid.  There is a significant amount of free fluid in the pelvis with bilateral tubular enhancing lesions of the adnexal regions.  Findings are suspicious for tubo-ovarian abscesses or pyosalpinx.     Bowel/Mesentery: Small bowel is normal in caliber with no evidence of obstruction. No evidence of inflammation or wall thickening.  Normal appendix.  Colon demonstrates no focal wall thickening.  Moderate volume of stool throughout the colon.     Lymph nodes: No lymphadenapathy.     Vasculature: No aneurysm. No significant calcific atherosclerosis.     Abdominal wall:  Unremarkable.     Bones: No acute fracture. No suspicious osseous lesions.                 Outside Hospital TVUS 9/22/2024:    The uterus is 9.6 x 5.6 x 9.3 cm in length. Heterogeneous area within the uterus measuring 3.5 x 1.6 x 3.0 cm with mild hyperemia.    The ovaries are not clearly identified. Complex collections within the bilateral adnexa.    There is no abnormal intraperitoneal fluid collection.    CONCLUSION:    Heterogeneous collection within the uterus with mild hyperemia. Findings can represent retained products of conception, blood products or endometrial nidus.    Heterogeneous collections involving the bilateral adnexa. Findings can suggest pelvic inflammatory disease. Abscess is within the differential diagnosis.    EXAMINATION:  US PELVIS COMP WITH TRANSVAG NON-OB (XPD) 9/24/2024     CLINICAL HISTORY:  pelvic pain;     TECHNIQUE:  Transabdominal sonography of the pelvis was performed, followed by transvaginal sonography to better evaluate the uterus and ovaries.     COMPARISON:  CT abdomen and pelvis 09/22/2024     FINDINGS:  The uterus measures approximately 9.7 x 5.3 x 6.5 cm.  Measured leiomyoma measure  approximately 1.4 x 1.3 x 1.5 cm and 1.8 x 1.6 x 1.8 cm.  There is fluid in the cervical canal, mild.  The endometrial stripe measures 0.4 cm.  No abnormal endometrial hyperemia.     The right ovary measures approximately 2.9 x 3.5 x 2.7 cm.  There are dominant follicles or small cysts within the right ovary.  The left ovary measures approximately 3.5 x 2.3 x 3.9 cm.  There are dominant follicles or small cyst within the left ovary.  Arterial and venous flow is documented to the ovaries bilaterally.  There is a small amount of complex fluid in the pelvis.     In comparison to the previous examination, serpiginous tubular structures are again noted within the adnexa bilaterally.  Findings suggest hydrosalpinx.     Impression:     Tubular structures within the right and left adnexa, likely reflect that of dilated fallopian tubes in the settle of hydrosalpinx.  There is complex fluid in the pelvis noting a few of the dilated tubular structures also have complex fluid.  Pyosalpinx is not excluded in this setting.  Correlation and follow-up is advised.     Complex fluid within the pelvis, component of PID remains a concern.  No discrete abscess at this time.      Assessment/Plan:     Wanda Singleton is a 44 y.o.  who presents with sepsis 2/2 bilateral TOA in setting of stage IV endometriosis. She is POD6 hysteroscope D&C. She remains hemodynamically stable without an acute abdomen. Admitting for IV abx, pain control, and serial abdominal exams.     Sepsis 2/2 bilateral TOA   - On presentation to OSH; reported fever 101.2 at home however afebrile at OSH; WBC 17, lactate WNL. TVUS with concern for bilateral TOA however ovaries not well visualized. Received IV gent/vanc.   - On transfer to [facility], presented afebrile, normal HR and normotensive (MAPs 70s).   - Exam; abdomen tender most in LLQ. Patient declined a pelvic exam.   - Labs: WBC 13. Lactate, lipase WNL. Blood and urine cultures pending.   -  CT AP as above; notable free fluid in abdomen and concern for bilateral TOAs.  - On admission, discussed recommendation of conservative management of 24-48h IV antibiotics and possible surgical management if signs of ruptured TOA. Imaging with free fluid in pelvis, however patient has remained hemodynamically stable without signs of acute abdomen. In setting of known endometriosis patient is high risk for intra operative injury and conversion to laparotomy. Patient ultimately desires hysterectomy however she understands importance of initial treatment with IV antibiotics.   - WBC downtrended (13 > 6)  - s/p 48 hours IV Rocephin/doxycycline/Flagyl  - Transition antibiotics to PO today  - Transitioned from IV to PO pain medications yesterday   - gabapentin 300 mg TID   - ibuprofen 600 mg q6h   - Norco 5/10 q4h PRN   - IV Dilaudid PRN breakthrough pain - recommend weaning off IV pain medications as tolerated to progress toward discharge goals  - TVUS repeated yesterday with results as above  - Blood cultures NGTD x2  - UA did not reflex to culture as WBCs <10 per inpatient lab  - Reg diet  - Serial abdominal exams  - Encouraged ambulation    2. Constipation  - scheduled Colace BID, mag oxide QHS, simethicone TID  - s/p Dulcolax suppository x2, patient desires another this AM  - s/p brown bomb enema, only able to tolerate small portion    3. Nausea  - Continue Zofran/Reglan/Pepcid  - Advance diet as tolerated    4. Post operative care; POD#6 hysteroscope DC (9/20)  - Operative findings below; performed by Dr Dang for AUB   - Final path below  - Patient reports she did well immediately post op; no vaginal complaints. Declined pelvic exam so unable to fully assess.      Findings:   Normal external female genitalia. Narrow vagina, no descensus of uterus with gentle traction on the tenaculum. Normal cervix with small atrophic external os. Only able to sound to 3 cm initially. 3 mm diagnostic scope inserted and polypoid  mass vs fibroid visualized extending into the cervix, obstructing internal os. Operative scope inserted and Myosure reach used to remove tissue. Operative scope then able to be inserted into endometrial cavity. Normal bilateral ostia. Fluffy endometrium seen diffusely. Uterus sounded to 8 cm. Sharp curettage performed. Specimens obtained and sent separately to pathology.  Hemostasis obtained at the end of the procedure, fluid deficit 170 cc.    Pathology:  1. Fragments of a benign endometrial polyp   2. Fragments of proliferative endometrium with areas of tubal metaplasia and fragments of benign endocervical mucosa, no atypical hyperplasia or malignancy identified     Dione Silvestre MD  Obstetrics & Gynecology  [facility] ([address])

## 2024-09-26 VITALS
OXYGEN SATURATION: 99 % | DIASTOLIC BLOOD PRESSURE: 67 MMHG | TEMPERATURE: 97 F | BODY MASS INDEX: 24.92 KG/M2 | SYSTOLIC BLOOD PRESSURE: 117 MMHG | RESPIRATION RATE: 17 BRPM | WEIGHT: 140.63 LBS | HEART RATE: 95 BPM | HEIGHT: 63 IN

## 2024-09-26 PROBLEM — N70.93 TOA (TUBO-OVARIAN ABSCESS): Status: ACTIVE | Noted: 2024-09-26

## 2024-09-26 PROCEDURE — S4991 NICOTINE PATCH NONLEGEND: HCPCS | Performed by: OBSTETRICS & GYNECOLOGY

## 2024-09-26 PROCEDURE — 25000003 PHARM REV CODE 250

## 2024-09-26 PROCEDURE — 99239 HOSP IP/OBS DSCHRG MGMT >30: CPT | Mod: ,,, | Performed by: OBSTETRICS & GYNECOLOGY

## 2024-09-26 PROCEDURE — 25000003 PHARM REV CODE 250: Performed by: OBSTETRICS & GYNECOLOGY

## 2024-09-26 RX ADMIN — HYDROCODONE BITARTRATE AND ACETAMINOPHEN 1 TABLET: 10; 325 TABLET ORAL at 04:09

## 2024-09-26 RX ADMIN — MUPIROCIN: 20 OINTMENT TOPICAL at 09:09

## 2024-09-26 RX ADMIN — FAMOTIDINE 40 MG: 20 TABLET ORAL at 09:09

## 2024-09-26 RX ADMIN — IBUPROFEN 600 MG: 600 TABLET, FILM COATED ORAL at 04:09

## 2024-09-26 RX ADMIN — NICOTINE 1 PATCH: 14 PATCH TRANSDERMAL at 09:09

## 2024-09-26 RX ADMIN — HYDROCODONE BITARTRATE AND ACETAMINOPHEN 1 TABLET: 10; 325 TABLET ORAL at 12:09

## 2024-09-26 RX ADMIN — DOCUSATE SODIUM 100 MG: 100 CAPSULE, LIQUID FILLED ORAL at 09:09

## 2024-09-26 RX ADMIN — DOXYCYCLINE HYCLATE 100 MG: 100 TABLET, COATED ORAL at 09:09

## 2024-09-26 RX ADMIN — METRONIDAZOLE 500 MG: 250 TABLET ORAL at 09:09

## 2024-09-26 RX ADMIN — METOCLOPRAMIDE 10 MG: 10 TABLET ORAL at 04:09

## 2024-09-26 RX ADMIN — IBUPROFEN 600 MG: 600 TABLET, FILM COATED ORAL at 12:09

## 2024-09-26 RX ADMIN — SIMETHICONE 80 MG: 80 TABLET, CHEWABLE ORAL at 09:09

## 2024-09-26 NOTE — DISCHARGE SUMMARY
"[facility] ([address])  Obstetrics & Gynecology  Discharge Summary    Patient Name: Wanda Singleton  MRN: [id number]  Admission Date: 2024  Hospital Length of Stay: 4 days  Discharge Date and Time:  2024  Attending Physician: Kristin Vilchis DO   Discharging Provider: Dione Silvestre MD  Primary Care Provider: Jas Gusman (Inactive)    HPI: Patient is a 44 y.o.  POD#2 hysteroscope DC (AUB) transferred from [place] for sepsis 2/2 TOA.       Patient reports she underwent hysteroscope DC on  was discharged same day. Per operative note, removal of endocervical polyp vs fibroid and then endometrium polypoid in appearance. Final path pending. Uncomplicated. She went home and took Tylenol, Ibuprofen and Norco for pain control. She denies any nausea or vomiting however reports decreased appetite. Normal urination. Has not had a BM in 1 week however this is her baseline. Has not passed gas since prior to surgery. Reports fever 101.2 at home. Reports sudden onset abdominal and back pain that woke her in her sleep  morning. Presented to [facility] ER. They transferred her to [facility] due to suspected sepsis (WBC 17, fever) secondary to suspected bilateral TOA seen on TVUS. She received IV gent and IV vanc.      On arrival to [facility], she received IV Dilaudid 1mg and IV Toradol 30mg with minimal improvement in pain. She reports h/o stage IV endometriosis diagnosed by laparoscopy. Desires a hysterectomy. She is frustrated with her pain and "wants everything out." She reports she metabolizes narcotics very quickly and has difficulty with pain control.      PMH: endometriosis stage IV, basal cell carcinoma (denies melanoma)  PSH: hys DC, laparoscopy x2     OB: denies     Hospital Course: Patient was started on IV antibiotics (ceftriaxone, doxycycline, and metronidazole) for treatment of suspected bilateral TOAs as identified on CT imaging. IV " antibiotics were continued for 48 hours with improvement in clinical presentation. She was started on a bowel regimen including Colace BID, magnesium oxide nightly, and Dulcolax suppositories daily as needed for constipation. An enema was attempted, but patient was unable to tolerate. She had subsequent improvement in symptoms and bowel function. She remained inpatient until patient was able to tolerate PO intake without nausea/vomiting, ambulate, void, and have bowel movements without difficulty, and pain was controlled on oral medications. She has remained afebrile since time of presentation. Patient is meeting all discharge milestones and is stable for discharge home at this time with instructions to follow up with primary OBGYN Dr. Dang on 10/4/2024 as listed below. She was instructed to continue oral antibiotics doxycycline and metronidazole for duration of 14 days. She was discharged home on her in-hospital bowel regimen, nausea regimen, and pain medication. ED return precautions given.    Physical Exam:  GEN: Resting comfortably in bed. Alert and oriented.   CV: Regular rate and rhythm. Extremities warm and well perfused.   LUNGS: Breathing comfortably on RA, no increased WOB.   ABDOMEN: Soft, non-distended, non-tender, no guarding, no rebound tenderness. No peritoneal signs. Active bowel sounds. Mild gravity-dependent edema to left lower back.  EXT: No erythema, mild non-pitting edema to bilateral feet  : declined on admission    * No surgery found *     Consults:   Consults (From admission, onward)          Status Ordering Provider     Inpatient consult to Gynecology  Once        Provider:  (Not yet assigned)    Completed GERALD JOY          Significant Diagnostic Studies: Labs: CBC   Recent Labs   Lab 09/24/24  0943   WBC 9.47   HGB 9.5*   HCT 28.5*        Radiology: CT scan: CT ABDOMEN PELVIS WITH CONTRAST:   Results for orders placed or performed during the hospital encounter of  09/22/24   CT Abdomen Pelvis With IV Contrast NO Oral Contrast    Narrative    EXAMINATION:  CT ABDOMEN PELVIS WITH IV CONTRAST    CLINICAL HISTORY:  Abdominal abscess/infection suspected;    TECHNIQUE:  Axial images of the abdomen and pelvis were acquired  after the use of 75 cc [id number] IV contrast.  Coronal and sagittal reconstructions were also obtained    COMPARISON:  None    FINDINGS:  Heart: Normal in size. No pericardial effusion.    Lungs: Atelectasis of the lower lobes bilaterally.    Liver: Normal in size and contour.  No focal hepatic lesion.    Gallbladder: No calcified gallstones.    Bile Ducts: No evidence of dilated ducts.    Pancreas: No mass or peripancreatic fat stranding.    Spleen: Unremarkable.    Stomach and duodenum: Unremarkable.    Adrenals: Unremarkable.    Kidneys/ Ureters: Normal in size and location. Normal enhancement. No hydronephrosis or nephrolithiasis. No ureteral dilatation.    Bladder: No evidence of wall thickening.    Reproductive organs: Uterus demonstrates heterogeneous enhancement.  Fluid within the endometrial canal.  1.2 cm enhancing focus in the myometrium likely representing a fibroid.  There is a significant amount of free fluid in the pelvis with bilateral tubular enhancing lesions of the adnexal regions.  Findings are suspicious for tubo-ovarian abscesses or pyosalpinx.    Bowel/Mesentery: Small bowel is normal in caliber with no evidence of obstruction. No evidence of inflammation or wall thickening.  Normal appendix.  Colon demonstrates no focal wall thickening.  Moderate volume of stool throughout the colon.    Lymph nodes: No lymphadenapathy.    Vasculature: No aneurysm. No significant calcific atherosclerosis.    Abdominal wall:  Unremarkable.    Bones: No acute fracture. No suspicious osseous lesions.      Impression    Large volume free fluid in the pelvis with enhancing bilateral adnexal tubular structures concerning for tubo-ovarian abscess, pyocele, or  hydrosalpinx.  Further evaluation pelvic ultrasound is recommended.  Clinical correlation is recommended.      Electronically signed by: Ash Sanchez MD  Date:    09/22/2024  Time:    17:04     EXAMINATION:  US PELVIS COMP WITH TRANSVAG NON-OB (XPD)     CLINICAL HISTORY:  pelvic pain;     TECHNIQUE:  Transabdominal sonography of the pelvis was performed, followed by transvaginal sonography to better evaluate the uterus and ovaries.     COMPARISON:  CT abdomen and pelvis 09/22/2024     FINDINGS:  The uterus measures approximately 9.7 x 5.3 x 6.5 cm.  Measured leiomyoma measure approximately 1.4 x 1.3 x 1.5 cm and 1.8 x 1.6 x 1.8 cm.  There is fluid in the cervical canal, mild.  The endometrial stripe measures 0.4 cm.  No abnormal endometrial hyperemia.     The right ovary measures approximately 2.9 x 3.5 x 2.7 cm.  There are dominant follicles or small cysts within the right ovary.  The left ovary measures approximately 3.5 x 2.3 x 3.9 cm.  There are dominant follicles or small cyst within the left ovary.  Arterial and venous flow is documented to the ovaries bilaterally.  There is a small amount of complex fluid in the pelvis.     In comparison to the previous examination, serpiginous tubular structures are again noted within the adnexa bilaterally.  Findings suggest hydrosalpinx.     Impression:     Tubular structures within the right and left adnexa, likely reflect that of dilated fallopian tubes in the settle of hydrosalpinx.  There is complex fluid in the pelvis noting a few of the dilated tubular structures also have complex fluid.  Pyosalpinx is not excluded in this setting.  Correlation and follow-up is advised.     Complex fluid within the pelvis, component of PID remains a concern.  No discrete abscess at this time.    Pending Diagnostic Studies:       None          Final Active Diagnoses:    Diagnosis Date Noted POA    PRINCIPAL PROBLEM:  TOA (tubo-ovarian abscess) [N70.93] 09/26/2024 Yes      Problems  Resolved During this Admission:        Discharged Condition: good    Disposition: home/self care    Follow Up:   Follow-up Information       Yenny Dang MD Follow up on 10/4/2024.    Specialty: Obstetrics and Gynecology  Contact information:  [address] [address]  [phone]                           Patient Instructions:      Diet Adult Regular     No driving until:   Order Comments: Do not drive until you are no longer taking narcotic pain medications and you feel comfortable stepping on the brake.     Notify your health care provider if you experience any of the following:  temperature >100.4     Notify your health care provider if you experience any of the following:  persistent nausea and vomiting or diarrhea     Notify your health care provider if you experience any of the following:  severe uncontrolled pain     Notify your health care provider if you experience any of the following:  redness, tenderness, or signs of infection (pain, swelling, redness, odor or green/yellow discharge around incision site)     Notify your health care provider if you experience any of the following:  severe persistent headache     Notify your health care provider if you experience any of the following:  persistent dizziness, light-headedness, or visual disturbances     Notify your health care provider if you experience any of the following:  increased confusion or weakness     Notify your health care provider if you experience any of the following:   Order Comments: BLEEDING PRECAUTIONS: Please report to the Emergency Room or contact your physician if you are saturating 1 thick overnight pad per hour for 2 consecutive hours.    CONSTIPATION REMEDIES: Patients are often constipated after surgery or with use of narcotic pain medicine. Remain adequately hydrated by consuming 8 standard water bottles daily. Take a stool softener (Colace or Sennakot) daily, or Mirilax if you prefer. If you have  not had a bowel movement for 3 days after surgery, or are uncomfortable and unable to pass stool, please try one or all of the following measures:  1.  Milk of Magnesia - 30 cc by mouth every 12 hours   2.  Dulcolax suppository - one suppository per rectum every 4-6 hours   3.  Metamucil, Fibercon or other bulk former - use as directed on packaging  4.  Fleet enema     Reason for not Ordering Smoking Cessation Referral     Order Specific Question Answer Comments   Reason for not ordering: Patient refused      Reason for not Prescribing Nicotine Replacement     Order Specific Question Answer Comments   Reason for not Prescribing: Patient refused      Activity as tolerated     Medications:  Reconciled Home Medications:      Medication List        START taking these medications      bisacodyL 10 mg Supp  Commonly known as: DULCOLAX  Place 1 suppository (10 mg total) rectally daily as needed (constipation).     docusate sodium 100 MG capsule  Commonly known as: COLACE  Take 1 capsule (100 mg total) by mouth 2 (two) times daily.     doxycycline 100 MG tablet  Commonly known as: VIBRA-TABS  Take 1 tablet (100 mg total) by mouth every 12 (twelve) hours. for 13 days     famotidine 40 MG tablet  Commonly known as: PEPCID  Take 1 tablet (40 mg total) by mouth once daily.     magnesium oxide 400 mg (241.3 mg magnesium) tablet  Commonly known as: MAG-OX  Take 1 tablet (400 mg total) by mouth nightly as needed (constipation).     metoclopramide HCl 10 MG tablet  Commonly known as: REGLAN  Take 1 tablet (10 mg total) by mouth every 6 (six) hours. for 14 days     metroNIDAZOLE 500 MG tablet  Commonly known as: FLAGYL  Take 1 tablet (500 mg total) by mouth every 12 (twelve) hours. for 13 days     ondansetron 8 MG Tbdl  Commonly known as: ZOFRAN-ODT  Take 1 tablet (8 mg total) by mouth every 8 (eight) hours as needed (nausea).     simethicone 80 MG chewable tablet  Commonly known as: MYLICON  Take 1 tablet (80 mg total) by mouth 3  (three) times daily.            CHANGE how you take these medications      ibuprofen 600 MG tablet  Commonly known as: ADVIL,MOTRIN  Take 1 tablet (600 mg total) by mouth every 6 (six) hours.  What changed:   when to take this  reasons to take this            CONTINUE taking these medications      acetaminophen 650 MG Tbsr  Commonly known as: TYLENOL  Take 1 tablet (650 mg total) by mouth every 6 (six) hours as needed.     albuterol 90 mcg/actuation inhaler  Commonly known as: PROVENTIL/VENTOLIN HFA  Inhale 2 puffs into the lungs every 6 (six) hours as needed.     ascorbic acid (vitamin C) 500 MG tablet  Commonly known as: VITAMIN C  Take 500 mg by mouth once daily.     clotrimazole-betamethasone 1-0.05% cream  Commonly known as: LOTRISONE  Apply topically 2 (two) times daily.     diphenhydrAMINE 25 mg capsule  Commonly known as: BENADRYL  Take 1 capsule by mouth every evening.     HYDROcodone-acetaminophen 5-325 mg per tablet  Commonly known as: NORCO  Take 1 tablet by mouth every 4 (four) hours as needed for Pain.     lysine 500 mg Tab  Commonly known as: L-LYSINE  Take 500 mg by mouth once daily.     melatonin 5 mg Chew  Take 1 tablet by mouth every evening.     multivitamin per tablet  Commonly known as: THERAGRAN  Take 1 tablet by mouth once daily.     nystatin-triamcinolone cream  Commonly known as: MYCOLOG II  Apply to affected area 2 times daily for 7 days            STOP taking these medications      pseudoephedrine 30 MG tablet  Commonly known as: SUDAFED     STIMULANT LAXATIVE PLUS 8.6-50 mg per tablet  Generic drug: senna-docusate 8.6-50 mg              Dione Silvestre MD  Obstetrics & Gynecology  [facility] ([address])

## 2024-09-26 NOTE — PLAN OF CARE
Case Management Final Discharge Note      Discharge Disposition: Home with family     New DME ordered / company name: none    Relevant SDOH / Transition of Care Barriers:  none    Primary Caretaker and contact information: self    Scheduled followup appointment: Obstetrics and Gynecology     Referrals placed: none    Transportation: Family will transport.        Patient and family educated on discharge services and updated on DC plan. Bedside RN notified, patient clear to discharge from Case Management Perspective.       [facility] ([address])  Discharge Final Note    Primary Care Provider: Jas Gusman (Inactive)    Expected Discharge Date: 9/26/2024    Final Discharge Note (most recent)       Final Note - 09/26/24 1004          Final Note    Assessment Type Final Discharge Note     Anticipated Discharge Disposition Home or Self Care     Hospital Resources/Appts/Education Provided Provided patient/caregiver with written discharge plan information;Appointments scheduled and added to AVS        Post-Acute Status    Discharge Delays None known at this time                     Important Message from Medicare             Contact Info       Yenny Dang MD   Specialty: Obstetrics and Gynecology    [address] [place] [id number]   Phone: [phone]       Next Steps: Follow up on 10/4/2024

## 2024-09-26 NOTE — PROGRESS NOTES
"[facility] ([address])  Obstetrics & Gynecology  Progress Note    Patient Name: Wanda Singleton  MRN: [id number]  Admission Date: 2024  Primary Care Provider: Jas Gusman (Inactive)  Principal Problem: <principal problem not specified>    Subjective:     History of Present Illness:  Patient is a 44 y.o.  POD#2 hysteroscope DC (AUB) transferred from [place] for sepsis  TOA.       Patient reports she underwent hysteroscope DC on  was discharged same day. Per operative note, removal of endocervical polyp vs fibroid and then endometrium polypoid in appearance. Final path pending. Uncomplicated. She went home and took Tylenol, Ibuprofen and Norco for pain control. She denies any nausea or vomiting however reports decreased appetite. Normal urination. Has not had a BM in 1 week however this is her baseline. Has not passed gas since prior to surgery. Reports fever 101.2 at home. Reports sudden onset abdominal and back pain that woke her in her sleep  morning. Presented to [facility] [place] [facility]. They transferred her to [facility] due to suspected sepsis (WBC 17, fever) secondary to suspected bilateral TOA seen on TVUS. She received IV gent and IV vanc.      On arrival to [facility], she received IV Dilaudid 1mg and IV Toradol 30mg with minimal improvement in pain. She reports h/o stage IV endometriosis diagnosed by laparoscopy. Desires a hysterectomy. She is frustrated with her pain and "wants everything out." She reports she metabolizes narcotics very quickly and has difficulty with pain control.      PMH: endometriosis stage IV, basal cell carcinoma (denies melanoma)  PSH: hys DC, laparoscopy x2      OB: denies     Interval History: NAEON. Patient reports concerns of lower extremity swelling and back swelling. She continues to complain of constipation and requests another suppository. She is tolerating PO meals and medications. She is ambulating and voiding " without issue and passing flatus. Last BM yesterday. Wants to discontinue gabapentin as she was researching side effects and realized she felt like she was having shortness of breath related to the gabapentin. No SOB currently. No further complaints at this time    Scheduled Meds:   docusate sodium  100 mg Oral BID    doxycycline  100 mg Oral Q12H    famotidine  40 mg Oral Daily    gabapentin  300 mg Oral TID    ibuprofen  600 mg Oral Q6H    magnesium oxide  400 mg Oral QHS    metoclopramide HCl  10 mg Oral Q6H    metroNIDAZOLE  500 mg Oral Q12H    mupirocin   Nasal BID    nicotine  1 patch Transdermal Daily    simethicone  1 tablet Oral TID     Continuous Infusions:      PRN Meds:  Current Facility-Administered Medications:     bisacodyL, 10 mg, Rectal, Daily PRN    HYDROcodone-acetaminophen, 1 tablet, Oral, Q4H PRN    HYDROcodone-acetaminophen, 1 tablet, Oral, Q4H PRN    ondansetron, 8 mg, Oral, Q8H PRN    sodium chloride 0.9%, 10 mL, Intravenous, PRN    Review of patient's allergies indicates:   Allergen Reactions    Bee's [allergen ext-venom-honey bee] Anaphylaxis    Fluticasone Anxiety, Other (See Comments), Palpitations and Shortness Of Breath    Guaifenesin Anxiety, Hallucinations, Palpitations and Shortness Of Breath    Horseradish root extract Anaphylaxis, Hives, Nausea And Vomiting, Palpitations, Shortness Of Breath and Swelling    Adhesive Hives, Itching and Rash    Oxycodone Hives and Itching     Tolerates Dilaudid, morphine, hydrocodone.   Metabolizes pain meds quickly (redhead) does better with lower dose meds that can be taken more frequently.     Bleach (sodium hypochlorite)     Levofloxacin     Latex, natural rubber Hives, Itching and Rash    Penicillins Hives and Nausea And Vomiting    Sulfa (sulfonamide antibiotics) Anxiety, Diarrhea, Hives, Itching, Nausea And Vomiting and Palpitations       Objective:     Vital Signs (Most Recent):  Temp: 98.2 °F (36.8 °C) (09/26/24 0430)  Pulse: 88 (09/26/24  0430)  Resp: 17 (09/26/24 0430)  BP: (!) 98/51 (09/26/24 0430)  SpO2: 96 % (09/26/24 0430) Vital Signs (24h Range):  Temp:  [97.5 °F (36.4 °C)-98.8 °F (37.1 °C)] 98.2 °F (36.8 °C)  Pulse:  [] 88  Resp:  [12-20] 17  SpO2:  [93 %-99 %] 96 %  BP: ()/(51-61) 98/51     Weight: 63.8 kg (140 lb 10.5 oz)  Body mass index is 24.92 kg/m².  Patient's last menstrual period was 09/14/2024 (exact date).    I&O (Last 24H):    Intake/Output Summary (Last 24 hours) at 9/26/2024 0608  Last data filed at 9/26/2024 0545  Gross per 24 hour   Intake 1680 ml   Output --   Net 1680 ml     Physical Exam:  GEN: Resting comfortably in bed. Alert and oriented.   CV: Regular rate and rhythm. Extremities warm and well perfused.   LUNGS: Breathing comfortably on RA, no increased WOB.   ABDOMEN: Soft, non-distended, non-tender, no guarding, no rebound tenderness. No peritoneal signs. Active bowel sounds. Mild edema to left lower back.  EXT: No erythema, mild non-pitting edema to bilateral feet  : declined on admission    Recent Labs   Lab 09/22/24  1558 09/23/24  0521 09/24/24  0943   WBC 13.73* 6.89 9.47   HGB 9.8* 9.9* 9.5*   HCT 30.0* 30.8* 28.5*   MCV 93 93 92    143* 155            Recent Labs   Lab 09/20/24  1122 09/22/24  1839    137   K 4.4 4.1   * 106   CO2 22* 23   BUN 6 7   CREATININE 0.7 0.7   GLU 89 92   PROT  --  6.7   BILITOT  --  0.7   ALKPHOS  --  83   ALT  --  58*   AST  --  68*     Blood Culture: NGTD x2    Imaging Results                  X-Ray Chest AP Portable (Final result)  Result time 09/22/24 17:15:04            Final result by Deni Torrez MD (09/22/24 17:15:04)                           Impression:        No acute abnormality.        Electronically signed by:Deni Torrez  Date:                                            09/22/2024  Time:                                            17:15                     Narrative:     EXAMINATION:  XR CHEST AP PORTABLE     CLINICAL  HISTORY:  Fever, unspecified     TECHNIQUE:  Single frontal view of the chest was performed.     COMPARISON:  None     FINDINGS:  The lungs are clear, with normal appearance of pulmonary vasculature and no pleural effusion or pneumothorax.     The cardiac silhouette is normal in size. The hilar and mediastinal contours are unremarkable.     Bones are intact.                                                CT Abdomen Pelvis With IV Contrast NO Oral Contrast (Final result)  Result time 09/22/24 17:04:21            Final result by Ash Sanchez MD (09/22/24 17:04:21)                           Impression:        Large volume free fluid in the pelvis with enhancing bilateral adnexal tubular structures concerning for tubo-ovarian abscess, pyocele, or hydrosalpinx.  Further evaluation pelvic ultrasound is recommended.  Clinical correlation is recommended.        Electronically signed by:Ash Sanchez MD  Date:                                            09/22/2024  Time:                                            17:04                     Narrative:     EXAMINATION:  CT ABDOMEN PELVIS WITH IV CONTRAST     CLINICAL HISTORY:  Abdominal abscess/infection suspected;     TECHNIQUE:  Axial images of the abdomen and pelvis were acquired  after the use of 75 cc Upec712 IV contrast.  Coronal and sagittal reconstructions were also obtained     COMPARISON:  None     FINDINGS:  Heart: Normal in size. No pericardial effusion.     Lungs: Atelectasis of the lower lobes bilaterally.     Liver: Normal in size and contour.  No focal hepatic lesion.     Gallbladder: No calcified gallstones.     Bile Ducts: No evidence of dilated ducts.     Pancreas: No mass or peripancreatic fat stranding.     Spleen: Unremarkable.     Stomach and duodenum: Unremarkable.     Adrenals: Unremarkable.     Kidneys/ Ureters: Normal in size and location. Normal enhancement. No hydronephrosis or nephrolithiasis. No ureteral dilatation.     Bladder: No  evidence of wall thickening.     Reproductive organs: Uterus demonstrates heterogeneous enhancement.  Fluid within the endometrial canal.  1.2 cm enhancing focus in the myometrium likely representing a fibroid.  There is a significant amount of free fluid in the pelvis with bilateral tubular enhancing lesions of the adnexal regions.  Findings are suspicious for tubo-ovarian abscesses or pyosalpinx.     Bowel/Mesentery: Small bowel is normal in caliber with no evidence of obstruction. No evidence of inflammation or wall thickening.  Normal appendix.  Colon demonstrates no focal wall thickening.  Moderate volume of stool throughout the colon.     Lymph nodes: No lymphadenapathy.     Vasculature: No aneurysm. No significant calcific atherosclerosis.     Abdominal wall:  Unremarkable.     Bones: No acute fracture. No suspicious osseous lesions.                 Outside Hospital TVUS 9/22/2024:    The uterus is 9.6 x 5.6 x 9.3 cm in length. Heterogeneous area within the uterus measuring 3.5 x 1.6 x 3.0 cm with mild hyperemia.    The ovaries are not clearly identified. Complex collections within the bilateral adnexa.    There is no abnormal intraperitoneal fluid collection.    CONCLUSION:    Heterogeneous collection within the uterus with mild hyperemia. Findings can represent retained products of conception, blood products or endometrial nidus.    Heterogeneous collections involving the bilateral adnexa. Findings can suggest pelvic inflammatory disease. Abscess is within the differential diagnosis.    EXAMINATION:  US PELVIS COMP WITH TRANSVAG NON-OB (XPD) 9/24/2024     CLINICAL HISTORY:  pelvic pain;     TECHNIQUE:  Transabdominal sonography of the pelvis was performed, followed by transvaginal sonography to better evaluate the uterus and ovaries.     COMPARISON:  CT abdomen and pelvis 09/22/2024     FINDINGS:  The uterus measures approximately 9.7 x 5.3 x 6.5 cm.  Measured leiomyoma measure approximately 1.4 x 1.3 x 1.5  cm and 1.8 x 1.6 x 1.8 cm.  There is fluid in the cervical canal, mild.  The endometrial stripe measures 0.4 cm.  No abnormal endometrial hyperemia.     The right ovary measures approximately 2.9 x 3.5 x 2.7 cm.  There are dominant follicles or small cysts within the right ovary.  The left ovary measures approximately 3.5 x 2.3 x 3.9 cm.  There are dominant follicles or small cyst within the left ovary.  Arterial and venous flow is documented to the ovaries bilaterally.  There is a small amount of complex fluid in the pelvis.     In comparison to the previous examination, serpiginous tubular structures are again noted within the adnexa bilaterally.  Findings suggest hydrosalpinx.     Impression:     Tubular structures within the right and left adnexa, likely reflect that of dilated fallopian tubes in the settle of hydrosalpinx.  There is complex fluid in the pelvis noting a few of the dilated tubular structures also have complex fluid.  Pyosalpinx is not excluded in this setting.  Correlation and follow-up is advised.     Complex fluid within the pelvis, component of PID remains a concern.  No discrete abscess at this time.      Assessment/Plan:     Wanda Singleton is a 44 y.o.  who presents with sepsis 2/2 bilateral TOA in setting of stage IV endometriosis. She is POD7 hysteroscope D&C. She remains hemodynamically stable without an acute abdomen. Admitting for IV abx, pain control, and serial abdominal exams.     Sepsis 2/2 bilateral TOA   - On presentation to OSH; reported fever 101.2 at home however afebrile at OSH; WBC 17, lactate WNL. TVUS with concern for bilateral TOA however ovaries not well visualized. Received IV gent/vanc.   - On transfer to [facility], presented afebrile, normal HR and normotensive (MAPs 70s).   - Exam; abdomen tender most in LLQ. Patient declined a pelvic exam.   - Labs: WBC 13. Lactate, lipase WNL. Blood and urine cultures pending.   - CT AP as above; notable free  fluid in abdomen and concern for bilateral TOAs.  - On admission, discussed recommendation of conservative management of 24-48h IV antibiotics and possible surgical management if signs of ruptured TOA. Imaging with free fluid in pelvis, however patient has remained hemodynamically stable without signs of acute abdomen. In setting of known endometriosis patient is high risk for intra operative injury and conversion to laparotomy. Patient ultimately desires hysterectomy however she understands importance of initial treatment with IV antibiotics.   - Leukocytosis improved  - s/p 48 hours IV Rocephin/doxycycline/Flagyl, now transitioned to PO doxy/Flagyl  - TVUS repeated in house with results as above  - Blood cultures NGTD x2  - UA did not reflex to culture as WBCs <10 per inpatient lab  - Reg diet  - Encouraged ambulation  - Gabapentin discontinued per patient request and report of side effects    2. Constipation  - scheduled Colace BID, mag oxide QHS, simethicone TID  - s/p Dulcolax suppository x3, requesting another this AM  - s/p brown bomb enema, only able to tolerate small portion    3. Nausea  - Continue Zofran/Reglan/Pepcid  - Advance diet as tolerated    4. Post operative care; POD#6 hysteroscope DC (9/20)  - Operative findings below; performed by Dr Dang for AUB   - Final path below  - Patient reports she did well immediately post op; no vaginal complaints. Declined pelvic exam so unable to fully assess.      Findings:   Normal external female genitalia. Narrow vagina, no descensus of uterus with gentle traction on the tenaculum. Normal cervix with small atrophic external os. Only able to sound to 3 cm initially. 3 mm diagnostic scope inserted and polypoid mass vs fibroid visualized extending into the cervix, obstructing internal os. Operative scope inserted and Myosure reach used to remove tissue. Operative scope then able to be inserted into endometrial cavity. Normal bilateral ostia. Fluffy endometrium  seen diffusely. Uterus sounded to 8 cm. Sharp curettage performed. Specimens obtained and sent separately to pathology.  Hemostasis obtained at the end of the procedure, fluid deficit 170 cc.    Pathology:  1. Fragments of a benign endometrial polyp   2. Fragments of proliferative endometrium with areas of tubal metaplasia and fragments of benign endocervical mucosa, no atypical hyperplasia or malignancy identified     Dione Silvestre MD  Obstetrics & Gynecology  [facility] ([address])

## 2024-09-27 ENCOUNTER — TELEPHONE (OUTPATIENT)
Dept: OBSTETRICS AND GYNECOLOGY | Facility: HOSPITAL | Age: 44
End: 2024-09-27
Payer: COMMERCIAL

## 2024-09-27 ENCOUNTER — TELEPHONE (OUTPATIENT)
Dept: OBSTETRICS AND GYNECOLOGY | Facility: CLINIC | Age: 44
End: 2024-09-27
Payer: COMMERCIAL

## 2024-09-27 RX ORDER — NYSTATIN 100000 [USP'U]/ML
4 SUSPENSION ORAL 4 TIMES DAILY
Qty: 112 ML | Refills: 0 | Status: SHIPPED | OUTPATIENT
Start: 2024-09-27 | End: 2024-10-04

## 2024-09-27 NOTE — TELEPHONE ENCOUNTER
S/w patient and her family member. Patient is feeling bloated and still nauseated, resulting in limited PO intake. She has not been vomiting. She reports her tongue has a white film that is peeling and has a terrible taste in her mouth. Patient states she feels this is a result of the antibiotics, and she wants to stop taking flagyl and only continue doxy since she has not had side effects with doxy in past. Discussed gold standard treatment regimen and explained why it is important to continue both antibiotics for appropriate coverage of most common bacteria. Symptoms seem c/w oral candidiasis, can rx nystatin mouthwash, patient agrees. Advised to report to urgent care or ED for proper evaluation. Patient desires to f/u with Dr. Dang next week. Has clinic apt on Friday, desires virtual visit on Monday, will message clinic staff. Strict ED precautions emphasized.

## 2024-09-27 NOTE — TELEPHONE ENCOUNTER
Spoke with Dr. Vilchis.     Called pt and told her  will be calling her, so to please be on the lookout. Pt verbalized understanding.

## 2024-09-27 NOTE — TELEPHONE ENCOUNTER
"Called pt. Pt complains of several symptoms and was crying while over the phone. She states she is in 10/10 pain and doesn't know what to do.     She states she hasn't been eating bc she is nauseous from a thick white paste on her tongue. She did state she had a yogurt this morning. Pt also complains she feels dehydrated and has not been drinking water because she feels so bloated. Pt states " She entered the hospital as 120lbs and now weighs 140lbs" She complains that she has painful water retention as a side effect from some of her meds.     Pt states she had a good bowel movement last night. She states she has been able to urinate. She denies fever.     I told pt I would call Dr. Vilchis, gyn-on call and get her medical advise for the situation. Pt verbalized understanding.       "

## 2024-09-27 NOTE — TELEPHONE ENCOUNTER
Based off Dr. Garza's note, I reached out to pt in regards to a virtual appt for early nxt week. Asked if pt would be available in the afternoon on 9/30 Monday around 4:30pm. Pt states she is free. I told pt I will wait to confirm w Dr. Dang what she would like to do since she is also scheduled for Friday 10/4. Also, since Dr. Dang is overbooked, I am trying to manipulate the schedule for her. Pt verbalized understanding.    ambulatory

## 2024-09-28 LAB
BACTERIA BLD CULT: NORMAL
BACTERIA BLD CULT: NORMAL

## 2024-09-30 ENCOUNTER — PATIENT MESSAGE (OUTPATIENT)
Dept: OBSTETRICS AND GYNECOLOGY | Facility: CLINIC | Age: 44
End: 2024-09-30
Payer: COMMERCIAL

## 2024-09-30 DIAGNOSIS — B37.31 YEAST VAGINITIS: Primary | ICD-10-CM

## 2024-09-30 RX ORDER — FLUCONAZOLE 150 MG/1
150 TABLET ORAL
Qty: 3 TABLET | Refills: 0 | Status: SHIPPED | OUTPATIENT
Start: 2024-09-30 | End: 2024-10-07

## 2024-10-04 ENCOUNTER — TELEPHONE (OUTPATIENT)
Dept: OBSTETRICS AND GYNECOLOGY | Facility: CLINIC | Age: 44
End: 2024-10-04
Payer: COMMERCIAL

## 2024-10-04 ENCOUNTER — OFFICE VISIT (OUTPATIENT)
Dept: OBSTETRICS AND GYNECOLOGY | Facility: CLINIC | Age: 44
End: 2024-10-04
Payer: COMMERCIAL

## 2024-10-04 VITALS
SYSTOLIC BLOOD PRESSURE: 100 MMHG | DIASTOLIC BLOOD PRESSURE: 68 MMHG | BODY MASS INDEX: 22.22 KG/M2 | WEIGHT: 125.44 LBS

## 2024-10-04 DIAGNOSIS — N80.9 ENDOMETRIOSIS: ICD-10-CM

## 2024-10-04 DIAGNOSIS — N93.9 ABNORMAL UTERINE BLEEDING (AUB): ICD-10-CM

## 2024-10-04 DIAGNOSIS — Z01.818 PREOPERATIVE EXAMINATION: Primary | ICD-10-CM

## 2024-10-04 PROCEDURE — 3044F HG A1C LEVEL LT 7.0%: CPT | Mod: CPTII,S$GLB,, | Performed by: OBSTETRICS & GYNECOLOGY

## 2024-10-04 PROCEDURE — 1159F MED LIST DOCD IN RCRD: CPT | Mod: CPTII,S$GLB,, | Performed by: OBSTETRICS & GYNECOLOGY

## 2024-10-04 PROCEDURE — 3008F BODY MASS INDEX DOCD: CPT | Mod: CPTII,S$GLB,, | Performed by: OBSTETRICS & GYNECOLOGY

## 2024-10-04 PROCEDURE — 3078F DIAST BP <80 MM HG: CPT | Mod: CPTII,S$GLB,, | Performed by: OBSTETRICS & GYNECOLOGY

## 2024-10-04 PROCEDURE — 99999 PR PBB SHADOW E&M-EST. PATIENT-LVL IV: CPT | Mod: PBBFAC,,, | Performed by: OBSTETRICS & GYNECOLOGY

## 2024-10-04 PROCEDURE — 3074F SYST BP LT 130 MM HG: CPT | Mod: CPTII,S$GLB,, | Performed by: OBSTETRICS & GYNECOLOGY

## 2024-10-04 PROCEDURE — 1160F RVW MEDS BY RX/DR IN RCRD: CPT | Mod: CPTII,S$GLB,, | Performed by: OBSTETRICS & GYNECOLOGY

## 2024-10-04 PROCEDURE — 1111F DSCHRG MED/CURRENT MED MERGE: CPT | Mod: CPTII,S$GLB,, | Performed by: OBSTETRICS & GYNECOLOGY

## 2024-10-04 PROCEDURE — 99214 OFFICE O/P EST MOD 30 MIN: CPT | Mod: S$GLB,,, | Performed by: OBSTETRICS & GYNECOLOGY

## 2024-10-04 NOTE — TELEPHONE ENCOUNTER
Spoke with Nupur from pre-admit about seeing if this pt could have her pre-admit appt as a virtual instead of in person (10/14). Sx is 10/18 w Dr. Dang. She lives in [place], [place] and recently had a pre-admit for her sx on 9/20.    Nupur stated as long as she remains not a first case of the day, they can draw her type & screen the morning of the surgery. Dr. Dang is aware in order for this to happen she can't be the first case.     Nupur said she'll confirm with anesthesia, and if they approve, she will notify the patient of the virtual change.

## 2024-10-04 NOTE — PROGRESS NOTES
[facility]  History & Physical  Gynecology    SUBJECTIVE:     Chief Complaint/Reason for Procedure: Endometriosis/CPP/AUB    History of Present Illness:  Patient is a 44 y.o.  with endometriosis and irregular menstrual cycles desiring definitive surgical management with hysterectomy. Recently underwent uncomplicated HSC D&C on  for inability to perform endometrial sampling in the office, complicated by readmission for fever and pelvic pain with imaging findings concerning for possible TOA. She is s/p IV antibiotics and transitioned to PO antibiotics which she is no longer taking.  Fever resolved and pain improved and is feeling well today without complaints or concerns other than after her procedure she got a menstrual cycle which was heavier than normal. Otherwise largely uncomplicated medical history other than chronic HA and ENT/sinus issues. H/o tobacco use.     AUB History:  Notes irregular bleeding that has been present for the past several years. Mostly spotting. In the past 6 months menstrual bleeding has decreased in amount, periods are spacing out, cycles are getting lighter, heavy for 1 day instead of 6, however she still c/o intermenstrual spotting (US done last year was normal with EMS 4 mm, 3-4 cm endometrioma on ROV), reports spotting almost every day requiring the use of a pad, blood is occasionally brownish like old blood, occasionally light pink spotting. Family history of early menopause, however hormone workup normal. Was prescribed Aygestin last year for endometriosis/AUB but did not like the side effects and stopped taking it. Was also prescribed oral Prometrium and did not like side effects, stopped taking it.      PSH: [facility] [place] D&C diagnostic LSC x2, sinus surgeries    D&C path: Fragments of a benign endometrial polyp, fragments of proliferative endometrium with areas of tubal metaplasia and benign endocervical mucosa, no atypical hyperplasia or malignancy identified     EMBx  attempted 24, unable to pass pipelle or os finder past 4 cm.  Pap (2024): NILM/HPV neg  MMG (2024): BIRADS-1   TVUS (2024): Uterus 9x5x5 cm. 1.4 cm x 1 cm oblong hypoechoic mass with internal flow suspicious for endometrial neoplasm in the PEDRO/cervix; multiple fibroids, largest 1.3 cm; EMS 3 mm at the fundus; LOV 4 cm with 2.5 cm simple cyst, 2 cm complex left ovarian cyst with internal echogenic maternal and septation vs two adjacent cysts; ROV not visualized; complex structure in right adnexa suggestive of hydrosalpinx; small volume free fluid in the pelvis    Review of patient's allergies indicates:   Allergen Reactions    Bee's [allergen ext-venom-honey bee] Anaphylaxis    Fluticasone Anxiety, Other (See Comments), Palpitations and Shortness Of Breath    Guaifenesin Anxiety, Hallucinations, Palpitations and Shortness Of Breath    Horseradish root extract Anaphylaxis, Hives, Nausea And Vomiting, Palpitations, Shortness Of Breath and Swelling    Adhesive Hives, Itching and Rash    Fluorouracil-adhesive bandage Rash    Oxycodone Hives and Itching     Tolerates Dilaudid, morphine, hydrocodone.   Metabolizes pain meds quickly (redhead) does better with lower dose meds that can be taken more frequently.     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      Past Surgical History:   Procedure Laterality Date    ETHMOIDECTOMY  2022    Procedure: ETHMOIDECTOMY;  Surgeon: Delvis Galloway,  MD;  [address];  Service: ENT;;    FRONTAL SINUS OBLITERATION  7/21/2022    Procedure: SINUSOTOMY, FRONTAL SINUS, OBLITERATIVE;  Surgeon: Delvis Galloway MD;  Location: [facility] OR [address] [place] [address];  Service: ENT;;    FUNCTIONAL ENDOSCOPIC SINUS SURGERY (FESS) USING COMPUTER-ASSISTED NAVIGATION Bilateral 7/21/2022    Procedure: FESS, USING COMPUTER-ASSISTED NAVIGATION;  Surgeon: Delvis Galloway MD;  Location: [address];  Service: ENT;  Laterality: Bilateral;    HYSTEROSCOPY WITH DILATION AND CURETTAGE OF UTERUS N/A 9/20/2024    Procedure: HYSTEROSCOPY, WITH DILATION AND CURETTAGE OF UTERUS;  Surgeon: Yenny Dang MD;  Location: [facility];  Service: OB/GYN;  Laterality: N/A;    LAPAROSCOPY      x2 for endometriosis    LEG SURGERY      x8    MAXILLARY ANTROSTOMY  7/21/2022    Procedure: MAXILLARY ANTROSTOMY;  Surgeon: Delvis Galloway MD;  Location: [address];  Service: ENT;;     Family History   Problem Relation Name Age of Onset    Breast cancer Neg Hx      Colon cancer Neg Hx      Ovarian cancer Neg Hx       Social History     Tobacco Use    Smoking status: Every Day     Current packs/day: 0.50     Types: Cigarettes    Smokeless tobacco: Never   Substance Use Topics    Alcohol use: Not Currently    Drug use: Not Currently     Outpatient Medications Marked as Taking for the 10/4/24 encounter (Office Visit) with Yenny Dang MD   Medication Sig Dispense Refill    albuterol (PROVENTIL/VENTOLIN HFA) 90 mcg/actuation inhaler Inhale 2 puffs into the lungs every 6 (six) hours as needed.      ascorbic acid, vitamin C, (VITAMIN C) 500 MG tablet Take 500 mg by mouth once daily.      diphenhydrAMINE (BENADRYL) 25 mg capsule Take 1 capsule by mouth every evening.      HYDROcodone-acetaminophen (NORCO) 5-325 mg per tablet Take 1 tablet by mouth every 4 (four) hours as needed for Pain. 30 tablet 0    ibuprofen (ADVIL,MOTRIN) 600 MG tablet Take 1 tablet (600 mg total) by mouth every 6 (six) hours. 60  tablet 1    multivitamin (THERAGRAN) per tablet Take 1 tablet by mouth once daily.      ondansetron (ZOFRAN-ODT) 8 MG TbDL Take 1 tablet (8 mg total) by mouth every 8 (eight) hours as needed (nausea). 30 tablet 1       Review of Systems:  Constitutional: no fever or chills  Respiratory: no cough or shortness of breath  Cardiovascular: no chest pain or palpitations  Gastrointestinal: no nausea or vomiting, tolerating diet  Genitourinary: no hematuria or dysuria  Musculoskeletal: no arthralgias or myalgias  Neurological: no seizures or tremors  Behavioral/Psych: no auditory or visual hallucinations     OBJECTIVE:     Vital Signs (Most Recent):  BP: 100/68 (10/04/24 1259)    Physical Exam:  General: well developed, well nourished  Neck: thyroid not enlarged, symmetric, no tenderness/mass/nodules  Lungs:  clear to auscultation bilaterally and normal respiratory effort  Cardiovascular: Heart: regular rate and rhythm, S1, S2 normal, no murmur, click, rub or gallop. Chest Wall: no tenderness. Extremities: no cyanosis or edema, or clubbing. Pulses: 2+ and symmetric  not examined.  Abdomen/Rectal: Abdomen: soft, non-tender non-distented; bowel sounds normal; no masses,  no organomegaly. Rectal: not examined  Pelvic: external genitalia normal, urethra without abnormality or discharge, narrow introitus, vagina normal without discharge, cervix atrophic, no cervical motion tenderness, uterus 8-10 week size with limited mobility and descent, would be a difficult TVH candidate, no adnexal masses or tenderness    Skin: Skin color, texture, turgor normal. No rashes or lesions  Musculoskeletal:no clubbing, cyanosis  Neurologic: Normal strength and tone. No focal numbness or weakness  Psych/Behavioral:  Alert and oriented, appropriate affect.    Laboratory:  Lab Results   Component Value Date    WBC 9.47 09/24/2024    HGB 9.5 (L) 09/24/2024    HCT 28.5 (L) 09/24/2024    MCV 92 09/24/2024     09/24/2024     CMP  Sodium   Date  Value Ref Range Status   2024 135 (L) 136 - 145 mmol/L Final     Potassium   Date Value Ref Range Status   2024 3.6 3.5 - 5.1 mmol/L Final     Chloride   Date Value Ref Range Status   2024 101 95 - 110 mmol/L Final     CO2   Date Value Ref Range Status   2024 25 23 - 29 mmol/L Final     Glucose   Date Value Ref Range Status   2024 87 70 - 110 mg/dL Final     BUN   Date Value Ref Range Status   2024 6 6 - 20 mg/dL Final     Creatinine   Date Value Ref Range Status   2024 0.7 0.5 - 1.4 mg/dL Final     Calcium   Date Value Ref Range Status   2024 8.9 8.7 - 10.5 mg/dL Final     Total Protein   Date Value Ref Range Status   2024 6.7 6.0 - 8.4 g/dL Final     Albumin   Date Value Ref Range Status   2024 3.7 3.5 - 5.2 g/dL Final     Total Bilirubin   Date Value Ref Range Status   2024 0.7 0.1 - 1.0 mg/dL Final     Comment:     For infants and newborns, interpretation of results should be based  on gestational age, weight and in agreement with clinical  observations.    Premature Infant recommended reference ranges:  Up to 24 hours.............<8.0 mg/dL  Up to 48 hours............<12.0 mg/dL  3-5 days..................<15.0 mg/dL  6-29 days.................<15.0 mg/dL       Alkaline Phosphatase   Date Value Ref Range Status   2024 83 55 - 135 U/L Final     AST   Date Value Ref Range Status   2024 68 (H) 10 - 40 U/L Final     ALT   Date Value Ref Range Status   2024 58 (H) 10 - 44 U/L Final     Anion Gap   Date Value Ref Range Status   2024 9 8 - 16 mmol/L Final     eGFR if    Date Value Ref Range Status   10/26/2021 >60.0 >60 mL/min/1.73 m^2 Final     eGFR if non    Date Value Ref Range Status   10/26/2021 >60.0 >60 mL/min/1.73 m^2 Final     Comment:     Calculation used to obtain the estimated glomerular filtration  rate (eGFR) is the CKD-EPI equation.          ASSESSMENT/PLAN:   44 y.o.  with  endometriosis and irregular menstrual cycles desiring definitive surgical management with hysterectomy.    Patient counseled on risks of surgery including but not limited to pain, bleeding, infection, damage to surrounding pelvic or abdominal structures such as bowel or bladder, damage to pelvic/abdominal nerves and vasculature, anesthesia complications, and death. Counseled on planned laparoscopic nature of procedure with possible conversion to open procedure in the event of hemorrhage or dense adhesive disease. Patient counseled on intended ovarian-sparing procedure, but that ovaries may have to be removed if grossly abnormal, affected by dense adhesions and/or incidentally damaged during surgery. She was counseled on the effects and implications of surgical menopause including recommendations for HRT. Patient consents to a blood transfusion if one becomes medically necessary. Consents signed and all questions answered to the patient's apparent satisfaction. To OR 10/18/24 for TLH/BS, and other necessary interventions.     CBC, UPT and T&S to be ordered preop.    RTC 2 weeks and 6 weeks for postop visit.       Yenny Dang MD  Department of Obstetrics & Gynecology  [facility]

## 2024-10-04 NOTE — H&P (VIEW-ONLY)
[facility]  History & Physical  Gynecology    SUBJECTIVE:     Chief Complaint/Reason for Procedure: Endometriosis/CPP/AUB    History of Present Illness:  Patient is a 44 y.o.  with endometriosis and irregular menstrual cycles desiring definitive surgical management with hysterectomy. Recently underwent uncomplicated HSC D&C on  for inability to perform endometrial sampling in the office, complicated by readmission for fever and pelvic pain with imaging findings concerning for possible TOA. She is s/p IV antibiotics and transitioned to PO antibiotics which she is no longer taking.  Fever resolved and pain improved and is feeling well today without complaints or concerns other than after her procedure she got a menstrual cycle which was heavier than normal. Otherwise largely uncomplicated medical history other than chronic HA and ENT/sinus issues. H/o tobacco use.     AUB History:  Notes irregular bleeding that has been present for the past several years. Mostly spotting. In the past 6 months menstrual bleeding has decreased in amount, periods are spacing out, cycles are getting lighter, heavy for 1 day instead of 6, however she still c/o intermenstrual spotting (US done last year was normal with EMS 4 mm, 3-4 cm endometrioma on ROV), reports spotting almost every day requiring the use of a pad, blood is occasionally brownish like old blood, occasionally light pink spotting. Family history of early menopause, however hormone workup normal. Was prescribed Aygestin last year for endometriosis/AUB but did not like the side effects and stopped taking it. Was also prescribed oral Prometrium and did not like side effects, stopped taking it.      PSH: [facility] [place] D&C diagnostic LSC x2, sinus surgeries    D&C path: Fragments of a benign endometrial polyp, fragments of proliferative endometrium with areas of tubal metaplasia and benign endocervical mucosa, no atypical hyperplasia or malignancy identified     EMBx  attempted 24, unable to pass pipelle or os finder past 4 cm.  Pap (2024): NILM/HPV neg  MMG (2024): BIRADS-1   TVUS (2024): Uterus 9x5x5 cm. 1.4 cm x 1 cm oblong hypoechoic mass with internal flow suspicious for endometrial neoplasm in the PEDRO/cervix; multiple fibroids, largest 1.3 cm; EMS 3 mm at the fundus; LOV 4 cm with 2.5 cm simple cyst, 2 cm complex left ovarian cyst with internal echogenic maternal and septation vs two adjacent cysts; ROV not visualized; complex structure in right adnexa suggestive of hydrosalpinx; small volume free fluid in the pelvis    Review of patient's allergies indicates:   Allergen Reactions    Bee's [allergen ext-venom-honey bee] Anaphylaxis    Fluticasone Anxiety, Other (See Comments), Palpitations and Shortness Of Breath    Guaifenesin Anxiety, Hallucinations, Palpitations and Shortness Of Breath    Horseradish root extract Anaphylaxis, Hives, Nausea And Vomiting, Palpitations, Shortness Of Breath and Swelling    Adhesive Hives, Itching and Rash    Fluorouracil-adhesive bandage Rash    Oxycodone Hives and Itching     Tolerates Dilaudid, morphine, hydrocodone.   Metabolizes pain meds quickly (redhead) does better with lower dose meds that can be taken more frequently.     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      Past Surgical History:   Procedure Laterality Date    ETHMOIDECTOMY  2022    Procedure: ETHMOIDECTOMY;  Surgeon: Delvis Galloway,  MD;  [address];  Service: ENT;;    FRONTAL SINUS OBLITERATION  7/21/2022    Procedure: SINUSOTOMY, FRONTAL SINUS, OBLITERATIVE;  Surgeon: Delvis Galloway MD;  Location: [facility] OR [address] [place] [address];  Service: ENT;;    FUNCTIONAL ENDOSCOPIC SINUS SURGERY (FESS) USING COMPUTER-ASSISTED NAVIGATION Bilateral 7/21/2022    Procedure: FESS, USING COMPUTER-ASSISTED NAVIGATION;  Surgeon: Delvis Galloway MD;  Location: [address];  Service: ENT;  Laterality: Bilateral;    HYSTEROSCOPY WITH DILATION AND CURETTAGE OF UTERUS N/A 9/20/2024    Procedure: HYSTEROSCOPY, WITH DILATION AND CURETTAGE OF UTERUS;  Surgeon: Yenny Dang MD;  Location: [facility];  Service: OB/GYN;  Laterality: N/A;    LAPAROSCOPY      x2 for endometriosis    LEG SURGERY      x8    MAXILLARY ANTROSTOMY  7/21/2022    Procedure: MAXILLARY ANTROSTOMY;  Surgeon: Delvis Galloway MD;  Location: [address];  Service: ENT;;     Family History   Problem Relation Name Age of Onset    Breast cancer Neg Hx      Colon cancer Neg Hx      Ovarian cancer Neg Hx       Social History     Tobacco Use    Smoking status: Every Day     Current packs/day: 0.50     Types: Cigarettes    Smokeless tobacco: Never   Substance Use Topics    Alcohol use: Not Currently    Drug use: Not Currently     Outpatient Medications Marked as Taking for the 10/4/24 encounter (Office Visit) with Yenny Dang MD   Medication Sig Dispense Refill    albuterol (PROVENTIL/VENTOLIN HFA) 90 mcg/actuation inhaler Inhale 2 puffs into the lungs every 6 (six) hours as needed.      ascorbic acid, vitamin C, (VITAMIN C) 500 MG tablet Take 500 mg by mouth once daily.      diphenhydrAMINE (BENADRYL) 25 mg capsule Take 1 capsule by mouth every evening.      HYDROcodone-acetaminophen (NORCO) 5-325 mg per tablet Take 1 tablet by mouth every 4 (four) hours as needed for Pain. 30 tablet 0    ibuprofen (ADVIL,MOTRIN) 600 MG tablet Take 1 tablet (600 mg total) by mouth every 6 (six) hours. 60  tablet 1    multivitamin (THERAGRAN) per tablet Take 1 tablet by mouth once daily.      ondansetron (ZOFRAN-ODT) 8 MG TbDL Take 1 tablet (8 mg total) by mouth every 8 (eight) hours as needed (nausea). 30 tablet 1       Review of Systems:  Constitutional: no fever or chills  Respiratory: no cough or shortness of breath  Cardiovascular: no chest pain or palpitations  Gastrointestinal: no nausea or vomiting, tolerating diet  Genitourinary: no hematuria or dysuria  Musculoskeletal: no arthralgias or myalgias  Neurological: no seizures or tremors  Behavioral/Psych: no auditory or visual hallucinations     OBJECTIVE:     Vital Signs (Most Recent):  BP: 100/68 (10/04/24 1259)    Physical Exam:  General: well developed, well nourished  Neck: thyroid not enlarged, symmetric, no tenderness/mass/nodules  Lungs:  clear to auscultation bilaterally and normal respiratory effort  Cardiovascular: Heart: regular rate and rhythm, S1, S2 normal, no murmur, click, rub or gallop. Chest Wall: no tenderness. Extremities: no cyanosis or edema, or clubbing. Pulses: 2+ and symmetric  not examined.  Abdomen/Rectal: Abdomen: soft, non-tender non-distented; bowel sounds normal; no masses,  no organomegaly. Rectal: not examined  Pelvic: external genitalia normal, urethra without abnormality or discharge, narrow introitus, vagina normal without discharge, cervix atrophic, no cervical motion tenderness, uterus 8-10 week size with limited mobility and descent, would be a difficult TVH candidate, no adnexal masses or tenderness    Skin: Skin color, texture, turgor normal. No rashes or lesions  Musculoskeletal:no clubbing, cyanosis  Neurologic: Normal strength and tone. No focal numbness or weakness  Psych/Behavioral:  Alert and oriented, appropriate affect.    Laboratory:  Lab Results   Component Value Date    WBC 9.47 09/24/2024    HGB 9.5 (L) 09/24/2024    HCT 28.5 (L) 09/24/2024    MCV 92 09/24/2024     09/24/2024     CMP  Sodium   Date  Value Ref Range Status   2024 135 (L) 136 - 145 mmol/L Final     Potassium   Date Value Ref Range Status   2024 3.6 3.5 - 5.1 mmol/L Final     Chloride   Date Value Ref Range Status   2024 101 95 - 110 mmol/L Final     CO2   Date Value Ref Range Status   2024 25 23 - 29 mmol/L Final     Glucose   Date Value Ref Range Status   2024 87 70 - 110 mg/dL Final     BUN   Date Value Ref Range Status   2024 6 6 - 20 mg/dL Final     Creatinine   Date Value Ref Range Status   2024 0.7 0.5 - 1.4 mg/dL Final     Calcium   Date Value Ref Range Status   2024 8.9 8.7 - 10.5 mg/dL Final     Total Protein   Date Value Ref Range Status   2024 6.7 6.0 - 8.4 g/dL Final     Albumin   Date Value Ref Range Status   2024 3.7 3.5 - 5.2 g/dL Final     Total Bilirubin   Date Value Ref Range Status   2024 0.7 0.1 - 1.0 mg/dL Final     Comment:     For infants and newborns, interpretation of results should be based  on gestational age, weight and in agreement with clinical  observations.    Premature Infant recommended reference ranges:  Up to 24 hours.............<8.0 mg/dL  Up to 48 hours............<12.0 mg/dL  3-5 days..................<15.0 mg/dL  6-29 days.................<15.0 mg/dL       Alkaline Phosphatase   Date Value Ref Range Status   2024 83 55 - 135 U/L Final     AST   Date Value Ref Range Status   2024 68 (H) 10 - 40 U/L Final     ALT   Date Value Ref Range Status   2024 58 (H) 10 - 44 U/L Final     Anion Gap   Date Value Ref Range Status   2024 9 8 - 16 mmol/L Final     eGFR if    Date Value Ref Range Status   10/26/2021 >60.0 >60 mL/min/1.73 m^2 Final     eGFR if non    Date Value Ref Range Status   10/26/2021 >60.0 >60 mL/min/1.73 m^2 Final     Comment:     Calculation used to obtain the estimated glomerular filtration  rate (eGFR) is the CKD-EPI equation.          ASSESSMENT/PLAN:   44 y.o.  with  endometriosis and irregular menstrual cycles desiring definitive surgical management with hysterectomy.    Patient counseled on risks of surgery including but not limited to pain, bleeding, infection, damage to surrounding pelvic or abdominal structures such as bowel or bladder, damage to pelvic/abdominal nerves and vasculature, anesthesia complications, and death. Counseled on planned laparoscopic nature of procedure with possible conversion to open procedure in the event of hemorrhage or dense adhesive disease. Patient counseled on intended ovarian-sparing procedure, but that ovaries may have to be removed if grossly abnormal, affected by dense adhesions and/or incidentally damaged during surgery. She was counseled on the effects and implications of surgical menopause including recommendations for HRT. Patient consents to a blood transfusion if one becomes medically necessary. Consents signed and all questions answered to the patient's apparent satisfaction. To OR 10/18/24 for TLH/BS, and other necessary interventions.     CBC, UPT and T&S to be ordered preop.    RTC 2 weeks and 6 weeks for postop visit.       Yenny Dang MD  Department of Obstetrics & Gynecology  [facility]

## 2024-10-06 RX ORDER — CLINDAMYCIN PHOSPHATE 900 MG/50ML
900 INJECTION, SOLUTION INTRAVENOUS
OUTPATIENT
Start: 2024-10-06

## 2024-10-06 RX ORDER — FAMOTIDINE 20 MG/1
20 TABLET, FILM COATED ORAL
OUTPATIENT
Start: 2024-10-06

## 2024-10-07 ENCOUNTER — TELEPHONE (OUTPATIENT)
Dept: OBSTETRICS AND GYNECOLOGY | Facility: CLINIC | Age: 44
End: 2024-10-07
Payer: COMMERCIAL

## 2024-10-07 NOTE — TELEPHONE ENCOUNTER
Called pt and scheduled 2 post-op appts. Pt is unsure if she can make the 12/6 date bc her  has work but she states she will reach out if she needs to reschedule. The two weeks before 12/2-12/6 , Dr. Dang is not in office

## 2024-10-08 ENCOUNTER — PATIENT MESSAGE (OUTPATIENT)
Dept: OBSTETRICS AND GYNECOLOGY | Facility: CLINIC | Age: 44
End: 2024-10-08
Payer: COMMERCIAL

## 2024-10-09 RX ORDER — HYDROCODONE BITARTRATE AND ACETAMINOPHEN 5; 325 MG/1; MG/1
1 TABLET ORAL EVERY 4 HOURS PRN
Qty: 5 TABLET | Refills: 0 | Status: SHIPPED | OUTPATIENT
Start: 2024-10-09

## 2024-10-10 DIAGNOSIS — G89.29 CHRONIC PELVIC PAIN IN FEMALE: Primary | ICD-10-CM

## 2024-10-10 DIAGNOSIS — R10.2 CHRONIC PELVIC PAIN IN FEMALE: Primary | ICD-10-CM

## 2024-10-16 ENCOUNTER — TELEPHONE (OUTPATIENT)
Dept: OBSTETRICS AND GYNECOLOGY | Facility: CLINIC | Age: 44
End: 2024-10-16
Payer: COMMERCIAL

## 2024-10-17 ENCOUNTER — TELEPHONE (OUTPATIENT)
Dept: OBSTETRICS AND GYNECOLOGY | Facility: CLINIC | Age: 44
End: 2024-10-17
Payer: COMMERCIAL

## 2024-10-17 NOTE — TELEPHONE ENCOUNTER
Called pt and informed to arrive tomorrow, 10/18, at 9:00am for surgery on the 1st floor of the [facility] at [facility]. Informed to refrain from eating any food after 9:00pm tonight. However, clear liquids and sports drinks such as Gatorade are okay. Reiterated 9am arrival time. Pt had no questions and verbalized understanding of all above.

## 2024-10-18 ENCOUNTER — HOSPITAL ENCOUNTER (OUTPATIENT)
Facility: OTHER | Age: 44
Discharge: HOME | End: 2024-10-19
Attending: OBSTETRICS & GYNECOLOGY | Admitting: OBSTETRICS & GYNECOLOGY
Payer: COMMERCIAL

## 2024-10-18 ENCOUNTER — ANESTHESIA (OUTPATIENT)
Dept: SURGERY | Facility: OTHER | Age: 44
End: 2024-10-18
Payer: COMMERCIAL

## 2024-10-18 DIAGNOSIS — N93.9 ABNORMAL UTERINE BLEEDING (AUB): ICD-10-CM

## 2024-10-18 DIAGNOSIS — Z01.818 PREOPERATIVE EXAMINATION: ICD-10-CM

## 2024-10-18 DIAGNOSIS — Z01.818 PREOP TESTING: ICD-10-CM

## 2024-10-18 DIAGNOSIS — N80.9 ENDOMETRIOSIS: ICD-10-CM

## 2024-10-18 DIAGNOSIS — Z90.710 STATUS POST HYSTERECTOMY: Primary | ICD-10-CM

## 2024-10-18 LAB
ABO + RH BLD: NORMAL
ANION GAP SERPL CALC-SCNC: 8 MMOL/L (ref 8–16)
B-HCG UR QL: NEGATIVE
B-HCG UR QL: NEGATIVE
BASOPHILS # BLD AUTO: 0.04 K/UL (ref 0–0.2)
BASOPHILS NFR BLD: 0.6 % (ref 0–1.9)
BLD GP AB SCN CELLS X3 SERPL QL: NORMAL
BUN SERPL-MCNC: 10 MG/DL (ref 6–20)
CALCIUM SERPL-MCNC: 9.4 MG/DL (ref 8.7–10.5)
CHLORIDE SERPL-SCNC: 108 MMOL/L (ref 95–110)
CO2 SERPL-SCNC: 23 MMOL/L (ref 23–29)
CREAT SERPL-MCNC: 0.7 MG/DL (ref 0.5–1.4)
CTP QC/QA: YES
CTP QC/QA: YES
DIFFERENTIAL METHOD BLD: ABNORMAL
EOSINOPHIL # BLD AUTO: 0.2 K/UL (ref 0–0.5)
EOSINOPHIL NFR BLD: 2.8 % (ref 0–8)
ERYTHROCYTE [DISTWIDTH] IN BLOOD BY AUTOMATED COUNT: 13.5 % (ref 11.5–14.5)
EST. GFR  (NO RACE VARIABLE): >60 ML/MIN/1.73 M^2
GLUCOSE SERPL-MCNC: 82 MG/DL (ref 70–110)
HCT VFR BLD AUTO: 31.8 % (ref 37–48.5)
HGB BLD-MCNC: 10.1 G/DL (ref 12–16)
IMM GRANULOCYTES # BLD AUTO: 0.02 K/UL (ref 0–0.04)
IMM GRANULOCYTES NFR BLD AUTO: 0.3 % (ref 0–0.5)
LYMPHOCYTES # BLD AUTO: 2.3 K/UL (ref 1–4.8)
LYMPHOCYTES NFR BLD: 36.1 % (ref 18–48)
MCH RBC QN AUTO: 28.9 PG (ref 27–31)
MCHC RBC AUTO-ENTMCNC: 31.8 G/DL (ref 32–36)
MCV RBC AUTO: 91 FL (ref 82–98)
MONOCYTES # BLD AUTO: 0.5 K/UL (ref 0.3–1)
MONOCYTES NFR BLD: 8.4 % (ref 4–15)
NEUTROPHILS # BLD AUTO: 3.3 K/UL (ref 1.8–7.7)
NEUTROPHILS NFR BLD: 51.8 % (ref 38–73)
NRBC BLD-RTO: 0 /100 WBC
PLATELET # BLD AUTO: 440 K/UL (ref 150–450)
PMV BLD AUTO: 10.2 FL (ref 9.2–12.9)
POCT GLUCOSE: 92 MG/DL (ref 70–110)
POTASSIUM SERPL-SCNC: 4.4 MMOL/L (ref 3.5–5.1)
RBC # BLD AUTO: 3.5 M/UL (ref 4–5.4)
SODIUM SERPL-SCNC: 139 MMOL/L (ref 136–145)
SPECIMEN OUTDATE: NORMAL
WBC # BLD AUTO: 6.34 K/UL (ref 3.9–12.7)

## 2024-10-18 PROCEDURE — 63600175 PHARM REV CODE 636 W HCPCS: Performed by: STUDENT IN AN ORGANIZED HEALTH CARE EDUCATION/TRAINING PROGRAM

## 2024-10-18 PROCEDURE — 88302 TISSUE EXAM BY PATHOLOGIST: CPT | Performed by: PATHOLOGY

## 2024-10-18 PROCEDURE — 25000003 PHARM REV CODE 250: Performed by: OBSTETRICS & GYNECOLOGY

## 2024-10-18 PROCEDURE — 88307 TISSUE EXAM BY PATHOLOGIST: CPT | Performed by: PATHOLOGY

## 2024-10-18 PROCEDURE — 63600175 PHARM REV CODE 636 W HCPCS: Performed by: ANESTHESIOLOGY

## 2024-10-18 PROCEDURE — 86850 RBC ANTIBODY SCREEN: CPT | Performed by: OBSTETRICS & GYNECOLOGY

## 2024-10-18 PROCEDURE — 82962 GLUCOSE BLOOD TEST: CPT | Performed by: OBSTETRICS & GYNECOLOGY

## 2024-10-18 PROCEDURE — 25000003 PHARM REV CODE 250: Performed by: STUDENT IN AN ORGANIZED HEALTH CARE EDUCATION/TRAINING PROGRAM

## 2024-10-18 PROCEDURE — 37000008 HC ANESTHESIA 1ST 15 MINUTES: Performed by: OBSTETRICS & GYNECOLOGY

## 2024-10-18 PROCEDURE — 27201423 OPTIME MED/SURG SUP & DEVICES STERILE SUPPLY: Performed by: OBSTETRICS & GYNECOLOGY

## 2024-10-18 PROCEDURE — 86900 BLOOD TYPING SEROLOGIC ABO: CPT | Performed by: OBSTETRICS & GYNECOLOGY

## 2024-10-18 PROCEDURE — 63600175 PHARM REV CODE 636 W HCPCS

## 2024-10-18 PROCEDURE — 36000711: Performed by: OBSTETRICS & GYNECOLOGY

## 2024-10-18 PROCEDURE — 58571 TLH W/T/O 250 G OR LESS: CPT | Mod: ,,, | Performed by: OBSTETRICS & GYNECOLOGY

## 2024-10-18 PROCEDURE — 36000710: Performed by: OBSTETRICS & GYNECOLOGY

## 2024-10-18 PROCEDURE — 71000039 HC RECOVERY, EACH ADD'L HOUR: Performed by: OBSTETRICS & GYNECOLOGY

## 2024-10-18 PROCEDURE — 94799 UNLISTED PULMONARY SVC/PX: CPT

## 2024-10-18 PROCEDURE — 25000003 PHARM REV CODE 250: Performed by: ANESTHESIOLOGY

## 2024-10-18 PROCEDURE — 37000009 HC ANESTHESIA EA ADD 15 MINS: Performed by: OBSTETRICS & GYNECOLOGY

## 2024-10-18 PROCEDURE — 63600175 PHARM REV CODE 636 W HCPCS: Mod: JZ,JG | Performed by: OBSTETRICS & GYNECOLOGY

## 2024-10-18 PROCEDURE — 25000003 PHARM REV CODE 250

## 2024-10-18 PROCEDURE — 71000033 HC RECOVERY, INTIAL HOUR: Performed by: OBSTETRICS & GYNECOLOGY

## 2024-10-18 PROCEDURE — 85025 COMPLETE CBC W/AUTO DIFF WBC: CPT | Performed by: OBSTETRICS & GYNECOLOGY

## 2024-10-18 PROCEDURE — 80048 BASIC METABOLIC PNL TOTAL CA: CPT | Performed by: OBSTETRICS & GYNECOLOGY

## 2024-10-18 PROCEDURE — 58571 TLH W/T/O 250 G OR LESS: CPT | Mod: 82,,, | Performed by: OBSTETRICS & GYNECOLOGY

## 2024-10-18 PROCEDURE — 81025 URINE PREGNANCY TEST: CPT | Performed by: ANESTHESIOLOGY

## 2024-10-18 PROCEDURE — 81025 URINE PREGNANCY TEST: CPT | Performed by: OBSTETRICS & GYNECOLOGY

## 2024-10-18 RX ORDER — HYDROMORPHONE HYDROCHLORIDE 2 MG/ML
0.4 INJECTION, SOLUTION INTRAMUSCULAR; INTRAVENOUS; SUBCUTANEOUS
Status: DISCONTINUED | OUTPATIENT
Start: 2024-10-18 | End: 2024-10-19 | Stop reason: HOSPADM

## 2024-10-18 RX ORDER — LIDOCAINE HYDROCHLORIDE 10 MG/ML
0.5 INJECTION, SOLUTION EPIDURAL; INFILTRATION; INTRACAUDAL; PERINEURAL ONCE
Status: DISCONTINUED | OUTPATIENT
Start: 2024-10-18 | End: 2024-10-18

## 2024-10-18 RX ORDER — PREGABALIN 75 MG/1
75 CAPSULE ORAL ONCE
Status: COMPLETED | OUTPATIENT
Start: 2024-10-18 | End: 2024-10-18

## 2024-10-18 RX ORDER — DOCUSATE SODIUM 100 MG/1
100 CAPSULE, LIQUID FILLED ORAL 2 TIMES DAILY
Qty: 60 CAPSULE | Refills: 0 | Status: SHIPPED | OUTPATIENT
Start: 2024-10-18

## 2024-10-18 RX ORDER — HYDROCODONE BITARTRATE AND ACETAMINOPHEN 5; 325 MG/1; MG/1
1 TABLET ORAL EVERY 6 HOURS PRN
Status: DISCONTINUED | OUTPATIENT
Start: 2024-10-18 | End: 2024-10-19 | Stop reason: HOSPADM

## 2024-10-18 RX ORDER — FENTANYL CITRATE 50 UG/ML
INJECTION, SOLUTION INTRAMUSCULAR; INTRAVENOUS
Status: DISCONTINUED | OUTPATIENT
Start: 2024-10-18 | End: 2024-10-18

## 2024-10-18 RX ORDER — DIPHENHYDRAMINE HYDROCHLORIDE 50 MG/ML
12.5 INJECTION INTRAMUSCULAR; INTRAVENOUS EVERY 6 HOURS PRN
Status: DISCONTINUED | OUTPATIENT
Start: 2024-10-18 | End: 2024-10-19 | Stop reason: HOSPADM

## 2024-10-18 RX ORDER — ACETAMINOPHEN 500 MG
1000 TABLET ORAL
Status: COMPLETED | OUTPATIENT
Start: 2024-10-18 | End: 2024-10-18

## 2024-10-18 RX ORDER — HALOPERIDOL 5 MG/ML
INJECTION INTRAMUSCULAR
Status: DISCONTINUED | OUTPATIENT
Start: 2024-10-18 | End: 2024-10-18

## 2024-10-18 RX ORDER — KETOROLAC TROMETHAMINE 30 MG/ML
15 INJECTION, SOLUTION INTRAMUSCULAR; INTRAVENOUS EVERY 6 HOURS
Status: COMPLETED | OUTPATIENT
Start: 2024-10-18 | End: 2024-10-19

## 2024-10-18 RX ORDER — HYDROCODONE BITARTRATE AND ACETAMINOPHEN 5; 325 MG/1; MG/1
1 TABLET ORAL EVERY 6 HOURS PRN
Qty: 20 TABLET | Refills: 0 | Status: SHIPPED | OUTPATIENT
Start: 2024-10-18

## 2024-10-18 RX ORDER — MUPIROCIN 20 MG/G
OINTMENT TOPICAL 2 TIMES DAILY
Status: DISCONTINUED | OUTPATIENT
Start: 2024-10-18 | End: 2024-10-19 | Stop reason: HOSPADM

## 2024-10-18 RX ORDER — HYDROMORPHONE HYDROCHLORIDE 2 MG/ML
0.4 INJECTION, SOLUTION INTRAMUSCULAR; INTRAVENOUS; SUBCUTANEOUS EVERY 5 MIN PRN
Status: DISCONTINUED | OUTPATIENT
Start: 2024-10-18 | End: 2024-10-18

## 2024-10-18 RX ORDER — HYDROMORPHONE HYDROCHLORIDE 2 MG/ML
0.2 INJECTION, SOLUTION INTRAMUSCULAR; INTRAVENOUS; SUBCUTANEOUS
Status: DISCONTINUED | OUTPATIENT
Start: 2024-10-18 | End: 2024-10-18

## 2024-10-18 RX ORDER — DEXAMETHASONE SODIUM PHOSPHATE 4 MG/ML
INJECTION, SOLUTION INTRA-ARTICULAR; INTRALESIONAL; INTRAMUSCULAR; INTRAVENOUS; SOFT TISSUE
Status: DISCONTINUED | OUTPATIENT
Start: 2024-10-18 | End: 2024-10-18

## 2024-10-18 RX ORDER — HYDROCODONE BITARTRATE AND ACETAMINOPHEN 10; 325 MG/1; MG/1
1 TABLET ORAL EVERY 6 HOURS PRN
Status: DISCONTINUED | OUTPATIENT
Start: 2024-10-18 | End: 2024-10-19 | Stop reason: HOSPADM

## 2024-10-18 RX ORDER — PROCHLORPERAZINE EDISYLATE 5 MG/ML
5 INJECTION INTRAMUSCULAR; INTRAVENOUS EVERY 6 HOURS PRN
Status: DISCONTINUED | OUTPATIENT
Start: 2024-10-18 | End: 2024-10-19 | Stop reason: HOSPADM

## 2024-10-18 RX ORDER — KETOROLAC TROMETHAMINE 30 MG/ML
15 INJECTION, SOLUTION INTRAMUSCULAR; INTRAVENOUS EVERY 6 HOURS PRN
Status: DISCONTINUED | OUTPATIENT
Start: 2024-10-18 | End: 2024-10-18

## 2024-10-18 RX ORDER — ACETAMINOPHEN 500 MG
1000 TABLET ORAL EVERY 6 HOURS PRN
Status: DISCONTINUED | OUTPATIENT
Start: 2024-10-18 | End: 2024-10-18

## 2024-10-18 RX ORDER — PROPOFOL 10 MG/ML
VIAL (ML) INTRAVENOUS
Status: DISCONTINUED | OUTPATIENT
Start: 2024-10-18 | End: 2024-10-18

## 2024-10-18 RX ORDER — ZOLPIDEM TARTRATE 5 MG/1
5 TABLET ORAL NIGHTLY PRN
Status: DISCONTINUED | OUTPATIENT
Start: 2024-10-18 | End: 2024-10-19 | Stop reason: HOSPADM

## 2024-10-18 RX ORDER — SODIUM CHLORIDE 0.9 % (FLUSH) 0.9 %
3 SYRINGE (ML) INJECTION
Status: DISCONTINUED | OUTPATIENT
Start: 2024-10-18 | End: 2024-10-18

## 2024-10-18 RX ORDER — FAMOTIDINE 20 MG/1
20 TABLET, FILM COATED ORAL
Status: COMPLETED | OUTPATIENT
Start: 2024-10-18 | End: 2024-10-18

## 2024-10-18 RX ORDER — ADHESIVE BANDAGE
30 BANDAGE TOPICAL 2 TIMES DAILY
Status: DISCONTINUED | OUTPATIENT
Start: 2024-10-18 | End: 2024-10-19 | Stop reason: HOSPADM

## 2024-10-18 RX ORDER — HYDROCODONE BITARTRATE AND ACETAMINOPHEN 10; 325 MG/1; MG/1
1 TABLET ORAL EVERY 6 HOURS PRN
Status: DISCONTINUED | OUTPATIENT
Start: 2024-10-18 | End: 2024-10-18

## 2024-10-18 RX ORDER — CLINDAMYCIN PHOSPHATE 900 MG/50ML
900 INJECTION, SOLUTION INTRAVENOUS
Status: COMPLETED | OUTPATIENT
Start: 2024-10-18 | End: 2024-10-18

## 2024-10-18 RX ORDER — DEXMEDETOMIDINE HYDROCHLORIDE 100 UG/ML
INJECTION, SOLUTION INTRAVENOUS
Status: DISCONTINUED | OUTPATIENT
Start: 2024-10-18 | End: 2024-10-18

## 2024-10-18 RX ORDER — ALBUTEROL SULFATE 90 UG/1
2 INHALANT RESPIRATORY (INHALATION) EVERY 6 HOURS PRN
Status: DISCONTINUED | OUTPATIENT
Start: 2024-10-18 | End: 2024-10-19 | Stop reason: HOSPADM

## 2024-10-18 RX ORDER — SODIUM CHLORIDE, SODIUM LACTATE, POTASSIUM CHLORIDE, CALCIUM CHLORIDE 600; 310; 30; 20 MG/100ML; MG/100ML; MG/100ML; MG/100ML
INJECTION, SOLUTION INTRAVENOUS CONTINUOUS
Status: DISCONTINUED | OUTPATIENT
Start: 2024-10-18 | End: 2024-10-19

## 2024-10-18 RX ORDER — NALOXONE HCL 0.4 MG/ML
0.02 VIAL (ML) INJECTION
Status: DISCONTINUED | OUTPATIENT
Start: 2024-10-18 | End: 2024-10-19 | Stop reason: HOSPADM

## 2024-10-18 RX ORDER — IBUPROFEN 200 MG
200 TABLET ORAL EVERY 6 HOURS
Status: DISCONTINUED | OUTPATIENT
Start: 2024-10-19 | End: 2024-10-19 | Stop reason: HOSPADM

## 2024-10-18 RX ORDER — SODIUM CHLORIDE, SODIUM LACTATE, POTASSIUM CHLORIDE, CALCIUM CHLORIDE 600; 310; 30; 20 MG/100ML; MG/100ML; MG/100ML; MG/100ML
INJECTION, SOLUTION INTRAVENOUS CONTINUOUS
Status: DISCONTINUED | OUTPATIENT
Start: 2024-10-18 | End: 2024-10-18

## 2024-10-18 RX ORDER — BUPIVACAINE HYDROCHLORIDE 2.5 MG/ML
INJECTION, SOLUTION EPIDURAL; INFILTRATION; INTRACAUDAL
Status: DISCONTINUED | OUTPATIENT
Start: 2024-10-18 | End: 2024-10-18 | Stop reason: HOSPADM

## 2024-10-18 RX ORDER — GLUCAGON 1 MG
1 KIT INJECTION
Status: DISCONTINUED | OUTPATIENT
Start: 2024-10-18 | End: 2024-10-18

## 2024-10-18 RX ORDER — ONDANSETRON HYDROCHLORIDE 2 MG/ML
4 INJECTION, SOLUTION INTRAVENOUS EVERY 6 HOURS PRN
Status: DISCONTINUED | OUTPATIENT
Start: 2024-10-18 | End: 2024-10-19 | Stop reason: HOSPADM

## 2024-10-18 RX ORDER — ONDANSETRON HYDROCHLORIDE 2 MG/ML
4 INJECTION, SOLUTION INTRAVENOUS EVERY 4 HOURS PRN
Status: DISCONTINUED | OUTPATIENT
Start: 2024-10-18 | End: 2024-10-18

## 2024-10-18 RX ORDER — IBUPROFEN 600 MG/1
600 TABLET ORAL 3 TIMES DAILY
Qty: 60 TABLET | Refills: 0 | Status: SHIPPED | OUTPATIENT
Start: 2024-10-18

## 2024-10-18 RX ORDER — ROCURONIUM BROMIDE 10 MG/ML
INJECTION, SOLUTION INTRAVENOUS
Status: DISCONTINUED | OUTPATIENT
Start: 2024-10-18 | End: 2024-10-18

## 2024-10-18 RX ORDER — OXYCODONE HYDROCHLORIDE 5 MG/1
5 TABLET ORAL
Status: DISCONTINUED | OUTPATIENT
Start: 2024-10-18 | End: 2024-10-18

## 2024-10-18 RX ORDER — OXYCODONE HYDROCHLORIDE 5 MG/1
5 TABLET ORAL EVERY 4 HOURS PRN
Status: DISCONTINUED | OUTPATIENT
Start: 2024-10-18 | End: 2024-10-18

## 2024-10-18 RX ORDER — ONDANSETRON HYDROCHLORIDE 2 MG/ML
INJECTION, SOLUTION INTRAVENOUS
Status: DISCONTINUED | OUTPATIENT
Start: 2024-10-18 | End: 2024-10-18

## 2024-10-18 RX ORDER — MIDAZOLAM HYDROCHLORIDE 1 MG/ML
INJECTION INTRAMUSCULAR; INTRAVENOUS
Status: DISCONTINUED | OUTPATIENT
Start: 2024-10-18 | End: 2024-10-18

## 2024-10-18 RX ORDER — MEPERIDINE HYDROCHLORIDE 25 MG/ML
12.5 INJECTION INTRAMUSCULAR; INTRAVENOUS; SUBCUTANEOUS ONCE AS NEEDED
Status: DISCONTINUED | OUTPATIENT
Start: 2024-10-18 | End: 2024-10-18

## 2024-10-18 RX ORDER — PROCHLORPERAZINE EDISYLATE 5 MG/ML
5 INJECTION INTRAMUSCULAR; INTRAVENOUS EVERY 30 MIN PRN
Status: DISCONTINUED | OUTPATIENT
Start: 2024-10-18 | End: 2024-10-18

## 2024-10-18 RX ORDER — SENNOSIDES 8.6 MG/1
8.6 TABLET ORAL 2 TIMES DAILY
Status: DISCONTINUED | OUTPATIENT
Start: 2024-10-18 | End: 2024-10-19 | Stop reason: HOSPADM

## 2024-10-18 RX ORDER — CEFOXITIN 1 G/1
2 INJECTION, POWDER, FOR SOLUTION INTRAVENOUS
Status: DISCONTINUED | OUTPATIENT
Start: 2024-10-18 | End: 2024-10-19

## 2024-10-18 RX ADMIN — ONDANSETRON HYDROCHLORIDE 4 MG: 2 INJECTION INTRAMUSCULAR; INTRAVENOUS at 02:10

## 2024-10-18 RX ADMIN — ROCURONIUM BROMIDE 50 MG: 10 SOLUTION INTRAVENOUS at 12:10

## 2024-10-18 RX ADMIN — MAGNESIUM HYDROXIDE 2400 MG: 400 SUSPENSION ORAL at 08:10

## 2024-10-18 RX ADMIN — FENTANYL CITRATE 50 MCG: 50 INJECTION, SOLUTION INTRAMUSCULAR; INTRAVENOUS at 12:10

## 2024-10-18 RX ADMIN — HALOPERIDOL LACTATE 1 MG: 5 INJECTION, SOLUTION INTRAMUSCULAR at 12:10

## 2024-10-18 RX ADMIN — FAMOTIDINE 20 MG: 20 TABLET, FILM COATED ORAL at 08:10

## 2024-10-18 RX ADMIN — ACETAMINOPHEN 1000 MG: 500 TABLET, FILM COATED ORAL at 08:10

## 2024-10-18 RX ADMIN — DEXAMETHASONE SODIUM PHOSPHATE 8 MG: 4 INJECTION, SOLUTION INTRAMUSCULAR; INTRAVENOUS at 12:10

## 2024-10-18 RX ADMIN — PROPOFOL 200 MG: 10 INJECTION, EMULSION INTRAVENOUS at 12:10

## 2024-10-18 RX ADMIN — KETOROLAC TROMETHAMINE 15 MG: 30 INJECTION, SOLUTION INTRAMUSCULAR; INTRAVENOUS at 11:10

## 2024-10-18 RX ADMIN — HYDROCODONE BITARTRATE AND ACETAMINOPHEN 1 TABLET: 10; 325 TABLET ORAL at 03:10

## 2024-10-18 RX ADMIN — ROCURONIUM BROMIDE 10 MG: 10 SOLUTION INTRAVENOUS at 02:10

## 2024-10-18 RX ADMIN — GENTAMICIN SULFATE 271.2 MG: 40 INJECTION, SOLUTION INTRAMUSCULAR; INTRAVENOUS at 12:10

## 2024-10-18 RX ADMIN — OXYCODONE HYDROCHLORIDE 5 MG: 5 TABLET ORAL at 03:10

## 2024-10-18 RX ADMIN — HYDROMORPHONE HYDROCHLORIDE 0.4 MG: 2 INJECTION INTRAMUSCULAR; INTRAVENOUS; SUBCUTANEOUS at 03:10

## 2024-10-18 RX ADMIN — FENTANYL CITRATE 100 MCG: 50 INJECTION, SOLUTION INTRAMUSCULAR; INTRAVENOUS at 12:10

## 2024-10-18 RX ADMIN — PREGABALIN 75 MG: 75 CAPSULE ORAL at 08:10

## 2024-10-18 RX ADMIN — ROCURONIUM BROMIDE 10 MG: 10 SOLUTION INTRAVENOUS at 01:10

## 2024-10-18 RX ADMIN — CEFOXITIN 2 G: 1 INJECTION, POWDER, FOR SOLUTION INTRAVENOUS at 11:10

## 2024-10-18 RX ADMIN — SODIUM CHLORIDE, SODIUM LACTATE, POTASSIUM CHLORIDE, AND CALCIUM CHLORIDE: 600; 310; 30; 20 INJECTION, SOLUTION INTRAVENOUS at 02:10

## 2024-10-18 RX ADMIN — FENTANYL CITRATE 50 MCG: 50 INJECTION, SOLUTION INTRAMUSCULAR; INTRAVENOUS at 01:10

## 2024-10-18 RX ADMIN — MUPIROCIN: 20 OINTMENT TOPICAL at 08:10

## 2024-10-18 RX ADMIN — FENTANYL CITRATE 25 MCG: 50 INJECTION, SOLUTION INTRAMUSCULAR; INTRAVENOUS at 01:10

## 2024-10-18 RX ADMIN — ROCURONIUM BROMIDE 30 MG: 10 SOLUTION INTRAVENOUS at 12:10

## 2024-10-18 RX ADMIN — DOXYCYCLINE 100 MG: 100 INJECTION, POWDER, LYOPHILIZED, FOR SOLUTION INTRAVENOUS at 05:10

## 2024-10-18 RX ADMIN — GLYCOPYRROLATE 0.2 MG: 0.2 INJECTION, SOLUTION INTRAMUSCULAR; INTRAVITREAL at 12:10

## 2024-10-18 RX ADMIN — SODIUM CHLORIDE, SODIUM LACTATE, POTASSIUM CHLORIDE, AND CALCIUM CHLORIDE: 600; 310; 30; 20 INJECTION, SOLUTION INTRAVENOUS at 11:10

## 2024-10-18 RX ADMIN — MIDAZOLAM HYDROCHLORIDE 2 MG: 1 INJECTION INTRAMUSCULAR; INTRAVENOUS at 12:10

## 2024-10-18 RX ADMIN — HYDROCODONE BITARTRATE AND ACETAMINOPHEN 1 TABLET: 10; 325 TABLET ORAL at 08:10

## 2024-10-18 RX ADMIN — CEFOXITIN 2 G: 1 INJECTION, POWDER, FOR SOLUTION INTRAVENOUS at 05:10

## 2024-10-18 RX ADMIN — KETOROLAC TROMETHAMINE 15 MG: 30 INJECTION, SOLUTION INTRAMUSCULAR; INTRAVENOUS at 05:10

## 2024-10-18 RX ADMIN — CLINDAMYCIN PHOSPHATE 900 MG: 18 INJECTION, SOLUTION INTRAVENOUS at 12:10

## 2024-10-18 RX ADMIN — SENNOSIDES 8.6 MG: 8.6 TABLET, FILM COATED ORAL at 08:10

## 2024-10-18 RX ADMIN — SUGAMMADEX 200 MG: 100 INJECTION, SOLUTION INTRAVENOUS at 02:10

## 2024-10-18 RX ADMIN — SODIUM CHLORIDE, POTASSIUM CHLORIDE, SODIUM LACTATE AND CALCIUM CHLORIDE: 600; 310; 30; 20 INJECTION, SOLUTION INTRAVENOUS at 06:10

## 2024-10-18 RX ADMIN — DEXMEDETOMIDINE HYDROCHLORIDE 12 MCG: 100 INJECTION, SOLUTION, CONCENTRATE INTRAVENOUS at 12:10

## 2024-10-18 NOTE — ANESTHESIA PREPROCEDURE EVALUATION
10/18/2024  Wanda Singleton is a 44 y.o., female.      Pre-op Assessment    I have reviewed the Patient Summary Reports.     I have reviewed the Nursing Notes. I have reviewed the NPO Status.   I have reviewed the Medications.     Review of Systems  Anesthesia Hx:  No problems with previous Anesthesia             Denies Family Hx of Anesthesia complications.    Denies Personal Hx of Anesthesia complications.                    Social:  Smoker       Hematology/Oncology:                        --  Cancer in past history:       Other (see Oncology comments)          Oncology Comments: Skin CA     EENT/Dental:  chronic allergic rhinitis           Cardiovascular:  Cardiovascular Normal                                              Pulmonary:    Asthma mild                   Renal/:  Renal/ Normal                 Hepatic/GI:  Hepatic/GI Normal                    Musculoskeletal:  Musculoskeletal Normal                Neurological:      Headaches                                 Endocrine:  Endocrine Normal            Psych:   anxiety               Physical Exam  General: Well nourished and Cooperative    Airway:  Mallampati: II   Mouth Opening: Normal  TM Distance: Normal  Neck ROM: Extension Painful    Dental:  Intact      Anesthesia Plan  Type of Anesthesia, risks & benefits discussed:    Anesthesia Type: Gen ETT  Intra-op Monitoring Plan: Standard ASA Monitors  Post Op Pain Control Plan: multimodal analgesia  Induction:  IV  Airway Plan: Video  Informed Consent: Informed consent signed with the Patient and all parties understand the risks and agree with anesthesia plan.  All questions answered.   ASA Score: 2  Day of Surgery Review of History & Physical: H&P Update referred to the surgeon/provider.    Ready For Surgery From Anesthesia Perspective.     .

## 2024-10-18 NOTE — NURSING TRANSFER
Nursing Transfer Note      10/18/2024   5:56 PM    Nurse giving handoff: Pascual Eldridge)  Nurse receiving handoff: Ilene    Reason patient is being transferred:  s/p TLH/BS    Transfer From: PACU    Transfer via wheelchair    Transported by nursing staff    Transfer Vital Signs:  Blood Pressure: 120/61  Heart Rate: 89  O2: 100  Temperature: 98.0  Respirations: 16    Order for Tele Monitor? No    Additional Lines: Singh Catheter    Medicines sent: No    Any special needs or follow-up needed: voiding trial tonight    Patient belongings transferred with patient: Yes    Chart send with patient: Yes    Notified: spouse    Upon arrival to floor: patient oriented to room, call bell in reach, and bed in lowest position

## 2024-10-18 NOTE — ANESTHESIA PROCEDURE NOTES
Intubation    Date/Time: 10/18/2024 12:22 PM    Performed by: Julia Jade CRNA  Authorized by: Emile John MD    Intubation:     Induction:  Intravenous    Intubated:  Postinduction    Mask Ventilation:  Easy mask    Attempts:  1    Attempted By:  CRNA    Method of Intubation:  Video laryngoscopy    Blade:  Colbert 3    Laryngeal View Grade: Grade I - full view of cords      Difficult Airway Encountered?: No      Complications:  None    Airway Device:  Oral endotracheal tube    Airway Device Size:  7.0    Style/Cuff Inflation:  Cuffed (inflated to minimal occlusive pressure)    Tube secured:  22    Secured at:  The lips    Placement Verified By:  Capnometry    Complicating Factors:  None    Findings Post-Intubation:  BS equal bilateral and atraumatic/condition of teeth unchanged

## 2024-10-18 NOTE — TRANSFER OF CARE
"Anesthesia Transfer of Care Note    Patient: Wanda Singleton    Procedure(s) Performed: Procedure(s) (LRB):  HYSTERECTOMY,TOTAL,LAPAROSCOPIC,WITH SALPINGECTOMY (N/A)    Patient location: PACU    Anesthesia Type: general    Transport from OR: Transported from OR on 6-10 L/min O2 by face mask with adequate spontaneous ventilation    Post pain: adequate analgesia    Post assessment: tolerated procedure well and no apparent anesthetic complications    Post vital signs: stable    Level of consciousness: awake, alert and oriented    Nausea/Vomiting: no nausea/vomiting    Complications: none    Transfer of care protocol was followed      Last vitals: Visit Vitals  /82 (BP Location: Right arm, Patient Position: Lying)   Pulse 90   Temp 36.3 °C (97.3 °F) (Skin)   Resp 17   Ht 5' 3" (1.6 m)   Wt 56.7 kg (125 lb)   LMP 10/02/2024 (Exact Date)   SpO2 100%   Breastfeeding No   BMI 22.14 kg/m²     "

## 2024-10-18 NOTE — OP NOTE
OPERATIVE REPORT    DATE: 10/18/2024    PREOPERATIVE DIAGNOSIS  1. Abnormal uterine bleeding  2. Chronic pelvic pain  3. Endometriosis  4. H/o TOA     POSTOPERATIVE DIAGNOSIS  Same    PROCEDURE:  1. Total Laparoscopic Hysterectomy  2. Bilateral salpingectomy  3. Lysis of adhesions  4. Drainage of left ovarian endometrioma    SURGEON: Yenny Dang MD    ASSISTANT: Chrissie Soto MD (no qualified resident available)    ANESTHESIA: General    COMPLICATIONS: None    EBL: 50 cc    URINE OUTPUT: See anesthesia report    FINDINGS: Normal external female genitalia. 8-10 week size uterus on exam with some mobility, limited descent with tenaculum. Normal liver, gallbladder, and stomach. Adhesions of the omentum to the anterior abdominal wall and uterine fundus. Adhesions of the large intestine to the abdominal sidewalls. Normal appendix. Dilated, abnormal fallopian tubes bilaterally with dense adhesions to the ovaries and to the pelvic sidewalls. Adhesions of large intestine to left adnexa and posterior uterus. Upon dissecting the cul-de-sac, thick, purulent complex fluid encountered c/w prior pelvic infection/TOA. Left ovary with 2-3 cm endometrioma/chocolate cyst, drained during removal of left fallopian tube. ROV adhered to pelvic sidewall with simple cyst. Ureters visualized in their normal anatomic positions at the pelvic brim at the start and the conclusion of the case. Cuff closed with Endostitch.     PROCEDURE: Patient was taken to the operating room where general anesthesia was administered and found to be adequate. She was prepped and draped in the dorsal lithotomy position. A edmondson catheter was placed into the bladder. Two right angled retractors were placed in the vagina to visualize the cervix. The anterior lip of the cervix was grasped with a single tooth tenaculum. The uterus was sounded to approximately 8 cm and serially dilated with Hegar dilators to accommodate a SAPPHIRE uterine manipulator, which was  placed inside the endometrial cavity without difficulty. All other instruments were removed from the patient's vagina. Gloves were changed. Attention was turned to the anterior abdominal wall. Local anesthetic was injected at each port site beginning with the umbilicus for improved postoperative analgesia. The abdominal wall was tented up and a Veress needle was inserted through the umbilicus, placement into the peritoneal cavity was confirmed via saline drop test. The abdomen was insufflated with CO2 to a pressure of 15mm Hg. A 10 mm infraumbilical skin incision was made with the scalpel after injecting local anesthetic, and a 10 mm Optiview trocar with the laparoscope was advanced through this incision. An abdodminal survey was performed, ensuring no damage to intraabdominal contents upon initial entry. The aforementioned findings were noted. Excellent hemostasis was noted. The patient was placed in deep Trendelenburg. A 5 mm left lateral skin incision was made with a scalpel and a 5 mm Air Seal trocar was advanced through this incision under visualization of the camera. A 5 mm right lateral skin incision was made with the scalpel. A 5 mm air trocar was advanced through this incision under direct visualization. Excellent hemostasis was noted.     A combination of Ligasure and blunt traction was used to perform lysis of adhesions to remove omentum and small and large bowel from the operative field, taking great care not to injure the bowel. Fimbriated ends of the fallopian tubes could not be identified due to significant adhesions and hydro vs pyosalpinx.The proximal end of the right fallopian tube was transected from the uterine cornua, the right round ligament identified, cauterized, and transected, followed by the right uteroovarian ligament. The Ligasure was used to dissect the anterior and medial leaves of the broad ligament and to open the vesicouterine peritoneum to develop the bladder flap. The bladder was  felt to be well out of the operative field and the right uterine vessels were identified, skeletonized, and transected.    The same steps were performed on the patient's left side. The rest of the bladder flap was developed, and the left uterine vessels were identified, skeletonized, and transected. Attention was turned to the anterior portion of the cervix. The bladder was dissected off the cervix about 1 cm inferior to the palpable colpotomizer ring to ensure no bladder injury during colpotomy. The J-hook was used to create colpotomy circumferentially, taking additional bites laterally at the level of the uterine vessels to ensure hemostasis. The uterus was then removed vaginally. A moist laparotomy sponge inside of glove was placed inside the vagina to maintain intraperitoneal pressure. Attention was turned to the vaginal cuff and posterior cul-de-sac which were suction-irrigated and inspected and hemostasis was excellent.     A combination of the Ligasure and blunt traction was utilized to dissect the enlarged, dilated, abnormal bilateral fallopian tubes from their attachments to the ovaries and the pelvic sidewalls. An endometrioma of the left ovary was punctured and drained and the contents copiously suction-irrigated. All vascular pedicles were examined and hemostasis was excellent. The fallopian tubes were removed through the vagina and handed off the field to be included with the final specimen to pathology. The vaginal cuff was closed with two interrupted sutures at each angle and a series of 3 mattress sutures using 0-vicryl with the Endostitch. Hemostasis was observed and noted to be excellent. Ureters were visualized at the pelvic brim bilaterally with good peristalsis noted.    The 10 mm trocar was removed under direct visualization and  Suture Ease used to close the fascia. The instruments and trocars were removed under direct visualization and the abdomen deflated of its contents.      Attention was  turned to the anterior abdominal wall. The skin incisions were closed with 4-0 Monocryl in subcuticular fashion and Dermabond glue was placed. Sponge, lap, and needle counts were correct x 2. The patient was taken to the recovery room in stable condition with the edmondson draining urine.       Yenny Dang MD  Department of Obstetrics & Gynecology  [facility]

## 2024-10-18 NOTE — INTERVAL H&P NOTE
The patient has been examined and the H&P has been reviewed:    I concur with the findings and no changes have occurred since H&P was written.    Surgery risks, benefits and alternative options discussed and understood by patient/family.    Wanda Singleton is 44 y.o.  presenting for scheduled TLH/BS.    Temp:  [97.7 °F (36.5 °C)] 97.7 °F (36.5 °C)  Pulse:  [80] 80  Resp:  [16] 16  SpO2:  [100 %] 100 %  BP: (114)/(74) 114/74    General: NAD, alert, oriented, cooperative  HEENT: NCAT, EOM grossly intact  Lungs: Normal WOB  Heart: regular rate  Abdomen: soft, nondistended, nontender, no rebound or guarding    Consents in chart. All questions answered and concerns addressed. To OR for planned procedure.    Radha Snider MD  Obstetrics and Gynecology, PGY-2

## 2024-10-18 NOTE — PROGRESS NOTES
Remain stable and comfortable, Pacu discharge criteria met, Dr Dang Team informed, For voiding Trial tonight, Med surg RN informed

## 2024-10-18 NOTE — ANESTHESIA POSTPROCEDURE EVALUATION
Anesthesia Post Evaluation    Patient: Wanda Singleton    Procedure(s) Performed: Procedure(s) (LRB):  HYSTERECTOMY,TOTAL,LAPAROSCOPIC,WITH SALPINGECTOMY (N/A)    Final Anesthesia Type: general      Patient location during evaluation: PACU  Patient participation: Yes- Able to Participate  Level of consciousness: awake and alert  Post-procedure vital signs: reviewed and stable  Pain management: adequate  Airway patency: patent  TEDDY mitigation strategies: Extubation while patient is awake  PONV status at discharge: No PONV  Anesthetic complications: no      Cardiovascular status: hemodynamically stable  Respiratory status: unassisted  Hydration status: euvolemic  Follow-up not needed.              Vitals Value Taken Time   /71 10/18/24 1601   Temp 36.6 °C (97.8 °F) 10/18/24 1600   Pulse 70 10/18/24 1611   Resp 19 10/18/24 1600   SpO2 100 % 10/18/24 1611   Vitals shown include unfiled device data.      No case tracking events are documented in the log.      Pain/Anthony Score: Pain Rating Prior to Med Admin: 7 (10/18/2024  3:42 PM)  Pain Rating Post Med Admin: 6 (10/18/2024  4:02 PM)  Anthony Score: 9 (10/18/2024  4:02 PM)

## 2024-10-18 NOTE — NURSING TRANSFER
Nursing Transfer Note      10/18/2024   4:35 PM    Nurse giving handoff:jay villalba  Nurse receiving handoff:rn room 387    Reason patient is being transferred: post op    Transfer To: room 387    Transfer via bed    Transfer with     Transported by rn    Transfer Vital Signs:  Blood Pressure:see flowsheet  Heart Rate:  O2:  Temperature:  Respirations:    Telemetry:   Order for Tele Monitor? No    Additional Lines: Singh Catheter    Medicines sent:     Any special needs or follow-up needed:     Patient belongings transferred with patient: No    Chart send with patient: Yes    Notified: spouse    Patient reassessed at(date, time)  1  Upon arrival to floor:

## 2024-10-19 LAB
BASOPHILS # BLD AUTO: 0.05 K/UL (ref 0–0.2)
BASOPHILS NFR BLD: 0.3 % (ref 0–1.9)
DIFFERENTIAL METHOD BLD: ABNORMAL
EOSINOPHIL # BLD AUTO: 0 K/UL (ref 0–0.5)
EOSINOPHIL NFR BLD: 0.1 % (ref 0–8)
ERYTHROCYTE [DISTWIDTH] IN BLOOD BY AUTOMATED COUNT: 13.4 % (ref 11.5–14.5)
HCT VFR BLD AUTO: 28.1 % (ref 37–48.5)
HGB BLD-MCNC: 8.9 G/DL (ref 12–16)
IMM GRANULOCYTES # BLD AUTO: 0.09 K/UL (ref 0–0.04)
IMM GRANULOCYTES NFR BLD AUTO: 0.6 % (ref 0–0.5)
LYMPHOCYTES # BLD AUTO: 2 K/UL (ref 1–4.8)
LYMPHOCYTES NFR BLD: 12.8 % (ref 18–48)
MCH RBC QN AUTO: 28.7 PG (ref 27–31)
MCHC RBC AUTO-ENTMCNC: 31.7 G/DL (ref 32–36)
MCV RBC AUTO: 91 FL (ref 82–98)
MONOCYTES # BLD AUTO: 1 K/UL (ref 0.3–1)
MONOCYTES NFR BLD: 6.4 % (ref 4–15)
NEUTROPHILS # BLD AUTO: 12.2 K/UL (ref 1.8–7.7)
NEUTROPHILS NFR BLD: 79.8 % (ref 38–73)
NRBC BLD-RTO: 0 /100 WBC
PLATELET # BLD AUTO: 390 K/UL (ref 150–450)
PMV BLD AUTO: 10.5 FL (ref 9.2–12.9)
RBC # BLD AUTO: 3.1 M/UL (ref 4–5.4)
WBC # BLD AUTO: 15.23 K/UL (ref 3.9–12.7)

## 2024-10-19 PROCEDURE — 63600175 PHARM REV CODE 636 W HCPCS

## 2024-10-19 PROCEDURE — 25000003 PHARM REV CODE 250: Performed by: OBSTETRICS & GYNECOLOGY

## 2024-10-19 PROCEDURE — 36415 COLL VENOUS BLD VENIPUNCTURE: CPT

## 2024-10-19 PROCEDURE — 63600175 PHARM REV CODE 636 W HCPCS: Performed by: OBSTETRICS & GYNECOLOGY

## 2024-10-19 PROCEDURE — 25000003 PHARM REV CODE 250

## 2024-10-19 PROCEDURE — 85025 COMPLETE CBC W/AUTO DIFF WBC: CPT

## 2024-10-19 PROCEDURE — 94761 N-INVAS EAR/PLS OXIMETRY MLT: CPT

## 2024-10-19 RX ORDER — CEFOXITIN 2 G/1
2 INJECTION, POWDER, FOR SOLUTION INTRAVENOUS
Status: DISCONTINUED | OUTPATIENT
Start: 2024-10-19 | End: 2024-10-19 | Stop reason: HOSPADM

## 2024-10-19 RX ADMIN — MAGNESIUM HYDROXIDE 2400 MG: 400 SUSPENSION ORAL at 08:10

## 2024-10-19 RX ADMIN — CEFOXITIN 2 G: 2 INJECTION, POWDER, FOR SOLUTION INTRAVENOUS at 12:10

## 2024-10-19 RX ADMIN — KETOROLAC TROMETHAMINE 15 MG: 30 INJECTION, SOLUTION INTRAMUSCULAR; INTRAVENOUS at 12:10

## 2024-10-19 RX ADMIN — MUPIROCIN: 20 OINTMENT TOPICAL at 08:10

## 2024-10-19 RX ADMIN — KETOROLAC TROMETHAMINE 15 MG: 30 INJECTION, SOLUTION INTRAMUSCULAR; INTRAVENOUS at 05:10

## 2024-10-19 RX ADMIN — DOXYCYCLINE 100 MG: 100 INJECTION, POWDER, LYOPHILIZED, FOR SOLUTION INTRAVENOUS at 06:10

## 2024-10-19 RX ADMIN — HYDROCODONE BITARTRATE AND ACETAMINOPHEN 1 TABLET: 10; 325 TABLET ORAL at 08:10

## 2024-10-19 RX ADMIN — HYDROCODONE BITARTRATE AND ACETAMINOPHEN 1 TABLET: 10; 325 TABLET ORAL at 03:10

## 2024-10-19 RX ADMIN — DIPHENHYDRAMINE HYDROCHLORIDE 12.5 MG: 50 INJECTION INTRAMUSCULAR; INTRAVENOUS at 06:10

## 2024-10-19 RX ADMIN — CEFOXITIN 2 G: 2 INJECTION, POWDER, FOR SOLUTION INTRAVENOUS at 05:10

## 2024-10-19 RX ADMIN — SENNOSIDES 8.6 MG: 8.6 TABLET, FILM COATED ORAL at 08:10

## 2024-10-19 NOTE — NURSING
Active voiding trial completed.300cc's instilled at 10 am. Pt voided 250cc's by 10:15 am. MD notified.

## 2024-10-19 NOTE — CARE UPDATE
CM messaged Dr. Kohler informing that patient meets for obs if she plans on keeping patient another night. Message was seen by Dr. Kohler.

## 2024-10-19 NOTE — DISCHARGE SUMMARY
Discharge Summary  Gynecology      Admit Date: 10/18/2024    Discharge Date and Time: 10/19/2024 3:15 PM    Attending Physician: Yenny Dang MD    Principal Diagnoses: Status post hysterectomy    Active Hospital Problems    Diagnosis  POA    *S/P TLH/BS [Z90.710]  No      Resolved Hospital Problems   No resolved problems to display.       Procedures: Procedure(s) (LRB):  HYSTERECTOMY,TOTAL,LAPAROSCOPIC,WITH SALPINGECTOMY (N/A)    Discharged Condition: good    Hospital Course:   Wanda Singleton is a 44 y.o. y.o.  female who presented on 10/18/2024 for above procedures for the treatment AUB. Patient tolerated procedure. PMH significant for endometriosis and TOA. Intra op there was purulent complex fluid upon dissecting the cul de sac c/w prior pelvic infection. Patient was admitted overnight for 24 hours if IV antibiotics. Post-operative course was uncomplicated. Patient passed active void trial.   On day of discharge, patient was urinating, ambulating, and tolerating a regular diet without difficulty. Pain was well controlled on PO medication. She was discharged home on POD#1 in stable condition with instructions to follow up with Dr. Dang in 6 weeks.     Consults: None    Significant Diagnostic Studies:  Recent Labs   Lab 10/18/24  0810 10/19/24  0524   WBC 6.34 15.23*   HGB 10.1* 8.9*   HCT 31.8* 28.1*   MCV 91 91    390        Treatments:   1. Surgery as above    Disposition: Home or Self Care    Patient Instructions:   Current Discharge Medication List        CONTINUE these medications which have CHANGED    Details   docusate sodium (COLACE) 100 MG capsule Take 1 capsule (100 mg total) by mouth 2 (two) times daily.  Qty: 60 capsule, Refills: 0      HYDROcodone-acetaminophen (NORCO) 5-325 mg per tablet Take 1 tablet by mouth every 6 (six) hours as needed for Pain.  Qty: 20 tablet, Refills: 0    Comments: n/a       ibuprofen (ADVIL,MOTRIN) 600 MG tablet Take 1 tablet (600 mg total) by  mouth 3 (three) times daily.  Qty: 60 tablet, Refills: 0           CONTINUE these medications which have NOT CHANGED    Details   ascorbic acid, vitamin C, (VITAMIN C) 500 MG tablet Take 500 mg by mouth once daily.      diphenhydrAMINE (BENADRYL) 25 mg capsule Take 1 capsule by mouth every evening.      famotidine (PEPCID) 40 MG tablet Take 1 tablet (40 mg total) by mouth once daily.  Qty: 30 tablet, Refills: 3      inulin (FIBER GUMMIES ORAL) Take by mouth.      lysine (L-LYSINE) 500 mg Tab Take 500 mg by mouth once daily.      multivitamin (THERAGRAN) per tablet Take 1 tablet by mouth once daily.      UNABLE TO FIND Cbd oil      albuterol (PROVENTIL/VENTOLIN HFA) 90 mcg/actuation inhaler Inhale 2 puffs into the lungs every 6 (six) hours as needed.      melatonin 5 mg Chew Take 1 tablet by mouth every evening.      ondansetron (ZOFRAN-ODT) 8 MG TbDL Take 1 tablet (8 mg total) by mouth every 8 (eight) hours as needed (nausea).  Qty: 30 tablet, Refills: 1           STOP taking these medications       acetaminophen (TYLENOL) 650 MG TbSR Comments:   Reason for Stopping:         bisacodyL (DULCOLAX) 10 mg Supp Comments:   Reason for Stopping:         clotrimazole-betamethasone 1-0.05% (LOTRISONE) cream Comments:   Reason for Stopping:         magnesium oxide (MAG-OX) 400 mg (241.3 mg magnesium) tablet Comments:   Reason for Stopping:         nystatin-triamcinolone (MYCOLOG II) cream Comments:   Reason for Stopping:         simethicone (MYLICON) 80 MG chewable tablet Comments:   Reason for Stopping:               Discharge Procedure Orders   Diet general     Lifting restrictions   Order Comments: No lifting greater than 15 pounds for six weeks.     Other restrictions (specify):   Order Comments: PELVIC REST:  No douching, tampons, or intercourse for 6 weeks.    If prescribed, vaginal estrogen cream may be used during the postoperative period.     DRIVING:  No driving while on narcotics. Driving may be resumed initially  with a competent passenger one to two weeks after surgery if no longer taking narcotics.     EXERCISE:  For six weeks your exercise should be limited to walking. You may walk as far as you wish, as long as you increase your level of exertion gradually and avoid slippery surfaces. You may climb stairs as needed to get around, but should not use stair climbing for exercise.     Remove dressing in 24 hours   Order Comments: If you have a bandage on wound, you may remove it the day after dismissal.  If you had steri-strips remove them once they begin to peel off (usually 2 weeks). Keep incision clean and dry.  Inspect the incision daily for signs and symptoms of infection.     Wound care routine (specify)   Order Comments: WOUND CARE:  If you have a band-aid or bandage on your wound, you may remove it the day after dismissal.  If you had steri-strips remove them once they begin to peel off (usually 2 weeks).  If your steri-strips still haven't come off in 2 weeks, please remove them. You may wash the wound with mild soap and water.   You may shower at any time but should avoid immersing any abdominal incisions in water for at least two weeks after surgery or until the wound is completely healed. If given, please shower with Hibiclens soap until bottle is completely finished. Keep your wound clean and dry.  You should observe your incision for signs of infection which include redness, warmth, drainage or fever.     Call MD for:  temperature >100.4     Call MD for:  persistent nausea and vomiting     Call MD for:  severe uncontrolled pain     Call MD for:  difficulty breathing, headache or visual disturbances     Call MD for:  redness, tenderness, or signs of infection (pain, swelling, redness, odor or green/yellow discharge around incision site)     Call MD for:  hives     Call MD for:   Order Comments: inability to void,urine is ketchup colored or you have large clots, vaginal bleeding is heavier than a  period.    VAGINAL DISCHARGE: You may develop a vaginal discharge and intermittent vaginal spotting after surgery and up to 6 weeks postoperatively.  The discharge may have an odor and may change in color but it is normal.  This is due to dissolving stiches.  Contact your surgical team if you develop vaginal or vulvar irritation along with a discharge.  Also contact your surgical team if you have vaginal discharge that smells like urine or stool.    CONSTIPATION REMEDIES: Patients are often constipated after surgery or with use of oral narcotic medicine. You should continue to take the stool softener, Senokot-S during the next six weeks, and consume adequate amounts of water.  If you have not had a bowel movement for 3 days after dismissal, or are uncomfortable and unable to pass stool, please try one or all of the following measures:  1.  Milk of Magnesia - 30 cc by mouth every 12 hours   2.  Dulcolax suppository - One suppository per rectum every 4-6 hours   3.  Metamucil, Fibercon or other bulk former - use as directed  4.  Fleets Enema        PAIN MEDICATIONS:     Take your pain medications as instructed. It is best to take pain medications before your pain becomes severe. This will allow you to take less medication yet have better pain relief. For the first 2 or 3 days it may be helpful to take your pain medications on a regular schedule (e.g. every 4 to 6 hours). This will help you to keep your pain under better control. You should then begin to take fewer medications each day until you no longer need them. Do not take pain medication on an empty stomach. This may lead to nausea and vomiting.     Activity as tolerated   Order Comments: Return to normal activity slowly as you feel able.  For 6 weeks your exercise should be limited to walking.  You may walk as far as you wish, as long as you increase your level of exertion gradually and avoid slippery surfaces.    If you had a hysterectomy at the surgery do not  insert anything in your vagina for 9 weeks.     Shower on day dressing removed (No bath)   Order Comments: You may shower at any time but should avoid immersing any abdominal incisions in water for at least 2 weeks after surgery or until the wound is completely healed.  If given, please shower with Hibaclens soap until bottle is completely finish        Follow-up Information       Yenny Dang MD Follow up in 6 week(s).    Specialty: Obstetrics and Gynecology  Why: Please attend post-operative visit with Dr. Dang  Contact information:  [address] [id number] [phone]                             Katy Lock MD  [facility]   OBGYN PGY3

## 2024-10-19 NOTE — NURSING
Pt and spouse given verbal and written discharge instructions including new prescriptions, follow-up appointments, and activity restrictions. Pt home medications filled from pharmacy delivered bedside. Pt verbalized an understanding of instructions. No apparent barriers to discharge noted.

## 2024-10-19 NOTE — OPERATIVE NOTE ADDENDUM
Certification of Assistant at Surgery       Surgery Date: 10/18/2024     Participating Surgeons:  Surgeons and Role:     * Yenny Dang MD - Primary     * Chrissie Soto MD - Assisting    Procedures:  Procedure(s) (LRB):  HYSTERECTOMY,TOTAL,LAPAROSCOPIC,WITH SALPINGECTOMY (N/A)    Assistant Surgeon's Certification of Necessity:  I understand that section 1842 (b) (6) (d) of the Social Security Act generally prohibits Medicare Part B reasonable charge payment for the services of assistants at surgery in teaching hospitals when qualified residents are available to furnish such services. I certify that the services for which payment is claimed were medically necessary, and that no qualified resident was available to perform the services. I further understand that these services are subject to post-payment review by the Medicare carrier.      Chrissie Soto MD    10/19/2024  12:33 PM

## 2024-10-19 NOTE — PROGRESS NOTES
Progress Note  Gynecology    Admit Date: 10/18/2024  LOS: 0    Reason for Admission:  Status post hysterectomy    SUBJECTIVE:     Wanda Singleton is a 44 y.o.  who is POD#1 s/p TLH/BS for the treatment of AUB. Surgery complicated by findings of thick, purulent complex fluid in cul-de-sac c/w prior pelvic infection/TOA. Patient admitted for IV abx.     Pt doing well this morning. Pain is well controlled. She is tolerating a regular diet without N/V. Ambulating without difficulty. Voiding via edmondson catheter after failing active void trial last night. Passing flatus. She is not yet having bowel movements.    OBJECTIVE:     Vital Signs   Temp:  [97.3 °F (36.3 °C)-99.3 °F (37.4 °C)] 99.1 °F (37.3 °C)  Pulse:  [59-97] 62  Resp:  [12-19] 12  SpO2:  [97 %-100 %] 98 %  BP: (104-133)/(58-82) 115/60      Intake/Output Summary (Last 24 hours) at 10/19/2024 0645  Last data filed at 10/19/2024 0620  Gross per 24 hour   Intake 2382.15 ml   Output 1850 ml   Net 532.15 ml       Physical Exam:  Gen: A&Ox3, NAD  CV: Regular rate  Pulm: Normal respiratory effort  Abd: soft, non-distended, non-tender to palpation without rebound or guarding  Inc: 3 trocar incisions CLD with dermabond in place  Ext: No peripheral edema, SCDs in place   : Edmondson in place draining clear yellow urine     Laboratory:  Lab Results   Component Value Date    WBC 15.23 (H) 10/19/2024    HGB 8.9 (L) 10/19/2024    HCT 28.1 (L) 10/19/2024    MCV 91 10/19/2024     10/19/2024         BMP  Lab Results   Component Value Date     10/18/2024    K 4.4 10/18/2024     10/18/2024    CO2 23 10/18/2024    BUN 10 10/18/2024    CREATININE 0.7 10/18/2024    CALCIUM 9.4 10/18/2024    ANIONGAP 8 10/18/2024    EGFRNORACEVR >60 10/18/2024     Lab Results   Component Value Date    ALT 58 (H) 2024    AST 68 (H) 2024    ALKPHOS 83 2024    BILITOT 0.7 2024         ASSESSMENT/PLAN:     Active Hospital Problems    Diagnosis  POA     *S/P [facility]/ [Z90.710]  No      Resolved Hospital Problems   No resolved problems to display.       Assessment: 44 y.o.  POD#1 from TL/BS.  Surgery complicated by findings of thick, purulent complex fluid in cul-de-sac c/w prior pelvic infection/TOA. Patient admitted for IV abx.     Plan:   1. Post-op   - Routine post-op advances  - Continue PRN pain medications.   - D/C edmondson and perform active voiding trial. UOP adequate.   - Bowel function: +flatus/-BM  - Encourage ambulation   - Encourage IS  - H/H as above. EBL 50cc  - Antiemetics prn nausea/vomiting.  - SCDS for DVT prophylaxis    2. TOA   - Afebrile overnight   - Pain well controlled  - Continue IV abx until clinical improvement    Dispo: As patient meets appropriate post-op milestones, plan for discharge as appropriate.     Kari Kohler MD  Obstetrics and Gynecology

## 2024-10-19 NOTE — PLAN OF CARE
Problem: Adult Inpatient Plan of Care  Goal: Plan of Care Review  Outcome: Met  Goal: Patient-Specific Goal (Individualized)  Outcome: Met  Goal: Absence of Hospital-Acquired Illness or Injury  Outcome: Met  Goal: Optimal Comfort and Wellbeing  Outcome: Met  Goal: Readiness for Transition of Care  Outcome: Met     Problem: Wound  Goal: Optimal Coping  Outcome: Met  Goal: Optimal Functional Ability  Outcome: Met  Goal: Absence of Infection Signs and Symptoms  Outcome: Met  Goal: Improved Oral Intake  Outcome: Met  Goal: Optimal Pain Control and Function  Outcome: Met  Goal: Skin Health and Integrity  Outcome: Met  Goal: Optimal Wound Healing  Outcome: Met     Problem: Infection  Goal: Absence of Infection Signs and Symptoms  Outcome: Met     Adequate for Discharge

## 2024-10-19 NOTE — PLAN OF CARE
"  Problem: Adult Inpatient Plan of Care  Goal: Plan of Care Review  Outcome: Progressing  Goal: Patient-Specific Goal (Individualized)  Outcome: Progressing  Goal: Absence of Hospital-Acquired Illness or Injury  Outcome: Progressing  Goal: Optimal Comfort and Wellbeing  Outcome: Progressing  Goal: Readiness for Transition of Care  Outcome: Progressing     Problem: Wound  Goal: Optimal Coping  Outcome: Progressing  Goal: Optimal Functional Ability  Outcome: Progressing  Goal: Absence of Infection Signs and Symptoms  Outcome: Progressing  Goal: Improved Oral Intake  Outcome: Progressing  Goal: Optimal Pain Control and Function  Outcome: Progressing  Goal: Skin Health and Integrity  Outcome: Progressing  Goal: Optimal Wound Healing  Outcome: Progressing     Problem: Infection  Goal: Absence of Infection Signs and Symptoms  Outcome: Progressing       POC reviewed with patient. Patient verbalized understanding. AAOX4. VSS. Pain managed per prn medication orders. Patient refused LR at 40 ml/hr. Pt stated, "IV fluids cause swelling and water weight with me. I will drink enough water." Call bell left within reach. Bed lowered and wheels locked. Patient free of falls and injury  "

## 2024-10-20 VITALS
BODY MASS INDEX: 24.96 KG/M2 | RESPIRATION RATE: 16 BRPM | TEMPERATURE: 100 F | WEIGHT: 140.88 LBS | HEIGHT: 63 IN | DIASTOLIC BLOOD PRESSURE: 60 MMHG | SYSTOLIC BLOOD PRESSURE: 117 MMHG | OXYGEN SATURATION: 100 % | HEART RATE: 81 BPM

## 2024-10-23 LAB
FINAL PATHOLOGIC DIAGNOSIS: NORMAL
GROSS: NORMAL
Lab: NORMAL

## 2024-11-01 ENCOUNTER — OFFICE VISIT (OUTPATIENT)
Dept: OBSTETRICS AND GYNECOLOGY | Facility: CLINIC | Age: 44
End: 2024-11-01
Payer: COMMERCIAL

## 2024-11-01 VITALS
DIASTOLIC BLOOD PRESSURE: 76 MMHG | BODY MASS INDEX: 22.18 KG/M2 | SYSTOLIC BLOOD PRESSURE: 120 MMHG | WEIGHT: 125.25 LBS

## 2024-11-01 DIAGNOSIS — B37.31 YEAST VAGINITIS: ICD-10-CM

## 2024-11-01 DIAGNOSIS — Z48.89 POSTOPERATIVE VISIT: Primary | ICD-10-CM

## 2024-11-01 DIAGNOSIS — Z90.710 STATUS POST HYSTERECTOMY: ICD-10-CM

## 2024-11-01 PROBLEM — N90.5 VULVAR ATROPHY: Status: RESOLVED | Noted: 2024-05-10 | Resolved: 2024-11-01

## 2024-11-01 PROBLEM — Z01.818 PREOPERATIVE EXAMINATION: Status: RESOLVED | Noted: 2024-10-04 | Resolved: 2024-11-01

## 2024-11-01 PROBLEM — N93.9 ABNORMAL UTERINE BLEEDING (AUB): Status: RESOLVED | Noted: 2024-09-17 | Resolved: 2024-11-01

## 2024-11-01 PROBLEM — N94.89 ENDOMETRIAL MASS: Status: RESOLVED | Noted: 2024-09-17 | Resolved: 2024-11-01

## 2024-11-01 PROBLEM — N95.1 PERIMENOPAUSE: Status: RESOLVED | Noted: 2024-06-04 | Resolved: 2024-11-01

## 2024-11-01 PROCEDURE — 99999 PR PBB SHADOW E&M-EST. PATIENT-LVL III: CPT | Mod: PBBFAC,,, | Performed by: OBSTETRICS & GYNECOLOGY

## 2024-11-01 RX ORDER — FLUCONAZOLE 150 MG/1
150 TABLET ORAL DAILY
Qty: 1 TABLET | Refills: 0 | Status: SHIPPED | OUTPATIENT
Start: 2024-11-01 | End: 2024-11-02

## 2024-12-02 ENCOUNTER — OFFICE VISIT (OUTPATIENT)
Dept: OBSTETRICS AND GYNECOLOGY | Facility: CLINIC | Age: 44
End: 2024-12-02
Payer: COMMERCIAL

## 2024-12-02 VITALS
WEIGHT: 127.44 LBS | DIASTOLIC BLOOD PRESSURE: 86 MMHG | SYSTOLIC BLOOD PRESSURE: 124 MMHG | BODY MASS INDEX: 22.57 KG/M2

## 2024-12-02 DIAGNOSIS — Z48.89 POSTOPERATIVE VISIT: Primary | ICD-10-CM

## 2024-12-02 DIAGNOSIS — Z90.710 STATUS POST HYSTERECTOMY: ICD-10-CM

## 2024-12-02 PROBLEM — Z98.890 STATUS POST HYSTEROSCOPY: Status: RESOLVED | Noted: 2024-09-20 | Resolved: 2024-12-02

## 2024-12-02 PROCEDURE — 3074F SYST BP LT 130 MM HG: CPT | Mod: CPTII,S$GLB,, | Performed by: OBSTETRICS & GYNECOLOGY

## 2024-12-02 PROCEDURE — 1159F MED LIST DOCD IN RCRD: CPT | Mod: CPTII,S$GLB,, | Performed by: OBSTETRICS & GYNECOLOGY

## 2024-12-02 PROCEDURE — 99999 PR PBB SHADOW E&M-EST. PATIENT-LVL III: CPT | Mod: PBBFAC,,, | Performed by: OBSTETRICS & GYNECOLOGY

## 2024-12-02 PROCEDURE — 1160F RVW MEDS BY RX/DR IN RCRD: CPT | Mod: CPTII,S$GLB,, | Performed by: OBSTETRICS & GYNECOLOGY

## 2024-12-02 PROCEDURE — 3079F DIAST BP 80-89 MM HG: CPT | Mod: CPTII,S$GLB,, | Performed by: OBSTETRICS & GYNECOLOGY

## 2024-12-02 PROCEDURE — 99024 POSTOP FOLLOW-UP VISIT: CPT | Mod: S$GLB,,, | Performed by: OBSTETRICS & GYNECOLOGY

## 2024-12-02 PROCEDURE — 3044F HG A1C LEVEL LT 7.0%: CPT | Mod: CPTII,S$GLB,, | Performed by: OBSTETRICS & GYNECOLOGY

## 2024-12-02 NOTE — PROGRESS NOTES
Chief Complaint   Patient presents with    Post-op Evaluation       HPI:   44 y.o.  here today for 6 week postop visit. She is s/p TLH/BS on 10/18/24. She is overall doing well, pelvic pain much improved. She continues to report a small amount of vaginal discharge, denies vaginal bleeding. Eating normally, having regular bowel movements, urinating without difficulty. Does note breast tenderness and weight gain, some increased emotionality.      Intra-op findings: Normal external female genitalia. 8-10 week size uterus on exam with some mobility, limited descent with tenaculum. Normal liver, gallbladder, and stomach. Adhesions of the omentum to the anterior abdominal wall and uterine fundus. Adhesions of the large intestine to the abdominal sidewalls. Normal appendix. Dilated, abnormal fallopian tubes bilaterally with dense adhesions to the ovaries and to the pelvic sidewalls. Adhesions of large intestine to left adnexa and posterior uterus. Upon dissecting the cul-de-sac, thick, purulent complex fluid encountered c/w prior pelvic infection/TOA. Left ovary with 2-3 cm endometrioma/chocolate cyst, drained during removal of left fallopian tube. ROV adhered to pelvic sidewall with simple cyst. Ureters visualized in their normal anatomic positions at the pelvic brim at the start and the conclusion of the case. Cuff closed with Endostitch.      Path: Uterus and cervix weighing 130 g, secretory endometrium with focal adenomyosis, benign cervix, two intramural leiomyomas measuring up to 1.2 cm. Right and left fallopian tubes are dilated and show chronic inflammation with fibrosis and reactive changes     No history of cervical dysplasia. No further paps needed.    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     Vulvar atrophy  05/10/2024     Past Surgical History:   Procedure Laterality Date    ETHMOIDECTOMY  7/21/2022    Procedure: ETHMOIDECTOMY;  Surgeon: Delvis Galloway MD;  [facility];  Service: ENT;;    FRONTAL SINUS OBLITERATION  7/21/2022    Procedure: SINUSOTOMY, FRONTAL SINUS, OBLITERATIVE;  Surgeon: Delvis Galloway MD;  [facility];  Service: ENT;;    FUNCTIONAL ENDOSCOPIC SINUS SURGERY (FESS) USING COMPUTER-ASSISTED NAVIGATION Bilateral 7/21/2022    Procedure: FESS, USING COMPUTER-ASSISTED NAVIGATION;  Surgeon: Delvis Galloway MD;  [facility];  Service: ENT;  Laterality: Bilateral;    HYSTERECTOMY, TOTAL, LAPAROSCOPIC, WITH SALPINGECTOMY N/A 10/18/2024    Procedure: HYSTERECTOMY,TOTAL,LAPAROSCOPIC,WITH SALPINGECTOMY;  Surgeon: Yenny Dang MD;  Location: [facility];  Service: OB/GYN;  Laterality: N/A;    HYSTEROSCOPY WITH DILATION AND CURETTAGE OF UTERUS N/A 9/20/2024    Procedure: HYSTEROSCOPY, WITH DILATION AND CURETTAGE OF UTERUS;  Surgeon: Yenny Dang MD;  Location: [facility];  Service: OB/GYN;  Laterality: N/A;    LAPAROSCOPY      x2 for endometriosis    LEG SURGERY      x8    MAXILLARY ANTROSTOMY  7/21/2022    Procedure: MAXILLARY ANTROSTOMY;  Surgeon: Delvis Galloway MD;  [facility];  Service: ENT;;       Current Outpatient Medications:     albuterol (PROVENTIL/VENTOLIN HFA) 90 mcg/actuation inhaler, Inhale 2 puffs into the lungs every 6 (six) hours as needed., Disp: , Rfl:     ascorbic acid, vitamin C, (VITAMIN C) 500 MG tablet, Take 500 mg by mouth once daily., Disp: , Rfl:     diphenhydrAMINE (BENADRYL) 25 mg capsule, Take 1 capsule by mouth every evening., Disp: , Rfl:     ibuprofen (ADVIL,MOTRIN) 600 MG tablet, Take 1 tablet (600 mg total) by mouth 3 (three) times daily., Disp: 60 tablet, Rfl: 0    inulin (FIBER GUMMIES ORAL), Take by mouth., Disp: , Rfl:     lysine (L-LYSINE) 500 mg Tab, Take 500 mg by mouth once daily., Disp: , Rfl:     melatonin  5 mg Chew, Take 1 tablet by mouth every evening., Disp: , Rfl:     multivitamin (THERAGRAN) per tablet, Take 1 tablet by mouth once daily., Disp: , Rfl:     ondansetron (ZOFRAN-ODT) 8 MG TbDL, Take 1 tablet (8 mg total) by mouth every 8 (eight) hours as needed (nausea)., Disp: 30 tablet, Rfl: 1    UNABLE TO FIND, Cbd oil, Disp: , Rfl:     HYDROcodone-acetaminophen (NORCO) 5-325 mg per tablet, Take 1 tablet by mouth every 6 (six) hours as needed for Pain. (Patient not taking: Reported on 2024), Disp: 20 tablet, Rfl: 0  No current facility-administered medications for this visit.    Facility-Administered Medications Ordered in Other Visits:     haloperidol lactate injection 0.5 mg, 0.5 mg, Intravenous, Q10 Min PRN, Popeye Phelps MD  Review of patient's allergies indicates:   Allergen Reactions    Bee's [allergen ext-venom-honey bee] Anaphylaxis    Fluticasone Anxiety, Other (See Comments), Palpitations and Shortness Of Breath    Guaifenesin Anxiety, Hallucinations, Palpitations and Shortness Of Breath    Horseradish root extract Anaphylaxis, Hives, Nausea And Vomiting, Palpitations, Shortness Of Breath and Swelling    Adhesive Hives, Itching and Rash    Fluorouracil-adhesive bandage Rash    Oxycodone Hives and Itching     Tolerates Dilaudid, morphine, hydrocodone.   Metabolizes pain meds quickly (redhead) does better with lower dose meds that can be taken more frequently.     Bleach (sodium hypochlorite)     Levofloxacin     Latex, natural rubber Hives, Itching and Rash    Penicillins Hives and Nausea And Vomiting    Sulfa (sulfonamide antibiotics) Anxiety, Diarrhea, Hives, Itching, Nausea And Vomiting and Palpitations     OB History    Para Term  AB Living   0 0 0         SAB IAB Ectopic Multiple Live Births                 Social History     Tobacco Use    Smoking status: Every Day     Current packs/day: 0.50     Types: Cigarettes    Smokeless tobacco: Never   Substance Use Topics    Alcohol use:  Not Currently    Drug use: Not Currently     Types: Hydrocodone     Family History   Problem Relation Name Age of Onset    Breast cancer Neg Hx      Colon cancer Neg Hx      Ovarian cancer Neg Hx         Review of Systems   Negative except as in HPI    Physical Exam   Vitals:    12/02/24 1438   BP: 124/86     Body mass index is 22.57 kg/m².    Physical Exam  Constitutional:       General: She is not in acute distress.     Appearance: Normal appearance.     Genitourinary:    Vulva, right adnexa, left adnexa and urethral meatus normal.   The external female genitalia was normal.   No external genitalia lesions identified,Genitalia hair distrobution normal .   Vaginal cuff intact.  No vaginal discharge or bleeding in the vagina.    No vaginal prolapse present.     No vaginal atrophy present.  Right adnexum displays no mass, no tenderness and no fullness. Left adnexum displays no mass, no tenderness and no fullness. Cervix is absent.Uterus is absent. HENT:      Head: Normocephalic and atraumatic.   Eyes:      Extraocular Movements: Extraocular movements intact.      Pupils: Pupils are equal, round, and reactive to light.   Pulmonary:      Effort: Pulmonary effort is normal.   Abdominal:      General: Abdomen is flat. There is no distension.      Palpations: Abdomen is soft.      Tenderness: There is no abdominal tenderness. There is no guarding or rebound.      Comments: LSC incisions well-healed   Musculoskeletal:         General: Normal range of motion.      Cervical back: Normal range of motion.   Neurological:      General: No focal deficit present.      Mental Status: She is alert and oriented to person, place, and time.   Skin:     General: Skin is warm and dry.   Psychiatric:         Mood and Affect: Mood normal.         Behavior: Behavior normal.         Thought Content: Thought content normal.   Vitals reviewed.        ASSESSMENT:   Patient Active Problem List   Diagnosis    Pituitary cyst    Headache    Visual  changes    Intractable migraine without aura and with status migrainosus    Sinus pressure    Chronic sinusitis    Endometriosis    Anosmia    Fatigue    TOA (tubo-ovarian abscess)    S/P TLH/BS    Postoperative visit    Yeast vaginitis       PLAN:  Problem List Items Addressed This Visit          Renal/    S/P TLH/BS       Other    Postoperative visit - Primary    Current Assessment & Plan     Doing well overall postop. Cuff intact visibly and to palpation. Wait 2 more weeks before resuming intercourse. Recommend regular use of lubricant. Path benign, future paps not needed.                Follow up if symptoms worsen or fail to improve.       Yenny Dang MD  Department of Obstetrics & Gynecology  [facility]

## 2024-12-02 NOTE — ASSESSMENT & PLAN NOTE
Doing well overall postop. Cuff intact visibly and to palpation. Wait 2 more weeks before resuming intercourse. Recommend regular use of lubricant. Path benign, future paps not needed.

## 2025-01-06 ENCOUNTER — PATIENT MESSAGE (OUTPATIENT)
Dept: OBSTETRICS AND GYNECOLOGY | Facility: CLINIC | Age: 45
End: 2025-01-06
Payer: COMMERCIAL

## 2025-01-06 DIAGNOSIS — N89.8 VAGINAL ITCHING: Primary | ICD-10-CM

## 2025-01-06 RX ORDER — FLUCONAZOLE 150 MG/1
150 TABLET ORAL
Qty: 2 TABLET | Refills: 0 | Status: SHIPPED | OUTPATIENT
Start: 2025-01-06

## 2025-01-26 ENCOUNTER — PATIENT MESSAGE (OUTPATIENT)
Dept: OBSTETRICS AND GYNECOLOGY | Facility: CLINIC | Age: 45
End: 2025-01-26
Payer: COMMERCIAL

## 2025-07-30 ENCOUNTER — PATIENT MESSAGE (OUTPATIENT)
Dept: OBSTETRICS AND GYNECOLOGY | Facility: CLINIC | Age: 45
End: 2025-07-30
Payer: COMMERCIAL

## 2025-08-18 DIAGNOSIS — M48.02 SPINAL STENOSIS IN CERVICAL REGION: ICD-10-CM

## 2025-08-18 DIAGNOSIS — M25.511 PAIN IN RIGHT SHOULDER: ICD-10-CM

## 2025-08-18 DIAGNOSIS — M50.322 OTHER CERVICAL DISC DEGENERATION AT C5-C6 LEVEL: ICD-10-CM

## 2025-08-18 DIAGNOSIS — M47.12 OTHER SPONDYLOSIS WITH MYELOPATHY, CERVICAL REGION: Primary | ICD-10-CM

## 2025-08-25 ENCOUNTER — HOSPITAL ENCOUNTER (OUTPATIENT)
Dept: RADIOLOGY | Facility: HOSPITAL | Age: 45
Discharge: HOME | End: 2025-08-25
Attending: SPECIALIST
Payer: COMMERCIAL

## 2025-08-25 DIAGNOSIS — M25.511 PAIN IN RIGHT SHOULDER: ICD-10-CM

## 2025-08-25 DIAGNOSIS — M48.02 SPINAL STENOSIS IN CERVICAL REGION: ICD-10-CM

## 2025-08-25 DIAGNOSIS — M50.322 OTHER CERVICAL DISC DEGENERATION AT C5-C6 LEVEL: ICD-10-CM

## 2025-08-25 DIAGNOSIS — M47.12 OTHER SPONDYLOSIS WITH MYELOPATHY, CERVICAL REGION: ICD-10-CM

## 2025-08-25 PROCEDURE — 72141 MRI NECK SPINE W/O DYE: CPT | Mod: TC

## 2025-08-25 PROCEDURE — 73221 MRI JOINT UPR EXTREM W/O DYE: CPT | Mod: 26,RT,, | Performed by: RADIOLOGY

## 2025-08-25 PROCEDURE — 73221 MRI JOINT UPR EXTREM W/O DYE: CPT | Mod: TC,RT

## 2025-08-25 PROCEDURE — 72141 MRI NECK SPINE W/O DYE: CPT | Mod: 26,,, | Performed by: RADIOLOGY

## 2025-09-02 ENCOUNTER — PATIENT MESSAGE (OUTPATIENT)
Dept: OBSTETRICS AND GYNECOLOGY | Facility: CLINIC | Age: 45
End: 2025-09-02
Payer: COMMERCIAL

## (undated) DEVICE — SET TRI-LUMEN FILTERED TUBE

## (undated) DEVICE — JELLY SURGILUBE 5GR

## (undated) DEVICE — GRASPER EPIX LAPSCP 5MM 35CM

## (undated) DEVICE — SOL STRL WATER INJ 1000ML BG

## (undated) DEVICE — SUT EASE CROSSBOW CLSR SYS

## (undated) DEVICE — DRAPE EENT SPLIT STERILE

## (undated) DEVICE — SOL POVIDONE PREP IODINE 4 OZ

## (undated) DEVICE — SUT ETHILON 4-0 PS2 18 BLK

## (undated) DEVICE — ENDOSTITCH POLYSORB 0 ES-9

## (undated) DEVICE — GLOVE SENSICARE PI GRN 6.5

## (undated) DEVICE — TRAY ENT 4/CS

## (undated) DEVICE — SUT VICRYL+ 27 UR-6 VIOL

## (undated) DEVICE — SOL NORMAL USPCA 0.9%

## (undated) DEVICE — SOL POVIDONE SCRUB IODINE 4 OZ

## (undated) DEVICE — SYR 50CC LL

## (undated) DEVICE — SOL IRR SOD CHL .9% POUR

## (undated) DEVICE — Device

## (undated) DEVICE — CATH IV INTROCAN 14G X 2.

## (undated) DEVICE — ELECTRODE REM PLYHSV RETURN 9

## (undated) DEVICE — DEVICE MYOSURE REACH

## (undated) DEVICE — SEE MEDLINE ITEM 157144

## (undated) DEVICE — CONTAINER SPECIMEN STRL 4OZ

## (undated) DEVICE — PACK LAPAROSCOPY BAPTIST

## (undated) DEVICE — NDL HYPO REG 25G X 1 1/2

## (undated) DEVICE — SHEATH CLN ENDOSCRB 4MM

## (undated) DEVICE — SEE MEDLINE ITEM 156913

## (undated) DEVICE — DRAPE CORETEMP FLD WRM 56X62IN

## (undated) DEVICE — TOWEL OR DISP STRL BLUE 4/PK

## (undated) DEVICE — SCISSOR 5MMX35CM DIRECT DRIVE

## (undated) DEVICE — SEAL LENS SCOPE MYOSURE

## (undated) DEVICE — SUT MCRYL PLUS 4-0 PS2 27IN

## (undated) DEVICE — IRRIGATOR ENDOSCOPY DISP.

## (undated) DEVICE — BLADE QUADCUT STRAIGHT 4.3MM

## (undated) DEVICE — TRACKER ENT INSTRUMENT

## (undated) DEVICE — GLOVE SENSICARE PI SURG 6

## (undated) DEVICE — SYR 10CC LUER LOCK

## (undated) DEVICE — TIP RUMI KOH-EFFICIENT 3.0

## (undated) DEVICE — SYS SEE SHARP SCP ANTIFG LNG

## (undated) DEVICE — SOL NACL IRR 3000ML

## (undated) DEVICE — PORT ACCESS 5MM W/120MM

## (undated) DEVICE — TROCAR KII FIOS 11MM X 100MM

## (undated) DEVICE — NDL INSUFFLATION VERRES 120MM

## (undated) DEVICE — GOWN FAB REINF SET-IN SLV 2XL

## (undated) DEVICE — TRACKER PATIENT NON INVASIVE

## (undated) DEVICE — SUT 4-0 CHROMIC GUT / SH

## (undated) DEVICE — KIT WING PAD POSITIONING

## (undated) DEVICE — TIP RUMI BLUE DISPOSABLE 5/BX

## (undated) DEVICE — SHEATH 197230BVA  4MM 30D

## (undated) DEVICE — DRAPE STERI LONG

## (undated) DEVICE — SPONGE PATTY SURGICAL .5X3IN

## (undated) DEVICE — SPLINT REUTER BIVALVE 0.5MM

## (undated) DEVICE — SET IRR CYSTO 4 LEAD 90IN

## (undated) DEVICE — ENDOSTITCH INSTRUMENT

## (undated) DEVICE — GOWN NONREINF SET-IN SLV XL

## (undated) DEVICE — SEALER LIGASURE LAP 37CM 5MM

## (undated) DEVICE — SPONGE LAP 18X18 PREWASHED

## (undated) DEVICE — PACK FLUENT DISPOSABLE

## (undated) DEVICE — TROCAR KII FIOS 5MM X 100MM

## (undated) DEVICE — DRAPE LAPSCP CHOLE 122X102X78